# Patient Record
Sex: FEMALE | Race: WHITE | NOT HISPANIC OR LATINO | Employment: OTHER | URBAN - METROPOLITAN AREA
[De-identification: names, ages, dates, MRNs, and addresses within clinical notes are randomized per-mention and may not be internally consistent; named-entity substitution may affect disease eponyms.]

---

## 2017-04-14 ENCOUNTER — TRANSFERRED RECORDS (OUTPATIENT)
Dept: HEALTH INFORMATION MANAGEMENT | Facility: CLINIC | Age: 77
End: 2017-04-14

## 2017-04-27 ENCOUNTER — TRANSFERRED RECORDS (OUTPATIENT)
Dept: HEALTH INFORMATION MANAGEMENT | Facility: CLINIC | Age: 77
End: 2017-04-27

## 2017-05-05 ENCOUNTER — MYC MEDICAL ADVICE (OUTPATIENT)
Dept: FAMILY MEDICINE | Facility: CLINIC | Age: 77
End: 2017-05-05

## 2017-05-08 NOTE — TELEPHONE ENCOUNTER
Luciano Fonseca MD sent [employer] message, will close encounter for now  Katina Li RN, BSN  Message handled by Nurse Triage.

## 2017-05-10 ENCOUNTER — MYC MEDICAL ADVICE (OUTPATIENT)
Dept: FAMILY MEDICINE | Facility: CLINIC | Age: 77
End: 2017-05-10

## 2017-05-11 NOTE — TELEPHONE ENCOUNTER
Luciano Fonseca MD, see [employer] message, pt in [place], has neck issue-wonders if you want to refer elsewhere for issue or see you? Inform pt of plan via [facility]ive[facility] Li RN, BSN  Message handled by Nurse Triage.

## 2017-05-12 NOTE — TELEPHONE ENCOUNTER
i think we answered this in the last [employer] message. I'd like to see her and size it up to see what kind of specialty care might be the ticket.   BP Readings from Last 5 Encounters:   08/23/16 122/72   08/01/16 136/74   07/28/16 140/82   07/08/15 126/86   11/01/14 130/74   .

## 2017-05-22 ENCOUNTER — TRANSFERRED RECORDS (OUTPATIENT)
Dept: HEALTH INFORMATION MANAGEMENT | Facility: CLINIC | Age: 77
End: 2017-05-22

## 2017-05-23 ENCOUNTER — OFFICE VISIT (OUTPATIENT)
Dept: FAMILY MEDICINE | Facility: CLINIC | Age: 77
End: 2017-05-23
Payer: MEDICARE

## 2017-05-23 VITALS
HEART RATE: 90 BPM | BODY MASS INDEX: 32.5 KG/M2 | SYSTOLIC BLOOD PRESSURE: 150 MMHG | HEIGHT: 63 IN | OXYGEN SATURATION: 96 % | DIASTOLIC BLOOD PRESSURE: 80 MMHG | WEIGHT: 183.4 LBS | TEMPERATURE: 97.6 F

## 2017-05-23 DIAGNOSIS — F33.1 MAJOR DEPRESSIVE DISORDER, RECURRENT EPISODE, MODERATE (H): ICD-10-CM

## 2017-05-23 DIAGNOSIS — M50.30 DEGENERATION OF CERVICAL INTERVERTEBRAL DISC: Primary | ICD-10-CM

## 2017-05-23 PROCEDURE — 99214 OFFICE O/P EST MOD 30 MIN: CPT | Performed by: FAMILY MEDICINE

## 2017-05-23 NOTE — PROGRESS NOTES
SUBJECTIVE:                                                    Louann Crocker is a 76 year old female who presents to clinic today for the following health issues:  Past Medical History:   Diagnosis Date     Anxiety      DDD (degenerative disc disease), cervical      DDD (degenerative disc disease), lumbar      Fibromyalgia      GERD (gastroesophageal reflux disease)      IBS (irritable bowel syndrome)      Major depressive disorder, single episode, severe, without mention of psychotic behavior 2005    ECT x 16       Past Surgical History:   Procedure Laterality Date     ARTHROSCOPY KNEE RT/LT  2007     BACK SURGERY  5/2013    Sacroileac Fusion     BACK SURGERY  10/6/2014    Lumbar Fusion L3-L-4, L-4L-5     C FUSION MC-P JOINT,SINGLE  2010     C FUSION SHOULDER JOINT  1/01    lap bone sput     C ULLOA W/O FACETEC FORAMOT/DSKC 1/2 VRT SEG, LUMBAR  1993     C LAP,BILIARY TRACT,UNLISTED  1966     C LAPAROSCOPY, SURGICAL; W/ VAGINAL HYSTERECTOMY W/WO REMOVAL OVARY(S)/TUBES  1997    Laparoscopy, Vag Hysterectomy     C REMOVAL GALLBLADDER  1964    Cholecystectomy     CATARACT IOL, RT/LT  2008     COLONOSCOPY       ENDOSCOPIC RETROGRADE CHOLANGIOPANCREATOGRAPHY  2005     HC DILATION/CURETTAGE DIAG/THER NON OB  1961    D & C     HC ERCP W SPHINCTEROTOMY  12/09    CBD stones     HC REPAIR ROTATOR CUFF,ACUTE  3/90     SURGICAL HISTORY OF -   2005    cervical     SURGICAL HISTORY OF -   2005    lumbar      SURGICAL HISTORY OF -   2008    right knee replacement     VITRECTOMY PARSPLANA WITH 25 GAUGE SYSTEM  5/10/2012    Procedure:VITRECTOMY PARSPLANA WITH 25 GAUGE SYSTEM; LEFT EYE 25 GAUGE VITRECTOMY, MEMBRANE STRIPPING, AIR  FLUID EXCHANGE, GAS 15% C3F8; Surgeon:ANTONIETTA WALTERS; Location:[facility]       Family History   Problem Relation Age of Onset     Arthritis Mother      fibromyalgia     OSTEOPOROSIS Mother      CANCER Mother      colon     Hypertension Father      CEREBROVASCULAR DISEASE Father      Respiratory Brother       emphysema       Social History   Substance Use Topics     Smoking status: Former Smoker     Packs/day: 1.00     Years: 20.00     Types: Cigarettes     Quit date: 1/1/1977     Smokeless tobacco: Never Used     Alcohol use Yes      Comment: on occasion         Joint Pain     Onset: 2 months     Description:   Location: Neck, back left arm is getting weak   Character: Sharp,     Intensity: moderate    Progression of Symptoms: same    Accompanying Signs & Symptoms:  Other symptoms: radiation of pain to neck to back and down the arm    History:   Previous similar pain: yes       Precipitating factors:   Trauma or overuse: YES, overuse     Alleviating factors:  Improved by: nothing       Therapies Tried and outcome: none   Had MRI showing old fusion and ? New disk issues, She has the     Pain in both hands with known cervical spondylosis. Has had prior one level fusion now pain in right hand initially in the the thumb then as that improved pain in left forearm and dorsum of hand  Seen and MRI in [place]: patient has the study with her to go to Pain management to consider injection   She also had CST treatment in [place] for her depression and feels that she has been helped a lot by that.      REVIEW OF SYSTEMS    Generally has been otherwise  feeling well until this episode. No problems with vision, hearing, dental or neck pain.Has hph airborne or ingestion allergy  No chest pain, palpitations, dyspnea, change in bowel habits, blood  in stool or dyspepsia.  No rashes, changing moles, weakness, lassitude or low  back problems.  No chronic issues . No dysuria  Patient  Not  a smoker. No problems with significant headaches.  On exam the vital signs are stable  Weight is Body mass index is 32.49 kg/(m^2).   Eyes show mercy   No neck masses or thyromegaly.Ear nose and throat shows normal   No bruits, murmers, rubs or extrasounds. No cardiomegaly or chest wall tenderness. Lungs clear, no abdominal masses or organomegaly. No  CVA tenderness.  Skin eval no rash . No abnormal skin lesions. Normal genitalia. Good peripheral pulses. No adenopathy. and stance. Neck is supple.      (M50.30) Degeneration of cervical intervertebral disc  (primary encounter diagnosis)  Comment:   Plan: ORTHO  REFERRAL            (F33.1) Major depressive disorder, recurrent episode, moderate (H)  Comment: much better after her [place] stimulation treatment   Plan: phq9

## 2017-05-23 NOTE — MR AVS SNAPSHOT
After Visit Summary   5/23/2017    Louann Crocker    MRN: [id number]           Patient Information     Date Of Birth          1940        Visit Information        Provider Department      5/23/2017 10:30 AM Luciano Fonseca MD [facility]        Today's Diagnoses     Degeneration of cervical intervertebral disc    -  1       Follow-ups after your visit        Additional Services     ORTHO  REFERRAL       [facility] is referring you to the Orthopedic  Services at [facility].       The  Representative will assist you in the coordination of your Orthopedic and Musculoskeletal Care as prescribed by your physician.    The  Representative will call you within 1 business day to help schedule your appointment, or you may contact the  Representative at:    All areas ~ [phone]     Type of Referral : Spine: Cervical / Thoracic: Cervical / Thoracic Spine Surgeon        Timeframe requested: Within 2 weeks    Coverage of these services is subject to the terms and limitations of your health insurance plan.  Please call member services at your health plan with any benefit or coverage questions.      If X-rays, CT or MRI's have been performed, please contact the facility where they were done to arrange for , prior to your scheduled appointment.  Please bring this referral request to your appointment and present it to your specialist.                  Who to contact     If you have questions or need follow up information about today's clinic visit or your schedule please contact [facility] directly at [phone].  Normal or non-critical lab and imaging results will be communicated to you by MyChart, letter or phone within 4 business days after the clinic has received the results. If you do not hear from us within 7 days, please contact the clinic through [employer] or  "phone. If you have a critical or abnormal lab result, we will notify you by phone as soon as possible.  Submit refill requests through LocBox or call your pharmacy and they will forward the refill request to us. Please allow 3 business days for your refill to be completed.          Additional Information About Your Visit        Callisionhart Information     [employer]Box gives you secure access to your electronic health record. If you see a primary care provider, you can also send messages to your care team and make appointments. If you have questions, please call your primary care clinic.  If you do not have a primary care provider, please call [phone] and they will assist you.        Care EveryWhere ID     This is your Care EveryWhere ID. This could be used by other organizations to access your [facility] medical records  [id number]        Your Vitals Were     Pulse Temperature Height Pulse Oximetry BMI (Body Mass Index)       90 97.6  F (36.4  C) (Oral) 5' 3\" (1.6 m) 96% 32.49 kg/m2        Blood Pressure from Last 3 Encounters:   05/23/17 150/80   08/23/16 122/72   08/01/16 136/74    Weight from Last 3 Encounters:   05/23/17 183 lb 6.4 oz (83.2 kg)   08/23/16 179 lb (81.2 kg)   08/01/16 179 lb (81.2 kg)              We Performed the Following     DEPRESSION ACTION PLAN (DAP)     ORTHO [facility] REFERRAL        Primary Care Provider Office Phone # Fax #    Luciano Dylan Fonseca [phone]       [address] [address]        Thank you!     Thank you for choosing [facility]  for your care. Our goal is always to provide you with excellent care. Hearing back from our patients is one way we can continue to improve our services. Please take a few minutes to complete the written survey that you may receive in the mail after your visit with us. Thank you!             Your Updated Medication List - Protect others around you: Learn how to safely " use, store and throw away your medicines at www.disposemymeds.org.          This list is accurate as of: 5/23/17 11:01 AM.  Always use your most recent med list.                   Brand Name Dispense Instructions for use    calcium carbonate-vitamin D 600-400 MG-UNIT Chew      Take 1 chew tab by mouth       cholecalciferol 5000 UNITS Caps capsule    vitamin D3     Take 5,000 Units by mouth daily       clonazePAM 0.5 MG tablet    klonoPIN     Take 0.5 tablets (0.25 mg) by mouth 4 times daily       DULOXETINE HCL PO      Take 120 mg by mouth daily Pt taking 90mg daily at bedtime. Pt takes a 60mg tablet and a 30mg tablet to equal the 90mg dose.       estradiol 0.1 MG/GM cream    ESTRACE VAGINAL    42.5 g    Place 2 g vaginally twice a week       estradiol 1 MG tablet    ESTRACE    90 tablet    Take 1 tablet (1 mg) by mouth daily       flax seed oil 1000 MG capsule      Take 1 capsule by mouth daily       lamoTRIgine 100 MG tablet    LaMICtal         lidocaine 2 % topical gel    XYLOCAINE    30 mL    Apply topically as needed for moderate pain Apply to right 3rd toe up to 3 times a day as needed for pain.       Multi-vitamin Tabs tablet   Generic drug:  multivitamin, therapeutic with minerals     30    1 TABLET DAILY       NAPROXEN PO      Take 220-440 mg by mouth 2 times daily as needed for moderate pain       OMEPRAZOLE PO      Take 20 mg by mouth every morning       PAROXETINE HCL PO      Take 20 mg by mouth At Bedtime       tolnaftate 1 % cream    TINACTIN    30 g    Apply topically 2 times daily       VITAMIN B6 PO      Take 100 mg by mouth daily

## 2017-05-24 ASSESSMENT — PATIENT HEALTH QUESTIONNAIRE - PHQ9: SUM OF ALL RESPONSES TO PHQ QUESTIONS 1-9: 0

## 2017-05-31 ENCOUNTER — OFFICE VISIT (OUTPATIENT)
Dept: NEUROSURGERY | Facility: CLINIC | Age: 77
End: 2017-05-31
Attending: NURSE PRACTITIONER
Payer: MEDICARE

## 2017-05-31 ENCOUNTER — TELEPHONE (OUTPATIENT)
Dept: PALLIATIVE MEDICINE | Facility: CLINIC | Age: 77
End: 2017-05-31

## 2017-05-31 VITALS
HEART RATE: 81 BPM | SYSTOLIC BLOOD PRESSURE: 169 MMHG | WEIGHT: 183 LBS | BODY MASS INDEX: 32.43 KG/M2 | HEIGHT: 63 IN | OXYGEN SATURATION: 97 % | DIASTOLIC BLOOD PRESSURE: 82 MMHG | TEMPERATURE: 97.2 F

## 2017-05-31 DIAGNOSIS — M54.12 CERVICAL RADICULAR PAIN: Primary | ICD-10-CM

## 2017-05-31 PROCEDURE — 99211 OFF/OP EST MAY X REQ PHY/QHP: CPT | Performed by: NURSE PRACTITIONER

## 2017-05-31 PROCEDURE — 99203 OFFICE O/P NEW LOW 30 MIN: CPT | Performed by: NURSE PRACTITIONER

## 2017-05-31 ASSESSMENT — PAIN SCALES - GENERAL: PAINLEVEL: MILD PAIN (3)

## 2017-05-31 NOTE — PATIENT INSTRUCTIONS
1.  Please schedule your injection. Someone will contact you from the pain clinic within 24 hours to schedule.      2.  Please schedule your physical therapy.      3.  Please contact the clinic if pain persists at [phone].

## 2017-05-31 NOTE — MR AVS SNAPSHOT
After Visit Summary   5/31/2017    Louann Crocker    MRN: [id number]           Patient Information     Date Of Birth          1940        Visit Information        Provider Department      5/31/2017 10:00 AM Flor Ovalle APRN CNP [facility]        Today's Diagnoses     Cervical radicular pain    -  1      Care Instructions    1.  Please schedule your injection. Someone will contact you from the pain clinic within 24 hours to schedule.      2.  Please schedule your physical therapy.      3.  Please contact the clinic if pain persists at [phone].           Follow-ups after your visit        Additional Services     [facility], AND CHIROPRACT[facility] REFERRAL       **This order will print in the [facility] Scheduling Office**    Physical Therapy, Hand Therapy and Chiropractic Care are available through:    *[facility]  *[facility]  *[facility]    Call one number to schedule at any of the above locations: [phone].    Your provider has referred you to: Physical Therapy at [facility] or [facility]    Indication/Reason for Referral: neck and arm pain   Onset of Illness: chronic  Therapy Orders: Evaluate and Treat  Special Programs: None  Special Request: None    Shalini Loya      Additional Comments for the Therapist or Chiropractor:         Please be aware that coverage of these services is subject to the terms and limitations of your health insurance plan.  Call member services at your health plan with any benefit or coverage questions.      Please bring the following to your appointment:    *Your personal calendar for scheduling future appointments  *Comfortable clothing            PAIN MANAGEMENT CENTER ([place]) REFERRAL       Your provider has referred you to the [facility]. Dr. Baker    Reason for Referral: Procedure or injection only - patient will be contacted within 24 hrs to schedule: Epidural  Steroid (interlaminar approach): Cervical    Please complete the following questions:    What is your diagnosis for the patient's pain? Cervical radicular pain     Do you have any specific questions for the pain specialist? No    Are there any red flags that may impact the assessment or management of the patient? None    **ANY DIAGNOSTIC TESTS THAT ARE NOT IN EPIC SHOULD BE SENT TO THE PAIN CENTER**    Please note the Pre-Op Pain Consult must be scheduled 2-3 weeks prior to the patient's surgery.  Patient's trying to schedule within 2 weeks of surgery may not be accommodated.     Pre-Op Pain Consults are only good for 30 days.    REGARDING OPIOID MEDICATIONS:  We will always address appropriateness of opioid pain medications, but we generally will not automatically take on a prescribing role. When we do take on prescribing of opioids for chronic pain, it is in collaboration with the referring physician for an intermediate period of time (months), with an expectation that the primary physician or provider will assume the prescribing role if medications are effective at stable doses with demonstrated compliance.  Therefore, please do not assume that your prescribing responsibilities end on the day of pain clinic consultation.  Is this agreeable to you? YES    For any questions, contact the [facility] at [phone].    Please be aware that coverage of these services is subject to the terms and limitations of your health insurance plan.  Call member services at your health plan with any benefit or coverage questions.      Please bring the following with you to your appointment:    (1) Any X-Rays, CTs or MRIs which have been performed.  Contact the facility where they were done to arrange for  prior to your scheduled appointment.    (2) List of current medications   (3) This referral request   (4) Any documents/labs given to you for this referral                  Who to contact     If you  "have questions or need follow up information about today's clinic visit or your schedule please contact [facility] directly at [phone].  Normal or non-critical lab and imaging results will be communicated to you by MyChart, letter or phone within 4 business days after the clinic has received the results. If you do not hear from us within 7 days, please contact the clinic through MyChart or phone. If you have a critical or abnormal lab result, we will notify you by phone as soon as possible.  Submit refill requests through M[employer] ([email or web address]) or call your pharmacy and they will forward the refill request to us. Please allow 3 business days for your refill to be completed.          Additional Information About Your Visit        [employer] Information     [employer] ([email or web address]) gives you secure access to your electronic health record. If you see a primary care provider, you can also send messages to your care team and make appointments. If you have questions, please call your primary care clinic.  If you do not have a primary care provider, please call [phone] and they will assist you.        Care EveryWhere ID     This is your Care EveryWhere ID. This could be used by other organizations to access your [facility] medical records  [id number]        Your Vitals Were     Pulse Temperature Height Pulse Oximetry BMI (Body Mass Index)       81 97.2  F (36.2  C) 5' 3\" (1.6 m) 97% 32.42 kg/m2        Blood Pressure from Last 3 Encounters:   05/31/17 169/82   05/23/17 (P) 138/80   08/23/16 122/72    Weight from Last 3 Encounters:   05/31/17 183 lb (83 kg)   05/23/17 183 lb 6.4 oz (83.2 kg)   08/23/16 179 lb (81.2 kg)              We Performed the Following     [facility] REFERRAL     PAIN MANAGEMENT CENTER ([place]) REFERRAL        Primary Care Provider Office Phone # Fax #    Luciano Fonseca [phone]       [address] [address]      "   Thank you!     Thank you for choosing [facility]  for your care. Our goal is always to provide you with excellent care. Hearing back from our patients is one way we can continue to improve our services. Please take a few minutes to complete the written survey that you may receive in the mail after your visit with us. Thank you!             Your Updated Medication List - Protect others around you: Learn how to safely use, store and throw away your medicines at www.disposemymeds.org.          This list is accurate as of: 5/31/17 10:23 AM.  Always use your most recent med list.                   Brand Name Dispense Instructions for use    calcium carbonate-vitamin D 600-400 MG-UNIT Chew      Take 1 chew tab by mouth       cholecalciferol 5000 UNITS Caps capsule    vitamin D3     Take 5,000 Units by mouth daily       clonazePAM 0.5 MG tablet    klonoPIN     Take 0.5 tablets (0.25 mg) by mouth 4 times daily       DULOXETINE HCL PO      Take 120 mg by mouth daily Pt taking 90mg daily at bedtime. Pt takes a 60mg tablet and a 30mg tablet to equal the 90mg dose.       estradiol 0.1 MG/GM cream    ESTRACE VAGINAL    42.5 g    Place 2 g vaginally twice a week       estradiol 1 MG tablet    ESTRACE    90 tablet    Take 1 tablet (1 mg) by mouth daily       flax seed oil 1000 MG capsule      Take 1 capsule by mouth daily       lamoTRIgine 100 MG tablet    LaMICtal         lidocaine 2 % topical gel    XYLOCAINE    30 mL    Apply topically as needed for moderate pain Apply to right 3rd toe up to 3 times a day as needed for pain.       Multi-vitamin Tabs tablet   Generic drug:  multivitamin, therapeutic with minerals     30    1 TABLET DAILY       NAPROXEN PO      Take 220-440 mg by mouth 2 times daily as needed for moderate pain       OMEPRAZOLE PO      Take 20 mg by mouth every morning       PAROXETINE HCL PO      Take 20 mg by mouth At Bedtime       tolnaftate 1 % cream    TINACTIN    30 g    Apply  topically 2 times daily       VITAMIN B6 PO      Take 100 mg by mouth daily

## 2017-05-31 NOTE — NURSING NOTE
"Louann Crocker is a 76 year old female who presents for:  Chief Complaint   Patient presents with     Neurologic Problem     degeneration of cervical intervertebral disc referred by Dr. Luciano Fonseca        Initial Vitals:  /82 (BP Location: Right arm, Patient Position: Chair, Cuff Size: Adult Large)  Pulse 81  Temp 97.2  F (36.2  C)  Ht 5' 3\" (1.6 m)  Wt 183 lb (83 kg)  SpO2 97%  BMI 32.42 kg/m2 Estimated body mass index is 32.42 kg/(m^2) as calculated from the following:    Height as of this encounter: 5' 3\" (1.6 m).    Weight as of this encounter: 183 lb (83 kg).. Body surface area is 1.92 meters squared. BP completed using cuff size: large  Mild Pain (3)    Do you feel safe in your environment?  Yes  Do you need any refills today? No    Nursing Comments: degeneration of cervical intervertebral disc referred by Dr. Luciano Fonseca.  Patient rates neck today as 3.      5 min. nursing intake time  Marysol Chawla CMA      Discharge plan:     1.  Please schedule your injection. Someone will contact you from the pain clinic within 24 hours to schedule.      2.  Please schedule your physical therapy.      3.  Please contact the clinic if pain persists at [phone].     2 min. nursing discharge time  Marysol Chawla CMA    "

## 2017-05-31 NOTE — TELEPHONE ENCOUNTER
Pre-screening Questions for Radiology Injections:    Injection to be done at which interventional clinic site? [facility]    Procedure ordered by Flor Ovalle    Procedure ordered? Cervical Epidural Steroid Injection    What insurance would patient like us to bill for this procedure? Medicare and [employer]      Worker's comp-Any injection DO NOT SCHEDULE and route to Komal Bess.      [employer] insurance - For SI joint injections, DO NOT SCHEDULE and route Komal Bess.      [employer]- MBB's must be scheduled at LEAST two weeks apart      [employer] - Any injection besides hip/shoulder/knee joint DO NOT SCHEDULE and route to Komal Bess. She will obtain PA and call pt back to schedule procedure or notify pt of denial.     HP [employer]-PA REQUIRED FOR NON-ANTHONY OR Joint injections    Any chance of pregnancy? NO   If YES, do NOT schedule and route to RN pool    Is an  needed? No     Patient has a drive home? (mandatory) YES: INFORMED    Is patient taking any blood thinners (plavix, coumadin, jantoven, warfarin, heparin, pradaxa or dabigatran )? No   If hold needed, do NOT schedule, route to RN pool     Is patient taking any aspirin products? No     If more than 325mg/day do NOT schedule; route to RN pool     For CERVICAL procedures, hold all aspirin products for 6 days.      Does the patient have a bleeding or clotting disorder? No     If yes, okay to schedule AND route to RN nurse pool    **For any patients with platelet count <100, must be forwarded to provider**    Is patient diabetic?  No  If YES, have them bring their glucometer.    Does patient have an active infection or treated for one within the past week? No     Is patient currently taking any antibiotics?  Yes - Amoxicillin - preventative - done on 6/1     For patients on chronic, preventative, or prophylactic antibiotics, procedures may be scheduled.     For patients on antibiotics for active or recent infection:    Bambi Mckeon  Eleno Esparza [place], [place]-antibiotic course must have been completed for 4 days    Bambi Dye-antibiotic course must have been completed for 7 days    Is patient currently taking any steroid medications? (i.e. Prednisone, Medrol)  No     For patients on steroid medications:    Eleno Lozoya [place], [place]-steroid course must have been completed for 4 days    Bambi Dye-steroid course must have been completed for 7 days    Reviewed with patient:  If you are started on any steroids or antibiotics between now and your appointment, you must contact us because it may affect our ability to perform your procedure.  Yes    Is patient actively being treated for cancer or immunocompromised, including the spleen having been removed? No    If YES, do NOT schedule and route to RN pool     **For Dr. Mccollum patients without spleens should have the chart sent to her**    Are you able to get on and off an exam table with minimal or no assistance? Yes  If NO, do NOT schedule and route to RN pool    Are you able to roll over and lay on your stomach with minimal or no assistance? Yes  If NO, do NOT schedule and route to RN pool     Any allergies to contrast dye, iodine, shellfish, or numbing and steroid medications? No  If YES, route to RN pool AND add allergy information to appointment notes    Allergies: Adhesive tape; Flagyl [metronidazole hcl]; and Nickel        Has the patient had a flu shot or any other vaccinations within 7 days before or after the procedure.  No       Does patient have an MRI/CT?  YES: MRI 4/2017  (SI joint, hip injections, lumbar sympathetic blocks, and stellate ganglion blocks do not require an MRI)    Was the MRI done w/in the last 3 years?  Yes    Was MRI done at [place]? No      If not, where was it done? [place] - will bring disc with        If MRI was not done at [place], [facility] or [facility] do NOT schedule and route to nursing.  If pt has an imaging disc, the  injection may be scheduled but pt has to bring disc to appt. If they show up w/out disc the injection cannot be done    Reminders (please tell patient if applicable):       Instructed pt to arrive 30 minutes early for IV start if this is for a cervical procedure, ALL sympathetic (stellate ganglion, hypogastric, or lumbar sympathetic block) and all sedation procedures (RFA, spinal cord stimulation trials).  Not Applicable - [place] - NO IV    -IVs are not routinely placed for Johansen and Egyhazi cervical case       If NPO for sedation, informed patient that it is okay to take medications with sips of water (except if they are to hold blood thinners).  Not Applicable   *DO take blood pressure medication if it is prescribed*      If this is for a cervical ANTHONY, informed patient that aspirin needs to be held for 6 days.   YES: REVIEWED      For all patients not having spinal cord stimulator (SCS) trials or radiofrequency ablations (RFAs), informed patient:  IV sedation is not provided for this procedure.  If you feel that an oral anti-anxiety medication is needed, you can discuss this further with your referring provider or primary care provider.  The Pain Clinic provider will discuss specifics of what the procedure includes at your appointment.  Most procedures last 10-20 minutes.  We use numbing medications to help with any discomfort during the procedure.  Not Applicable      Do not schedule procedures requiring IV placement in the first appointment of the day or first appointment after lunch. REVIEWED      For patients 85 or older we recommend having an adult stay w/ them for the remainder of the day.   REVIEWED    Does the patient have any questions?  NO  Louisa Chadwick  [facility]

## 2017-05-31 NOTE — PROGRESS NOTES
Dr. Oneil Serrano  [facility]  Neurosurgery Clinic Visit          CC: neck pain    Primary care Provider: Luciano Fonseca      Reason For Visit:   I was asked by Dr. Fonseca to consult on the patient for cervical radicular pain.      HPI: Louann Crocker is a 76 year old female with cervical radicular pain.  She reports that she has had pain for many years.  She has had a previous cervical fusion in [place]. She notes left arm numbness and weakness.  She reports that the surgery was in 2005 but can not say for certain if she felt better or not after.  Per her MRI she had a previous C5-6 ACDF.  She reports that her pain is constant but variable. She has not had any PT or injections for her pain.      Pain at its worst 10  Pain right now:  3    Past Medical History:   Diagnosis Date     Anxiety      DDD (degenerative disc disease), cervical      DDD (degenerative disc disease), lumbar      Fibromyalgia      GERD (gastroesophageal reflux disease)      IBS (irritable bowel syndrome)      Major depressive disorder, single episode, severe, without mention of psychotic behavior 2005    ECT x 16       Past Medical History reviewed with patient during visit.    Past Surgical History:   Procedure Laterality Date     ARTHROSCOPY KNEE RT/LT  2007     BACK SURGERY  5/2013    Sacroileac Fusion     BACK SURGERY  10/6/2014    Lumbar Fusion L3-L-4, L-4L-5     C FUSION MC-P JOINT,SINGLE  2010     C FUSION SHOULDER JOINT  1/01    lap bone sput     C ULLOA W/O FACETEC FORAMOT/DSKC 1/2 VRT SEG, LUMBAR  1993     C LAP,BILIARY TRACT,UNLISTED  1966     C LAPAROSCOPY, SURGICAL; W/ VAGINAL HYSTERECTOMY W/WO REMOVAL OVARY(S)/TUBES  1997    Laparoscopy, Vag Hysterectomy     C REMOVAL GALLBLADDER  1964    Cholecystectomy     CATARACT IOL, RT/LT  2008     COLONOSCOPY       ENDOSCOPIC RETROGRADE CHOLANGIOPANCREATOGRAPHY  2005     HC DILATION/CURETTAGE DIAG/THER NON OB  1961    D & C     HC ERCP W SPHINCTEROTOMY   12/09    CBD stones     HC REPAIR ROTATOR CUFF,ACUTE  3/90     SURGICAL HISTORY OF -   2005    cervical     SURGICAL HISTORY OF -   2005    lumbar      SURGICAL HISTORY OF -   2008    right knee replacement     VITRECTOMY PARSPLANA WITH 25 GAUGE SYSTEM  5/10/2012    Procedure:VITRECTOMY PARSPLANA WITH 25 GAUGE SYSTEM; LEFT EYE 25 GAUGE VITRECTOMY, MEMBRANE STRIPPING, AIR  FLUID EXCHANGE, GAS 15% C3F8; Surgeon:ANTONIETTA WALTERS; Location:[facility]     Past Surgical History reviewed with patient during visit.    Current Outpatient Prescriptions   Medication     estradiol (ESTRACE) 1 MG tablet     tolnaftate (TINACTIN) 1 % cream     lidocaine (XYLOCAINE) 2 % jelly     calcium carbonate-vitamin D 600-400 MG-UNIT CHEW     Pyridoxine HCl (VITAMIN B6 PO)     estradiol (ESTRACE VAGINAL) 0.1 MG/GM vaginal cream     lamoTRIgine (LAMICTAL) 100 MG tablet     cholecalciferol (VITAMIN D3) 5000 UNITS CAPS capsule     Flaxseed, Linseed, (FLAX SEED OIL) 1000 MG capsule     DULOXETINE HCL PO     NAPROXEN PO     PAROXETINE HCL PO     OMEPRAZOLE PO     clonazePAM (KLONOPIN) 0.5 MG tablet     MULTI-VITAMIN OR TABS     No current facility-administered medications for this visit.        Allergies   Allergen Reactions     Adhesive Tape      Flagyl [Metronidazole Hcl]      Imidazole antifungals, HIVES & RASH     Nickel      rash       Social History     Social History     Marital status:      Spouse name: Amador     Number of children: 4     Years of education: N/A     Occupational History      Retired     self employed     Social History Main Topics     Smoking status: Former Smoker     Packs/day: 1.00     Years: 20.00     Types: Cigarettes     Quit date: 1/1/1977     Smokeless tobacco: Never Used     Alcohol use Yes      Comment: on occasion     Drug use: No     Sexual activity: Yes     Partners: Male     Birth control/ protection: Surgical     Other Topics Concern     Not on file     Social History Narrative       Family History  "  Problem Relation Age of Onset     Arthritis Mother      fibromyalgia     OSTEOPOROSIS Mother      CANCER Mother      colon     Hypertension Father      CEREBROVASCULAR DISEASE Father      Respiratory Brother      emphysema         Review Of Systems  Skin: negative  Eyes: negative  Ears/Nose/Throat: hearing loss  Respiratory: No shortness of breath, dyspnea on exertion, cough, or hemoptysis  Cardiovascular: HLD, HTN  Gastrointestinal: heartburn and gallbladder trouble, IBS  Genitourinary: negative  Musculoskeletal: neck pain  Neurologic: numbness or tingling of hands  Psychiatric: negative  Hematologic/Lymphatic/Immunologic: negative  Endocrine: diabetes     ROS: 10 point ROS neg other than the symptoms noted above in the HPI.    Vital Signs: /82 (BP Location: Right arm, Patient Position: Chair, Cuff Size: Adult Large)  Pulse 81  Temp 97.2  F (36.2  C)  Ht 5' 3\" (1.6 m)  Wt 183 lb (83 kg)  SpO2 97%  BMI 32.42 kg/m2    Examination:  Constitutional:  Alert, well nourished, NAD.  HEENT: Normocephalic, atraumatic.   Pulm:  Without shortness of breath   CV:  No pitting edema of BLE.    Neurological:  Awake  Alert  Oriented x 3  Speech clear  Cranial nerves II - XII intact  PERRL  EOMI  Face symmetric  Tongue midline  Motor exam   Shoulder Abduction:  Right:  5/5   Left:  5/5  Biceps:                      Right:  5/5   Left:  5/5  Triceps:                     Right:  5/5   Left:  5/5  Wrist Extensors:       Right:  5/5   Left:  5/5  Wrist Flexors:           Right:  5/5   Left:  5/5  Intrinsics:                   Right:  5/5   Left:  5/5   Hip Flexor:                Right: 5/5  Left:  5/5  Hip Adductor:             Right:  5/5  Left:  5/5  Hip Abductor:             Right:  5/5  Left:  5/5  Gastroc Soleus:        Right:  5/5  Left:  5/5  Tib/Ant:                      Right:  5/5  Left:  5/5  EHL:                          Right:  5/5  Left:  5/5   Sensation normal to bilateral upper and lower " extremities  Negative clonus or hyperreflexia.   Gait: Able to stand from a seated position. Normal non-antalgic, non-myelopathic gait.    Cervical examination reveals painful extension.  Tenderness to palpation of the cervical spine  paraspinous muscles bilaterally.      [facility]     [facility]    1.  C5-6 ACDF with bridging bone.  Adjacent segment degenerative changes on both sides.  Mild to moderate canal stenosis at C4-5. Mild to moderate canal stenosis at C6-7.  2.  C3-4 disc bulge and central protrusion with mild to moderate canal stenosis.        Assessment/Plan:    Louann Crocker is a 76 year old female with cervical radicular pain.  She reports that she has had pain for many years.  She has had a previous cervical fusion in [place]. She notes left arm numbness and weakness.  She reports that the surgery was in 2005 but can not say for certain if she felt better or not after.  Per her MRI she had a previous C5-6 ACDF.  She reports that her pain is constant but variable. She has not had any PT or injections for her pain.  The pts cervical MRI was reviewed in detail. The pt does not have any significant stenosis or impingement. It is recommended that she try PT and injections.  She is not interested in surgery and would like to pursue conservative care.      Patient Instructions   1.  Please schedule your injection. Someone will contact you from the pain clinic within 24 hours to schedule.      2.  Please schedule your physical therapy.      3.  Please contact the clinic if pain persists at [phone].      Flor Ovalle [facility]  [facility]  [address] [address]    Tel [phone]  Pager [phone]

## 2017-06-02 ENCOUNTER — THERAPY VISIT (OUTPATIENT)
Dept: PHYSICAL THERAPY | Facility: CLINIC | Age: 77
End: 2017-06-02
Payer: MEDICARE

## 2017-06-02 DIAGNOSIS — M54.2 NECK PAIN: Primary | ICD-10-CM

## 2017-06-02 PROCEDURE — 97110 THERAPEUTIC EXERCISES: CPT | Mod: GP | Performed by: PHYSICAL THERAPIST

## 2017-06-02 PROCEDURE — G8981 BODY POS CURRENT STATUS: HCPCS | Mod: GP | Performed by: PHYSICAL THERAPIST

## 2017-06-02 PROCEDURE — G8982 BODY POS GOAL STATUS: HCPCS | Mod: GP | Performed by: PHYSICAL THERAPIST

## 2017-06-02 PROCEDURE — 97140 MANUAL THERAPY 1/> REGIONS: CPT | Mod: GP | Performed by: PHYSICAL THERAPIST

## 2017-06-02 PROCEDURE — 97161 PT EVAL LOW COMPLEX 20 MIN: CPT | Mod: GP | Performed by: PHYSICAL THERAPIST

## 2017-06-02 NOTE — LETTER
DEPARTMENT OF HEALTH AND HUMAN SERVICES  CENTERS FOR MEDICARE & MEDICAID SERVICES    PLAN/UPDATED PLAN OF PROGRESS FOR OUTPATIENT REHABILITATION    PATIENTS NAME:  Louann Crocker   : 1940  PROVIDER NUMBER:    [id number]  [facility]  [facility] PHYSICAL THERAPY  MEDICAL RECORD NUMBER: [id number]   START OF CARE DATE:  SOC Date: 17   TYPE:  PT  PRIMARY/TREATMENT DIAGNOSIS: (Pertinent Medical Diagnosis)  Neck pain  VISITS FROM START OF CARE:  Rxs Used: 1     Subjective:  Patient is a 76 year old female presenting with rehab cervical spine hpi. The history is provided by the patient. No  was used.   Louann Crocker is a 76 year old female with a cervical spine condition.  Condition occurred with:  Degenerative joint disease and insidious onset.  Condition occurred: for unknown reasons.  This is a chronic condition  Pt reports having bilateral neck pain with left UE pain into forearm that has been present for greater than 3 years.  History of C5-C6 fusion greater than 5 years ago. Recent MRI showed degeneration and herniations above and below fused level.  MD appt on 17.   Patient reports pain:  Cervical left side, cervical right side and central cervical spine.  Radiates to:  Head.  Pain is described as aching and is intermittent and reported as 5/10.  Associated symptoms:  Loss of motion/stiffness. Pain is worse during the day.  Symptoms are exacerbated by rotating head, looking up or down and change of position and relieved by rest.  Since onset symptoms are unchanged.  Special tests:  MRI.  Previous treatment includes surgery.  There was mild improvement following previous treatment.  General health as reported by patient is fair.  Pertinent medical history includes:  Overweight, depression and fibromyalgia.  Medical allergies: yes (afhesives).  Other surgeries include:  Orthopedic surgery (neck and right  knee).  Current medications:  Anti-depressants and pain medication.  Current occupation is Retired.  Barriers include:  None as reported by the patient.  Red flags:  None as reported by the patient.    Objective:  Standing Alignment:    Cervical/Thoracic:  Forward head  Shoulder/UE:  Rounded shoulders  Flexibility/Screens:   Positive screens:  Cervical  Spine:  Decreased left spine flexibility:  Upper Trap and Levator  Decreased right spine flexibility:  Upper Trap and Levator                  Louann Crocker         Cervical/Thoracic Evaluation  AROM Cervical:  Flexion:            75% with ERP  Extension:       25% with ERP  Rotation:         Left: 50% with ERP     Right: 50% with ERP  Side Bend:      Left: 50% with ERP     Right:  50% with ERP  Headaches: cervical  Cervical Myotomes:  normal  Cervical Dermatomes:  normal  Cervical Palpation:    Tenderness present at Left:    Upper Trap; Levator; Erector Spinae and Suboccipitals  Tenderness present at Right:    Upper Trap; Levator; Erector Spinae and Suboccipitals         Assessment/Plan:    Patient is a 76 year old female with cervical complaints.    Patient has the following significant findings with corresponding treatment plan.                Diagnosis 1:  Neck pain  Pain -  self management, education, directional preference exercise and home program  Decreased ROM/flexibility - manual therapy and therapeutic exercise  Decreased joint mobility - manual therapy and therapeutic exercise  Decreased strength - therapeutic exercise and therapeutic activities  Impaired muscle performance - neuro re-education  Decreased function - therapeutic activities    Therapy Evaluation Codes:   1) History comprised of:   Personal factors that impact the plan of care:      None.    Comorbidity factors that impact the plan of care are:      Depression, Fibromyalgia, Mental illness and Overweight.     Medications impacting care: Anti-depressant and Pain.  2) Examination of Body  "Systems comprised of:   Body structures and functions that impact the plan of care:      Cervical spine.   Activity limitations that impact the plan of care are:      Lifting and Sleeping.  3) Clinical presentation characteristics are:   Stable/Uncomplicated.  4) Decision-Making    Low complexity using standardized patient assessment instrument and/or measureable assessment of functional outcome.  Cumulative Therapy Evaluation is: Low complexity.          Louann Crocker         Previous and current functional limitations:  (See Goal Flow Sheet for this information)    Short term and Long term goals: (See Goal Flow Sheet for this information)   Communication ability:  Patient appears to be able to clearly communicate and understand verbal and written communication and follow directions correctly.  Treatment Explanation - The following has been discussed with the patient:   RX ordered/plan of care  Anticipated outcomes  Possible risks and side effects  This patient would benefit from PT intervention to resume normal activities.   Rehab potential is fair.  Frequency:  1 X week, once daily  Duration:  for 6 weeks  Discharge Plan:  Achieve all LTG.  Independent in home treatment program.  Reach maximal therapeutic benefit.    Please refer to the daily flowsheet for treatment today, total treatment time and time spent performing 1:1 timed codes.      Caregiver Signature/Credentials _____________________________ Date ________       Treating Provider: Anthony Coreas PT   I have reviewed and certified the need for these services and plan of treatment while under my care.      PHYSICIAN'S SIGNATURE:   _________________________________________  Date___________   Flor Ovalle    Certification period:  Beginning of Cert date period: 06/02/17 to  End of Cert period date: 08/30/17     Functional Level Progress Report: Please see attached \"Goal Flow sheet for Functional level.\"    ____X____ Continue Services or       " ________ DC Services                Service dates: From  SOC Date: 06/02/17 date to present

## 2017-06-02 NOTE — PROGRESS NOTES
Subjective:    Patient is a 76 year old female presenting with rehab cervical spine hpi. The history is provided by the patient. No  was used.   Louann Crocker is a 76 year old female with a cervical spine condition.  Condition occurred with:  Degenerative joint disease and insidious onset.  Condition occurred: for unknown reasons.  This is a chronic condition  Pt reports having bilateral neck pain with left UE pain into forearm that has been present for greater than 3 years.  History of C5-C6 fusion greater than 5 years ago. Recent MRI showed degeneration and herniations above and below fused level.  MD appt on 5/31/17. .    Patient reports pain:  Cervical left side, cervical right side and central cervical spine.  Radiates to:  Head.  Pain is described as aching and is intermittent and reported as 5/10.  Associated symptoms:  Loss of motion/stiffness. Pain is worse during the day.  Symptoms are exacerbated by rotating head, looking up or down and change of position and relieved by rest.  Since onset symptoms are unchanged.  Special tests:  MRI.  Previous treatment includes surgery.  There was mild improvement following previous treatment.  General health as reported by patient is fair.  Pertinent medical history includes:  Overweight, depression and fibromyalgia.  Medical allergies: yes (afhesives).  Other surgeries include:  Orthopedic surgery (neck and right knee).  Current medications:  Anti-depressants and pain medication.  Current occupation is Retired  .        Barriers include:  None as reported by the patient.    Red flags:  None as reported by the patient.                        Objective:    Standing Alignment:    Cervical/Thoracic:  Forward head  Shoulder/UE:  Rounded shoulders                  Flexibility/Screens:   Positive screens:  Cervical      Spine:  Decreased left spine flexibility:  Upper Trap and Levator    Decreased right spine flexibility:  Upper Trap and  Levator                  Cervical/Thoracic Evaluation    AROM:  AROM Cervical:    Flexion:            75% with ERP  Extension:       25% with ERP  Rotation:         Left: 50% with ERP     Right: 50% with ERP  Side Bend:      Left: 50% with ERP     Right:  50% with ERP      Headaches: cervical  Cervical Myotomes:  normal                      Cervical Dermatomes:  normal                    Cervical Palpation:    Tenderness present at Left:    Upper Trap; Levator; Erector Spinae and Suboccipitals  Tenderness present at Right:    Upper Trap; Levator; Erector Spinae and Suboccipitals                                                  General     ROS    Assessment/Plan:      Patient is a 76 year old female with cervical complaints.    Patient has the following significant findings with corresponding treatment plan.                Diagnosis 1:  Neck pain  Pain -  self management, education, directional preference exercise and home program  Decreased ROM/flexibility - manual therapy and therapeutic exercise  Decreased joint mobility - manual therapy and therapeutic exercise  Decreased strength - therapeutic exercise and therapeutic activities  Impaired muscle performance - neuro re-education  Decreased function - therapeutic activities    Therapy Evaluation Codes:   1) History comprised of:   Personal factors that impact the plan of care:      None.    Comorbidity factors that impact the plan of care are:      Depression, Fibromyalgia, Mental illness and Overweight.     Medications impacting care: Anti-depressant and Pain.  2) Examination of Body Systems comprised of:   Body structures and functions that impact the plan of care:      Cervical spine.   Activity limitations that impact the plan of care are:      Lifting and Sleeping.  3) Clinical presentation characteristics are:   Stable/Uncomplicated.  4) Decision-Making    Low complexity using standardized patient assessment instrument and/or measureable assessment of  functional outcome.  Cumulative Therapy Evaluation is: Low complexity.    Previous and current functional limitations:  (See Goal Flow Sheet for this information)    Short term and Long term goals: (See Goal Flow Sheet for this information)     Communication ability:  Patient appears to be able to clearly communicate and understand verbal and written communication and follow directions correctly.  Treatment Explanation - The following has been discussed with the patient:   RX ordered/plan of care  Anticipated outcomes  Possible risks and side effects  This patient would benefit from PT intervention to resume normal activities.   Rehab potential is fair.    Frequency:  1 X week, once daily  Duration:  for 6 weeks  Discharge Plan:  Achieve all LTG.  Independent in home treatment program.  Reach maximal therapeutic benefit.    Please refer to the daily flowsheet for treatment today, total treatment time and time spent performing 1:1 timed codes.

## 2017-06-02 NOTE — PROGRESS NOTES
Subjective:    Patient is a 76 year old female presenting with rehab left ankle/foot hpi.                                      Pertinent medical history includes:  Overweight, mental illness, depression, implanted device, fibromyalgia and other.  Medical allergies: yes.  Other surgeries include:  Orthopedic surgery.  Current medications:  Hormone replacement therapy, pain medication and anti-depressants.  Current occupation is retired.                                    Objective:    System    Physical Exam    General     ROS    Assessment/Plan:

## 2017-06-02 NOTE — MR AVS SNAPSHOT
After Visit Summary   6/2/2017    Louann Crocker    MRN: [id number]           Patient Information     Date Of Birth          1940        Visit Information        Provider Department      6/2/2017 9:30 AM Luciano Coreas, PT [facility]        Today's Diagnoses     Neck pain    -  1       Follow-ups after your visit        Your next 10 appointments already scheduled     Jun 07, 2017  2:15 PM CDT   Radiology Injections with Cassia Baker MD   [facility] ([facility])    [address]  [address]  [place] [address]   [phone]            Jun 09, 2017  9:30 AM CDT   ANNABELLA Spine with Luciano Coreas PT   [facility] ([facility]    [address]  [place] [address] [phone]            Jun 16, 2017  9:30 AM CDT   ANNABELLA Spine with Abi Owens PT   [facility] ([facility])    [address]  [place] [address] [phone]              Who to contact     If you have questions or need follow up information about today's clinic visit or your schedule please contact [facility] directly at [phone].  Normal or non-critical lab and imaging results will be communicated to you by MyChart, letter or phone within 4 business days after the clinic has received the results. If you do not hear from us within 7 days, please contact the clinic through MyChart or phone. If you have a critical or abnormal lab result, we will notify you by phone as soon as possible.  Submit refill requests through [employer] or call your pharmacy and they will forward the refill request to us. Please allow 3 business days for your refill to be completed.          Additional Information About Your Visit        [employer]  Information     [facility] gives you secure access to your electronic health record. If you see a primary care provider, you can also send messages to your care team and make appointments. If you have questions, please call your primary care clinic.  If you do not have a primary care provider, please call [phone] and they will assist you.        Care EveryWhere ID     This is your Care EveryWhere ID. This could be used by other organizations to access your [place] medical records  [id number]         Blood Pressure from Last 3 Encounters:   05/31/17 169/82   05/23/17 (P) 138/80   08/23/16 122/72    Weight from Last 3 Encounters:   05/31/17 83 kg (183 lb)   05/23/17 83.2 kg (183 lb 6.4 oz)   08/23/16 81.2 kg (179 lb)              We Performed the Following     HC PT EVAL, LOW COMPLEXITY     ANNABELLA CERT REPORT     ANNABELLA INITIAL EVAL REPORT     MANUAL THER TECH,1+REGIONS,EA 15 MIN     THERAPEUTIC EXERCISES        Primary Care Provider Office Phone # Fax #    Luciano Dylan Fonseca [phone]       [address] [address]        Thank you!     Thank you for choosing [facility]  for your care. Our goal is always to provide you with excellent care. Hearing back from our patients is one way we can continue to improve our services. Please take a few minutes to complete the written survey that you may receive in the mail after your visit with us. Thank you!             Your Updated Medication List - Protect others around you: Learn how to safely use, store and throw away your medicines at www.disposemymeds.org.          This list is accurate as of: 6/2/17  1:45 PM.  Always use your most recent med list.                   Brand Name Dispense Instructions for use    calcium carbonate-vitamin D 600-400 MG-UNIT Chew      Take 1 chew tab by mouth       cholecalciferol 5000 UNITS Caps capsule    vitamin D3     Take  5,000 Units by mouth daily       clonazePAM 0.5 MG tablet    klonoPIN     Take 0.5 tablets (0.25 mg) by mouth 4 times daily       DULOXETINE HCL PO      Take 120 mg by mouth daily Pt taking 90mg daily at bedtime. Pt takes a 60mg tablet and a 30mg tablet to equal the 90mg dose.       estradiol 0.1 MG/GM cream    ESTRACE VAGINAL    42.5 g    Place 2 g vaginally twice a week       estradiol 1 MG tablet    ESTRACE    90 tablet    Take 1 tablet (1 mg) by mouth daily       flax seed oil 1000 MG capsule      Take 1 capsule by mouth daily       lamoTRIgine 100 MG tablet    LaMICtal         lidocaine 2 % topical gel    XYLOCAINE    30 mL    Apply topically as needed for moderate pain Apply to right 3rd toe up to 3 times a day as needed for pain.       Multi-vitamin Tabs tablet   Generic drug:  multivitamin, therapeutic with minerals     30    1 TABLET DAILY       NAPROXEN PO      Take 220-440 mg by mouth 2 times daily as needed for moderate pain       OMEPRAZOLE PO      Take 20 mg by mouth every morning       PAROXETINE HCL PO      Take 20 mg by mouth At Bedtime       tolnaftate 1 % cream    TINACTIN    30 g    Apply topically 2 times daily       VITAMIN B6 PO      Take 100 mg by mouth daily

## 2017-06-06 NOTE — PROGRESS NOTES
[facility] - Procedure Note    Date of Visit: 6/07/2017    Procedure performed: C7-T1 interlaminar epidural steroid injection with fluoroscopic guidance  Diagnosis: Cervical spondylosis; Cervical radiculitis/radiculopathy  : Cassia Baker MD & Cosmo Doe MD (pain fellow)  Anesthesia: none    Indications: Louann Crocker is a 76 year old female who is seen at the request of Flor Ovalle CNP for cervical epidural steroid injection. The patient describes neck pain and left arm numbness and weakness.  She is s/p C5-6 ACDF in 2005. The patient has been exhibiting symptoms consistent with cervical intraspinal inflammation and radiculopathy. Symptoms have been persistent, disabling, and intermittently severe. The patient reports minimal improvement with conservative treatment, including PT and medications.    Cervical CT scan was done on 10/22/2014 which showed:  FINDINGS: There are 7  cervical type vertebrae used for the purpose of  this dictation. The alignment of the cervical spine is normal.   The  patient is status post anterior fusion at C5-6. The fusion appears  solid.     C1-C2:  There is a small amount of calcified pannus posterior to the  odontoid.      C2-C3:  The disc is normal. Degenerative change in the right facet  joint.      C3-C4:  Mild annular disc bulge. Central canal normal.      C4-C5:  Mild annular disc bulge. Central canal normal.       C5-C6:  Solid anterior fusion.      C6-C7:  Mild annular disc bulge.     C7-T1:  Normal disc, facet joints, spinal canal and neural foramina.     IMPRESSION:   1. No fractures are identified.  2. Degenerative change.  3. Solid anterior fusion at C5-6.    Allergies:      Allergies   Allergen Reactions     Adhesive Tape      Flagyl [Metronidazole Hcl]      Imidazole antifungals, HIVES & RASH     Nickel      rash        Vitals:  /81  Pulse 79  SpO2 98%    Review of Systems: The patient denies recent fever, chills,  illness, use of antibiotics or anticoagulants. All other 10-point review of systems negative.     Procedure: The procedure and risks were explained, and informed written consent was obtained from the patient. Risks include but are not limited to: infection, bleeding, increased pain, and damage to soft tissue, nerve, muscle, and vasculature structures. After getting informed consent, patient was brought into the procedure suite and was placed in a prone position on the procedure table. A Pause for the Cause was performed. Patient was prepped and draped in sterile fashion.     The C7-T1 interspace was identified with use of fluoroscopy in AP view. A 25-gauge, 1.5 inch needle was used to anesthetize the skin and subcutaneous tissue entry site with a total of 2 ml of 1% lidocaine. Under fluoroscopic visualization, a 22-gauge, 3.5 inch Tuohy epidural needle was slowly advanced towards the epidural space a few millimeters midline. The latter part of the needle advancement was guided with fluoroscopy in the lateral view. The epidural space was identified using loss of resistance technique. After negative aspiration for heme and cerebrospinal fluid, a total of 1 mL of non-ionic contrast was injected to confirm needle placement. 9 mL of contrast was wasted. Epidurogram confirmed spread within the posterior epidural space. 2 ml of 10mg/ml of dexamethasone and 1 ml of preservative free 1% lidocaine was injected. The needle was removed.  Images were saved to PACS.    The patient tolerated the procedure well, and there was no evidence of procedural complications. No new sensory or motor deficits were noted following the procedure. The patient was stable and able to ambulate on discharge home. Post-procedure instructions were provided.     Pre-procedure pain score: 4/10 in the neck, 4/10 in the arm  Post-procedure pain score: 1-2/10 in the neck, 1-2/10 in the arm    Assessment/Plan: Louann Crocker is a 76 year old female s/p  cervical interlaminar epidural steroid injection today for cervical spondylosis and radiculitis/radiculopathy.     1. Following today's procedure, the patient was advised to contact the [facility] for any of the following:   Fever, chills, or night sweats   New onset of pain, numbness, or weakness   Any questions/concerns regarding the procedure  If unable to contact the Pain Center, the patient was instructed to go to a local Emergency Room for any complications.   2. The patient will receive a follow-up call in 1 week.   3. Follow-up with Flor Ovalle CNP in 2 weeks for post-procedure evaluation.    [facility]

## 2017-06-07 ENCOUNTER — RADIANT APPOINTMENT (OUTPATIENT)
Dept: GENERAL RADIOLOGY | Facility: CLINIC | Age: 77
End: 2017-06-07
Attending: ANESTHESIOLOGY

## 2017-06-07 ENCOUNTER — RADIOLOGY INJECTION OFFICE VISIT (OUTPATIENT)
Dept: PALLIATIVE MEDICINE | Facility: CLINIC | Age: 77
End: 2017-06-07
Payer: MEDICARE

## 2017-06-07 ENCOUNTER — MYC MEDICAL ADVICE (OUTPATIENT)
Dept: FAMILY MEDICINE | Facility: CLINIC | Age: 77
End: 2017-06-07

## 2017-06-07 VITALS — HEART RATE: 93 BPM | OXYGEN SATURATION: 97 % | DIASTOLIC BLOOD PRESSURE: 84 MMHG | SYSTOLIC BLOOD PRESSURE: 174 MMHG

## 2017-06-07 DIAGNOSIS — M54.12 CERVICAL RADICULOPATHY: Primary | ICD-10-CM

## 2017-06-07 DIAGNOSIS — M54.12 CERVICAL RADICULAR PAIN: ICD-10-CM

## 2017-06-07 PROCEDURE — 62321 NJX INTERLAMINAR CRV/THRC: CPT | Performed by: ANESTHESIOLOGY

## 2017-06-07 ASSESSMENT — PAIN SCALES - GENERAL
PAINLEVEL: MODERATE PAIN (4)
PAINLEVEL: MILD PAIN (2)

## 2017-06-07 NOTE — PATIENT INSTRUCTIONS
[facility] Procedure Discharge Instructions    Today you saw:   Dr. Cassia Baker    Your procedure:  Epidural steroid injection       Medications used:  Lidocaine (anesthetic)  Dexamethasone (steroid) Omnipaque (contrast)                Be cautious when walking as numbness and/or weakness in the legs or arms may occur up to 6-8 hours after the procedure due to effect of the local anesthetic    Do not drive for 6 hours. The effect of the local anesthetic could slow your reflexes.     Avoid strenuous activity for the first 24 hours. You may resume your regular activities after that.     You may shower, however avoid swimming, tub baths or hot tubs for 24 hours following your procedure    If you were fasting, you can resume your regular diet and medications    You may have a mild to moderate increase in pain for several days following the injection.      You may use ice packs for 10-15 minutes, 3 to 4 times a day at the injection site for comfort    Do not use heat to painful areas for 6 to 8 hours. This will give the local anesthetic time to wear off and prevent you from accidentally burning your skin.    You may use anti-inflammatory medications (such as Ibuprofen/Advil or Aleve) or Tylenol for pain control if necessary    It may take up to 14 days for the steroid medication to start working although you may feel the effect as early as a few days after the procedure.       If you experience any of the following, call the pain center nursing line during work hours at [phone] or on-call physician after hours at [phone]:  -Fever over 100 degree F  -Swelling, bleeding, redness, drainage, warmth at the injection site  -Progressive weakness or numbness in your legs or arms  -Loss of bowel or bladder function  -Unusual headache that is not relieved by Tylenol  -Unusual new onset of pain that is not improving    Phone #s:  Appointment scheduling line: [phone]

## 2017-06-07 NOTE — NURSING NOTE
Discharge Information    IV Discontiued Time:  NA    Amount of Fluid Infused:  NA    Discharge Criteria = When patient returns to baseline or as per MD order    Consciousness:  Pt is fully awake    Circulation:  BP +/- 20% of pre-procedure level    Respiration:  Patient is able to breathe deeply    O2 Sat:  Patient is able to maintain O2 Sat >92% on room air    Activity:  Moves 4 extremities on command    Ambulation:  Patient is able to stand and walk or stand and pivot into wheelchair    Dressing:  Clean/dry or No Dressing    Notes:   Discharge instructions and AVS given to patient    Patient meets criteria for discharge?  YES    Admitted to PCU?  No    Responsible adult present to accompany patient home?  Yes    Signature/Title:    Sia Emmanuel RN Care Coordinator  [facility] [place]

## 2017-06-07 NOTE — MR AVS SNAPSHOT
After Visit Summary   6/7/2017    Louann Crocker    MRN: [id number]           Patient Information     Date Of Birth          1940        Visit Information        Provider Department      6/7/2017 2:15 PM Cassia Baker MD [facility]        Care Instructions    [facility] Procedure Discharge Instructions    Today you saw:   Dr. Cassia Baker    Your procedure:  Epidural steroid injection       Medications used:  Lidocaine (anesthetic)  Dexamethasone (steroid) Omnipaque (contrast)                Be cautious when walking as numbness and/or weakness in the legs or arms may occur up to 6-8 hours after the procedure due to effect of the local anesthetic    Do not drive for 6 hours. The effect of the local anesthetic could slow your reflexes.     Avoid strenuous activity for the first 24 hours. You may resume your regular activities after that.     You may shower, however avoid swimming, tub baths or hot tubs for 24 hours following your procedure    If you were fasting, you can resume your regular diet and medications    You may have a mild to moderate increase in pain for several days following the injection.      You may use ice packs for 10-15 minutes, 3 to 4 times a day at the injection site for comfort    Do not use heat to painful areas for 6 to 8 hours. This will give the local anesthetic time to wear off and prevent you from accidentally burning your skin.    You may use anti-inflammatory medications (such as Ibuprofen/Advil or Aleve) or Tylenol for pain control if necessary    It may take up to 14 days for the steroid medication to start working although you may feel the effect as early as a few days after the procedure.       If you experience any of the following, call the pain center nursing line during work hours at [phone] or on-call physician after hours at [phone]:  -Fever over 100 degree F  -Swelling, bleeding, redness, drainage, warmth at the  injection site  -Progressive weakness or numbness in your legs or arms  -Loss of bowel or bladder function  -Unusual headache that is not relieved by Tylenol  -Unusual new onset of pain that is not improving    Phone #s:  Appointment scheduling line: [phone]          Follow-ups after your visit        Your next 10 appointments already scheduled     Jun 09, 2017  9:30 AM CDT   ANNABELLA Spine with Luciano Coreas, PT   [facility] ([facility])    [address] [facility] [address]  [facility] [address] [phone]            Jun 16, 2017  9:30 AM CDT   ANNABELLA Spine with Abi Owens, PT   [facility] ([facility])    [address] [facility] [address]  [facility] [address] [phone]              Who to contact     If you have questions or need follow up information about today's clinic visit or your schedule please contact [facility] directly at [phone].  Normal or non-critical lab and imaging results will be communicated to you by Searchdaimonhart, letter or phone within 4 business days after the clinic has received the results. If you do not hear from us within 7 days, please contact the clinic through Wheelyt or phone. If you have a critical or abnormal lab result, we will notify you by phone as soon as possible.  Submit refill requests through OncoEthix or call your pharmacy and they will forward the refill request to us. Please allow 3 business days for your refill to be completed.          Additional Information About Your Visit        OncoEthix Information     On[employer]ix gives you secure access to your electronic health record. If you see a primary care provider, you can also send messages to your care team and make appointments. If you have questions, please call your primary care clinic.  If you do not have a primary care provider, please call [phone] and they will assist you.        Care  EveryWhere ID     This is your Care EveryWhere ID. This could be used by other organizations to access your [facility] medical records  [id number]        Your Vitals Were     Pulse Pulse Oximetry                79 98%           Blood Pressure from Last 3 Encounters:   06/07/17 158/81   05/31/17 169/82   05/23/17 (P) 138/80    Weight from Last 3 Encounters:   05/31/17 83 kg (183 lb)   05/23/17 83.2 kg (183 lb 6.4 oz)   08/23/16 81.2 kg (179 lb)              Today, you had the following     No orders found for display       Primary Care Provider Office Phone # Fax #    Luciano Fonseca [phone]       [address] [address]        Thank you!     Thank you for choosing [facility]  for your care. Our goal is always to provide you with excellent care. Hearing back from our patients is one way we can continue to improve our services. Please take a few minutes to complete the written survey that you may receive in the mail after your visit with us. Thank you!             Your Updated Medication List - Protect others around you: Learn how to safely use, store and throw away your medicines at www.disposemymeds.org.          This list is accurate as of: 6/7/17  2:24 PM.  Always use your most recent med list.                   Brand Name Dispense Instructions for use    calcium carbonate-vitamin D 600-400 MG-UNIT Chew      Take 1 chew tab by mouth       cholecalciferol 5000 UNITS Caps capsule    vitamin D3     Take 5,000 Units by mouth daily       clonazePAM 0.5 MG tablet    klonoPIN     Take 0.5 tablets (0.25 mg) by mouth 4 times daily       DULOXETINE HCL PO      Take 120 mg by mouth daily Pt taking 90mg daily at bedtime. Pt takes a 60mg tablet and a 30mg tablet to equal the 90mg dose.       estradiol 0.1 MG/GM cream    ESTRACE VAGINAL    42.5 g    Place 2 g vaginally twice a week       estradiol 1 MG tablet    ESTRACE    90 tablet    Take  1 tablet (1 mg) by mouth daily       flax seed oil 1000 MG capsule      Take 1 capsule by mouth daily       lamoTRIgine 100 MG tablet    LaMICtal         lidocaine 2 % topical gel    XYLOCAINE    30 mL    Apply topically as needed for moderate pain Apply to right 3rd toe up to 3 times a day as needed for pain.       Multi-vitamin Tabs tablet   Generic drug:  multivitamin, therapeutic with minerals     30    1 TABLET DAILY       NAPROXEN PO      Take 220-440 mg by mouth 2 times daily as needed for moderate pain       OMEPRAZOLE PO      Take 20 mg by mouth every morning       PAROXETINE HCL PO      Take 20 mg by mouth At Bedtime       tolnaftate 1 % cream    TINACTIN    30 g    Apply topically 2 times daily       VITAMIN B6 PO      Take 100 mg by mouth daily

## 2017-06-08 RX ORDER — LAMOTRIGINE 100 MG/1
100 TABLET ORAL DAILY
Qty: 60 TABLET | Refills: 1 | COMMUNITY
Start: 2017-06-08 | End: 2018-05-26

## 2017-06-08 RX ORDER — DULOXETIN HYDROCHLORIDE 60 MG/1
60 CAPSULE, DELAYED RELEASE ORAL DAILY
Qty: 30 CAPSULE | Refills: 1 | COMMUNITY
Start: 2017-06-08 | End: 2018-05-26

## 2017-06-08 RX ORDER — CLONAZEPAM 0.5 MG/1
0.25 TABLET ORAL DAILY PRN
Qty: 180 TABLET | COMMUNITY
Start: 2017-06-08 | End: 2018-05-26

## 2017-06-08 RX ORDER — LAMOTRIGINE 25 MG/1
25 TABLET ORAL DAILY
Qty: 30 TABLET | Refills: 0 | COMMUNITY
Start: 2017-06-08 | End: 2018-05-26

## 2017-06-09 ENCOUNTER — THERAPY VISIT (OUTPATIENT)
Dept: PHYSICAL THERAPY | Facility: CLINIC | Age: 77
End: 2017-06-09
Payer: MEDICARE

## 2017-06-09 DIAGNOSIS — M54.2 NECK PAIN: ICD-10-CM

## 2017-06-09 PROCEDURE — 97110 THERAPEUTIC EXERCISES: CPT | Mod: GP | Performed by: PHYSICAL THERAPIST

## 2017-06-09 PROCEDURE — 97140 MANUAL THERAPY 1/> REGIONS: CPT | Mod: GP | Performed by: PHYSICAL THERAPIST

## 2017-06-12 ENCOUNTER — TELEPHONE (OUTPATIENT)
Dept: PALLIATIVE MEDICINE | Facility: CLINIC | Age: 77
End: 2017-06-12

## 2017-06-12 NOTE — TELEPHONE ENCOUNTER
Pt's  LM at 0824 on 6/12 stating that the patient is has been having headaches, upset stomach, diarrhea, and lots of pain since Cervical ANTHONY last week. Please call. Phone #[phone] or [phone]     Louisa Chadwick    [facility]

## 2017-06-13 NOTE — TELEPHONE ENCOUNTER
Called patient. LM on both numbers listed to call triage with time to reach her at    Missy Valencia  BSN-RN Care Coordinator  [facility]

## 2017-06-14 ENCOUNTER — TELEPHONE (OUTPATIENT)
Dept: FAMILY MEDICINE | Facility: CLINIC | Age: 77
End: 2017-06-14

## 2017-06-14 DIAGNOSIS — R30.0 DYSURIA: Primary | ICD-10-CM

## 2017-06-14 NOTE — TELEPHONE ENCOUNTER
" calls, wants UA checked, pt seems more confused, had recent epidural, urinary sxs on and off past week, some dysuria, urinary frequency, nausea yesterday, temp 98.6 yesterday, feels elevated as normal temp 97.6, wants SKYE MONTELONGO MD to order UA w/o visit,  a \"pharmacist\" and knows more than normal person, routed to SKYE MONTELONGO MD, please advise if ok, route to inform Amador Li RN, BSN  Message handled by Nurse Triage.      "

## 2017-06-14 NOTE — TELEPHONE ENCOUNTER
Pt informed, needs to make lab appointment to return sample  Katina Li RN, BSN  Message handled by Nurse Triage.

## 2017-06-15 ENCOUNTER — TELEPHONE (OUTPATIENT)
Dept: FAMILY MEDICINE | Facility: CLINIC | Age: 77
End: 2017-06-15

## 2017-06-15 ENCOUNTER — THERAPY VISIT (OUTPATIENT)
Dept: PHYSICAL THERAPY | Facility: CLINIC | Age: 77
End: 2017-06-15
Payer: MEDICARE

## 2017-06-15 DIAGNOSIS — R30.0 DYSURIA: ICD-10-CM

## 2017-06-15 DIAGNOSIS — M54.2 NECK PAIN: ICD-10-CM

## 2017-06-15 LAB
ALBUMIN UR-MCNC: ABNORMAL MG/DL
APPEARANCE UR: CLEAR
BACTERIA #/AREA URNS HPF: ABNORMAL /HPF
BILIRUB UR QL STRIP: ABNORMAL
COLOR UR AUTO: YELLOW
GLUCOSE UR STRIP-MCNC: NEGATIVE MG/DL
HGB UR QL STRIP: NEGATIVE
KETONES UR STRIP-MCNC: ABNORMAL MG/DL
LEUKOCYTE ESTERASE UR QL STRIP: NEGATIVE
MUCOUS THREADS #/AREA URNS LPF: PRESENT /LPF
NITRATE UR QL: NEGATIVE
NON-SQ EPI CELLS #/AREA URNS LPF: ABNORMAL /LPF
PH UR STRIP: 5.5 PH (ref 5–7)
RBC #/AREA URNS AUTO: ABNORMAL /HPF (ref 0–2)
SP GR UR STRIP: 1.02 (ref 1–1.03)
URN SPEC COLLECT METH UR: ABNORMAL
UROBILINOGEN UR STRIP-ACNC: 1 EU/DL (ref 0.2–1)
WBC #/AREA URNS AUTO: ABNORMAL /HPF (ref 0–2)

## 2017-06-15 PROCEDURE — 97140 MANUAL THERAPY 1/> REGIONS: CPT | Mod: GP | Performed by: PHYSICAL THERAPIST

## 2017-06-15 PROCEDURE — 81001 URINALYSIS AUTO W/SCOPE: CPT | Performed by: FAMILY MEDICINE

## 2017-06-15 PROCEDURE — 97110 THERAPEUTIC EXERCISES: CPT | Mod: GP | Performed by: PHYSICAL THERAPIST

## 2017-06-15 NOTE — TELEPHONE ENCOUNTER
Called pt. States that she is doing better. Upset stomach and diarrhea have dissipated. Having fewer headaches since injection. Still having pain in her neck and under her shoulder blade and left arm is still weak. These are all the same types of pain that she had been experiencing before the injection. Let her know that she may receive more pain relief from the injection since it may take up to 14 days for the steroid to start working. Pt stated understanding and will call with any other questions or concerns.     CHASE Jones, RN-BC  Patient Care Supervisor/Care Coordinator  [facility] [place]

## 2017-06-15 NOTE — PROGRESS NOTES
Discharge Note    Progress reporting period is from initial eval to Aamir 15, 2017.     Louann failed to return for next follow up visit and current status is unknown.  Please see information below for last relevant information on current status.  Patient seen for Rxs Used: 3 visits.  SUBJECTIVE  Subjective changes noted by patient:  Subjective: Better.  Less pain with HA's   .  Current pain level is  .     Previous pain level was  Initial Pain level: 5/10.   Changes in function:  Yes (See Goal flowsheet attached for changes in current functional level)  Adverse reaction to treatment or activity: None    OBJECTIVE  Changes noted in objective findings: Objective: L and R rotation 50% with ERP      ASSESSMENT/PLAN  Diagnosis: neck pain with L UE pain   DIAGP:  The encounter diagnosis was Neck pain.  Updated problem list and treatment plan:   Pain - HEP  Decreased ROM/flexibility - HEP  STG/LTGs have been met or progress has been made towards goals:  Yes, please see goal flowsheet for most current information  Assessment of Progress: current status is unknown.  Last current status:     Self Management Plans:  HEP  I have re-evaluated this patient and find that the nature, scope, duration and intensity of the therapy is appropriate for the medical condition of the patient.  Louann continues to require the following intervention to meet STG and LTG's:  HEP.    Recommendations:  Discharge with current home program.  Patient to follow up with MD as needed.    Please refer to the daily flowsheet for treatment today, total treatment time and time spent performing 1:1 timed codes.

## 2017-06-15 NOTE — TELEPHONE ENCOUNTER
Pt stopped for UA today due to dysuria  See results  Route to provider to review and advise    Helio RN Nurse Triage

## 2017-06-15 NOTE — MR AVS SNAPSHOT
After Visit Summary   6/15/2017    Louann Crocker    MRN: [id number]           Patient Information     Date Of Birth          1940        Visit Information        Provider Department      6/15/2017 11:10 AM Luciano Coreas PT [facility] Physical Therapy        Today's Diagnoses     Neck pain           Follow-ups after your visit        Who to contact     If you have questions or need follow up information about today's clinic visit or your schedule please contact [facility] directly at [phone].  Normal or non-critical lab and imaging results will be communicated to you by FeedMagnethart, letter or phone within 4 business days after the clinic has received the results. If you do not hear from us within 7 days, please contact the clinic through Entrispheret or phone. If you have a critical or abnormal lab result, we will notify you by phone as soon as possible.  Submit refill requests through Soshowise or call your pharmacy and they will forward the refill request to us. Please allow 3 business days for your refill to be completed.          Additional Information About Your Visit        MyChart Information     Soshowise gives you secure access to your electronic health record. If you see a primary care provider, you can also send messages to your care team and make appointments. If you have questions, please call your primary care clinic.  If you do not have a primary care provider, please call [phone] and they will assist you.        Care EveryWhere ID     This is your Care EveryWhere ID. This could be used by other organizations to access your [facility] medical records  [id number]         Blood Pressure from Last 3 Encounters:   06/07/17 174/84   05/31/17 169/82   05/23/17 (P) 138/80    Weight from Last 3 Encounters:   05/31/17 83 kg (183 lb)   05/23/17 83.2 kg (183 lb 6.4 oz)   08/23/16 81.2 kg (179 lb)               We Performed the Following     MANUAL THER TECH,1+REGIONS,EA 15 MIN     THERAPEUTIC EXERCISES        Primary Care Provider Office Phone # Fax #    Luciano Fonseca [phone]       [address] [address]        Thank you!     Thank you for choosing [facility]  for your care. Our goal is always to provide you with excellent care. Hearing back from our patients is one way we can continue to improve our services. Please take a few minutes to complete the written survey that you may receive in the mail after your visit with us. Thank you!             Your Updated Medication List - Protect others around you: Learn how to safely use, store and throw away your medicines at www.disposemymeds.org.          This list is accurate as of: 6/15/17  1:22 PM.  Always use your most recent med list.                   Brand Name Dispense Instructions for use    calcium carbonate-vitamin D 600-400 MG-UNIT Chew      Take 1 chew tab by mouth       cholecalciferol 5000 UNITS Caps capsule    vitamin D3     Take 5,000 Units by mouth daily       clonazePAM 0.5 MG tablet    klonoPIN    180 tablet    Take 0.5 tablets (0.25 mg) by mouth daily as needed for anxiety       DULoxetine 60 MG EC capsule    CYMBALTA    30 capsule    Take 1 capsule (60 mg) by mouth daily       estradiol 0.1 MG/GM cream    ESTRACE VAGINAL    42.5 g    Place 2 g vaginally twice a week       estradiol 1 MG tablet    ESTRACE    90 tablet    Take 1 tablet (1 mg) by mouth daily       flax seed oil 1000 MG capsule      Take 1 capsule by mouth daily       * lamoTRIgine 25 MG tablet    LaMICtal    30 tablet    Take 1 tablet (25 mg) by mouth daily Take with 100 mg tablet to =125 mg total       * lamoTRIgine 100 MG tablet    LaMICtal    60 tablet    Take 1 tablet (100 mg) by mouth daily Take with 25 mg tablet to =125 mg total       Multi-vitamin  Tabs tablet   Generic drug:  multivitamin, therapeutic with minerals     30    1 TABLET DAILY       NAPROXEN PO      Take 220-440 mg by mouth 2 times daily as needed for moderate pain       OMEPRAZOLE PO      Take 20 mg by mouth every morning       VITAMIN B6 PO      Take 100 mg by mouth daily       * Notice:  This list has 2 medication(s) that are the same as other medications prescribed for you. Read the directions carefully, and ask your doctor or other care provider to review them with you.

## 2017-07-07 ENCOUNTER — TELEPHONE (OUTPATIENT)
Dept: OBGYN | Facility: CLINIC | Age: 77
End: 2017-07-07

## 2017-07-07 NOTE — TELEPHONE ENCOUNTER
Fax received from [employer] stating that Estradiol requires a PA. Call to pharmacy. States pt paid cash for this med. Per chart PA was done previously but  17. Call to pt. Will pursue PA. Attempted to file PA on Covermymeds but questions need to be answered by MD. Form printed and given to Jolene as she is working with Dr. Mccarthy on Monday in [place]. Key code [id number]    Last PA done 16 by Dr. Mcgrath's office    Luciano Fonseca MD        1:03 PM   Note      Med is for menopause, stable on med for over five years.request pa              Dr. Mccarthy-please answer questions 1-5 and return to triage nurse for completion of PA.

## 2017-07-13 NOTE — TELEPHONE ENCOUNTER
Received fax from [employer] stating that Estradiol tablet coverage has been approved from 4/13/17 through 7/12/2018. Approval letter mailed to pt. Fax sent to scan into pt chart.  Sonia Hancock CMA

## 2017-08-18 ENCOUNTER — TRANSFERRED RECORDS (OUTPATIENT)
Dept: HEALTH INFORMATION MANAGEMENT | Facility: CLINIC | Age: 77
End: 2017-08-18

## 2017-09-11 ENCOUNTER — TRANSFERRED RECORDS (OUTPATIENT)
Dept: HEALTH INFORMATION MANAGEMENT | Facility: CLINIC | Age: 77
End: 2017-09-11

## 2017-09-12 ENCOUNTER — OFFICE VISIT (OUTPATIENT)
Dept: OBGYN | Facility: CLINIC | Age: 77
End: 2017-09-12
Payer: MEDICARE

## 2017-09-12 VITALS
SYSTOLIC BLOOD PRESSURE: 148 MMHG | HEIGHT: 63 IN | DIASTOLIC BLOOD PRESSURE: 80 MMHG | BODY MASS INDEX: 32.48 KG/M2 | WEIGHT: 183.3 LBS | TEMPERATURE: 98.3 F

## 2017-09-12 DIAGNOSIS — N89.8 VAGINAL ITCHING: ICD-10-CM

## 2017-09-12 DIAGNOSIS — R21 RASH: Primary | ICD-10-CM

## 2017-09-12 DIAGNOSIS — N95.1 MENOPAUSAL SYNDROME (HOT FLASHES): ICD-10-CM

## 2017-09-12 DIAGNOSIS — N95.1 SYMPTOMATIC MENOPAUSAL OR FEMALE CLIMACTERIC STATES: ICD-10-CM

## 2017-09-12 DIAGNOSIS — N95.2 VAGINAL ATROPHY: ICD-10-CM

## 2017-09-12 LAB
SPECIMEN SOURCE: NORMAL
WET PREP SPEC: NORMAL

## 2017-09-12 PROCEDURE — 87210 SMEAR WET MOUNT SALINE/INK: CPT | Performed by: FAMILY MEDICINE

## 2017-09-12 PROCEDURE — 99213 OFFICE O/P EST LOW 20 MIN: CPT | Performed by: FAMILY MEDICINE

## 2017-09-12 RX ORDER — NYSTATIN 100000 [USP'U]/G
POWDER TOPICAL 3 TIMES DAILY PRN
Qty: 30 G | Refills: 1 | Status: SHIPPED | OUTPATIENT
Start: 2017-09-12 | End: 2018-07-02

## 2017-09-12 RX ORDER — ESTRADIOL 0.1 MG/G
2 CREAM VAGINAL
Qty: 42.5 G | Refills: 11 | Status: SHIPPED | OUTPATIENT
Start: 2017-09-14 | End: 2018-07-02

## 2017-09-12 RX ORDER — ESTRADIOL 1 MG/1
1 TABLET ORAL DAILY
Qty: 90 TABLET | Refills: 3 | Status: SHIPPED | OUTPATIENT
Start: 2017-09-12 | End: 2018-05-26

## 2017-09-12 RX ORDER — NYSTATIN 100000 U/G
CREAM TOPICAL 3 TIMES DAILY
Qty: 30 G | Refills: 1 | Status: SHIPPED | OUTPATIENT
Start: 2017-09-12 | End: 2017-09-26

## 2017-09-12 RX ORDER — FLUTICASONE PROPIONATE 0.05 MG/G
1 OINTMENT TOPICAL 2 TIMES DAILY
Qty: 1 TUBE | Refills: 11 | Status: SHIPPED | OUTPATIENT
Start: 2017-09-12 | End: 2021-09-22

## 2017-09-12 NOTE — PROGRESS NOTES
SUBJECTIVE:  Louann Crocker is an 77 year old  woman who presents for yearly estradiol refill, breast rash, and vaginal itching.  She has vaginal itching every 1 or 2 weeks and puts hydrocortisone cream on it and the itching resolves.    Louann requests a one year refill of estradiol. She complains of intermittent vaginal itching that does not itch or hurt, which she reports it is not present today. The patient states she has experienced this many times before, usually every week or two, but this time the hydrocortisone cream that usually relives it has not been effective. Louann also complains of a rash beneath both breasts. The patient notes she sweats often when nervous or slightly warm. Denies itching of the breast.     SOC: She is leaving for [place] on 2017.     Past Medical History:   Diagnosis Date     Anxiety      DDD (degenerative disc disease), cervical      DDD (degenerative disc disease), lumbar      Fibromyalgia      GERD (gastroesophageal reflux disease)      IBS (irritable bowel syndrome)      Major depressive disorder, single episode, severe, without mention of psychotic behavior     ECT x 16          Family History   Problem Relation Age of Onset     Arthritis Mother      fibromyalgia     OSTEOPOROSIS Mother      CANCER Mother      colon     Hypertension Father      CEREBROVASCULAR DISEASE Father      Respiratory Brother      emphysema       Past Surgical History:   Procedure Laterality Date     ARTHROSCOPY KNEE RT/LT       BACK SURGERY  2013    Sacroileac Fusion     BACK SURGERY  10/6/2014    Lumbar Fusion L3-L-4, L-4L-5     C FUSION MC-P JOINT,SINGLE       C FUSION SHOULDER JOINT      lap bone sput     C ULLOA W/O FACETEC FORAMOT/DSKC  VRT SEG, LUMBAR       C LAP,BILIARY TRACT,UNLISTED       C LAPAROSCOPY, SURGICAL; W/ VAGINAL HYSTERECTOMY W/WO REMOVAL OVARY(S)/TUBES      Laparoscopy, Vag Hysterectomy     CATARACT IOL, RT/LT        COLONOSCOPY       ENDOSCOPIC RETROGRADE CHOLANGIOPANCREATOGRAPHY  2005     HC DILATION/CURETTAGE DIAG/THER NON OB  1961    D & C     HC ERCP W SPHINCTEROTOMY  12/09    CBD stones     HC REMOVAL GALLBLADDER  1964    Cholecystectomy     HC REPAIR ROTATOR CUFF,ACUTE  3/90     SURGICAL HISTORY OF -   2005    cervical     SURGICAL HISTORY OF -   2005    lumbar      SURGICAL HISTORY OF -   2008    right knee replacement     VITRECTOMY PARSPLANA WITH 25 GAUGE SYSTEM  5/10/2012    Procedure:VITRECTOMY PARSPLANA WITH 25 GAUGE SYSTEM; LEFT EYE 25 GAUGE VITRECTOMY, MEMBRANE STRIPPING, AIR  FLUID EXCHANGE, GAS 15% C3F8; Surgeon:ANTONIETTA WALTERS; [facility]       Current Outpatient Prescriptions   Medication     lamoTRIgine (LAMICTAL) 25 MG tablet     lamoTRIgine (LAMICTAL) 100 MG tablet     DULoxetine (CYMBALTA) 60 MG EC capsule     estradiol (ESTRACE) 1 MG tablet     calcium carbonate-vitamin D 600-400 MG-UNIT CHEW     Pyridoxine HCl (VITAMIN B6 PO)     estradiol (ESTRACE VAGINAL) 0.1 MG/GM vaginal cream     cholecalciferol (VITAMIN D3) 5000 UNITS CAPS capsule     NAPROXEN PO     OMEPRAZOLE PO     MULTI-VITAMIN OR TABS     clonazePAM (KLONOPIN) 0.5 MG tablet     Flaxseed, Linseed, (FLAX SEED OIL) 1000 MG capsule     No current facility-administered medications for this visit.      Allergies   Allergen Reactions     Adhesive Tape      Flagyl [Metronidazole Hcl]      Imidazole antifungals, HIVES & RASH     Nickel      rash       Social History   Substance Use Topics     Smoking status: Former Smoker     Packs/day: 1.00     Years: 20.00     Types: Cigarettes     Quit date: 1/1/1977     Smokeless tobacco: Never Used     Alcohol use Yes      Comment: on occasion       Review Of Systems  Ears/Nose/Throat: negative  Respiratory: No shortness of breath, dyspnea on exertion, cough, or hemoptysis  Cardiovascular: negative  Gastrointestinal: negative  Genitourinary: see HPI  Breast: see HPI    This document serves as a record of the  "services and decisions personally performed and made by Ramya Mccarthy DO. It was created on his/her behalf by Amy Lipscomb, a trained medical scribe. The creation of this document is based the provider's statements to the medical scribe.  Chastity Lipscomb 8:26 AM, 2017    OBJECTIVE:  /80  Temp 98.3  F (36.8  C) (Oral)  Ht 1.6 m (5' 3\")  Wt 83.1 kg (183 lb 4.8 oz)  BMI 32.47 kg/m2  General appearance: healthy, alert and no distress  Breasts: positive findings: rash under the breast bilaterally  Pelvic: positive findings:  Erythema  and vaginal discharge - scant and moderate    ASSESSMENT:  Louann Crocker is an 77 year old  woman who presents for yearly estradiol refill. Today the following concerns were addressed:    PLAN:  Dx:   1)  Rash beneath bilateral breast: heat rash, Rx nystatin cream, nystatin powder, fluticasone propionate ointment  2)  Vaginal itching: erythema with associated discharge, Rx  Estradiol vaginal cream  3) Prescription refill: Rx estradiol po  4) Return to clinic yearly or prn    Rx:  Estradiol vaginal 0.1 mg/gm cream 2g 2x weekly   Estradiol 1 mg tab po daily  Fluticasone propionate 0.005% ointment 1 g topical BID  Nystatin 952240 unit/gm powder 3x daily prn  Nystatin cream 3x daily for 14 days      The information in this document, created by the medical scribe for me, accurately reflects the services I personally performed and the decisions made by me. I have reviewed and approved this document for accuracy prior to leaving the patient care area.  Ramya Mccarthy DO  8:26 AM, 17    Dr. Ramya Mccarthy DO    OB/GYN   [facility]    "

## 2017-09-12 NOTE — NURSING NOTE
"Chief Complaint   Patient presents with     Gyn Exam     vaginal itching--has rash under breast--it comes and goes--not painful or itchy--needs refill on estradiol cream and pills       Initial /80  Temp 98.3  F (36.8  C) (Oral)  Ht 5' 3\" (1.6 m)  Wt 183 lb 4.8 oz (83.1 kg)  BMI 32.47 kg/m2 Estimated body mass index is 32.47 kg/(m^2) as calculated from the following:    Height as of this encounter: 5' 3\" (1.6 m).    Weight as of this encounter: 183 lb 4.8 oz (83.1 kg).  Medication Reconciliation: complete   Sonia Hancock CMA      "

## 2017-09-12 NOTE — PATIENT INSTRUCTIONS
Return yearly   Please come back!     Dr. Ramya Mccarthy, DO    Obstetrics and Gynecology  [facility]

## 2017-09-12 NOTE — MR AVS SNAPSHOT
After Visit Summary   9/12/2017    Louann Crocker    MRN: [id number]           Patient Information     Date Of Birth          1940        Visit Information        Provider Department      9/12/2017 8:00 AM Ramya Mccarthy, DO [facility]        Today's Diagnoses     Rash    -  1    Vaginal itching        Vaginal atrophy        FEMALE CLIMACTERIC STATE        Menopausal syndrome (hot flashes)          Care Instructions    Return yearly   Please come back!     Dr. Ramya Mccarthy, DO    Obstetrics and Gynecology  [facility] [place]                 Follow-ups after your visit        Who to contact     If you have questions or need follow up information about today's clinic visit or your schedule please contact [facility] directly at [phone].  Normal or non-critical lab and imaging results will be communicated to you by Synacorhart, letter or phone within 4 business days after the clinic has received the results. If you do not hear from us within 7 days, please contact the clinic through Synacorhart or phone. If you have a critical or abnormal lab result, we will notify you by phone as soon as possible.  Submit refill requests through Sapheon or call your pharmacy and they will forward the refill request to us. Please allow 3 business days for your refill to be completed.          Additional Information About Your Visit        MyChart Information     Sapheon gives you secure access to your electronic health record. If you see a primary care provider, you can also send messages to your care team and make appointments. If you have questions, please call your primary care clinic.  If you do not have a primary care provider, please call [phone] and they will assist you.        Care EveryWhere ID     This is your Care EveryWhere ID. This could be used by other organizations to access your [facility] medical records  [id number]        Your  "Vitals Were     Temperature Height BMI (Body Mass Index)             98.3  F (36.8  C) (Oral) 5' 3\" (1.6 m) 32.47 kg/m2          Blood Pressure from Last 3 Encounters:   09/12/17 148/80   06/07/17 174/84   05/31/17 169/82    Weight from Last 3 Encounters:   09/12/17 183 lb 4.8 oz (83.1 kg)   05/31/17 183 lb (83 kg)   05/23/17 183 lb 6.4 oz (83.2 kg)              We Performed the Following     Wet prep          Today's Medication Changes          These changes are accurate as of: 9/12/17  8:32 AM.  If you have any questions, ask your nurse or doctor.               Start taking these medicines.        Dose/Directions    fluticasone propionate 0.005 % ointment   Commonly known as:  CUTIVATE   Used for:  Rash   Started by:  Ramya Mccarthy DO        Dose:  1 g   Apply 1 g topically 2 times daily   Quantity:  1 Tube   Refills:  11       * nystatin 951732 UNIT/GM Powd   Commonly known as:  MYCOSTATIN   Used for:  Rash   Started by:  Ramya Mccarthy DO        Apply topically 3 times daily as needed   Quantity:  30 g   Refills:  1       * nystatin cream   Commonly known as:  MYCOSTATIN   Used for:  Rash   Started by:  Ramya Mccarthy DO        Apply topically 3 times daily for 14 days   Quantity:  30 g   Refills:  1       * Notice:  This list has 2 medication(s) that are the same as other medications prescribed for you. Read the directions carefully, and ask your doctor or other care provider to review them with you.         Where to get your medicines      These medications were sent to [id number] - [place], [place] - [address]  [address], [facility] [address]     Phone:  [phone]     estradiol 0.1 MG/GM cream    estradiol 1 MG tablet    fluticasone propionate 0.005 % ointment    nystatin 912049 UNIT/GM Powd    nystatin cream                Primary Care Provider Office Phone # Fax #    Luciano Fonseca [phone] " [phone]       [address] [facility] [address]        Equal Access to Services     DANI CHAKA : Hadii aad ku hadbingtemi Snehalali, wachazda komalgerardoha, asmita kagumaro belenmelanie, philipp hayes augustoswetha glovergina murray juan dotson. So [facility] [phone].    ATENCIÓN: Si habla español, tiene a adam disposición servicios gratuitos de asistencia lingüística. Llame al [phone].    We comply with applicable federal civil rights laws and [place] laws. We do not discriminate on the basis of race, color, national origin, age, disability sex, sexual orientation or gender identity.            Thank you!     Thank you for choosing [facility]  for your care. Our goal is always to provide you with excellent care. Hearing back from our patients is one way we can continue to improve our services. Please take a few minutes to complete the written survey that you may receive in the mail after your visit with us. Thank you!             Your Updated Medication List - Protect others around you: Learn how to safely use, store and throw away your medicines at www.disposemymeds.org.          This list is accurate as of: 9/12/17  8:32 AM.  Always use your most recent med list.                   Brand Name Dispense Instructions for use Diagnosis    calcium carbonate-vitamin D 600-400 MG-UNIT Chew      Take 1 chew tab by mouth        cholecalciferol 5000 UNITS Caps capsule    vitamin D3     Take 5,000 Units by mouth daily        clonazePAM 0.5 MG tablet    klonoPIN    180 tablet    Take 0.5 tablets (0.25 mg) by mouth daily as needed for anxiety        DULoxetine 60 MG EC capsule    CYMBALTA    30 capsule    Take 1 capsule (60 mg) by mouth daily        estradiol 0.1 MG/GM cream   Start taking on:  9/14/2017    ESTRACE VAGINAL    42.5 g    Place 2 g vaginally twice a week    Vaginal atrophy       estradiol 1 MG tablet    ESTRACE    90 tablet    Take 1 tablet (1 mg) by mouth daily    Symptomatic menopausal or female climacteric  states, Menopausal syndrome (hot flashes)       flax seed oil 1000 MG capsule      Take 1 capsule by mouth daily        fluticasone propionate 0.005 % ointment    CUTIVATE    1 Tube    Apply 1 g topically 2 times daily    Rash       * lamoTRIgine 25 MG tablet    LaMICtal    30 tablet    Take 1 tablet (25 mg) by mouth daily Take with 100 mg tablet to =125 mg total        * lamoTRIgine 100 MG tablet    LaMICtal    60 tablet    Take 1 tablet (100 mg) by mouth daily Take with 25 mg tablet to =125 mg total        Multi-vitamin Tabs tablet   Generic drug:  multivitamin, therapeutic with minerals     30    1 TABLET DAILY        NAPROXEN PO      Take 220-440 mg by mouth 2 times daily as needed for moderate pain        * nystatin 061349 UNIT/GM Powd    MYCOSTATIN    30 g    Apply topically 3 times daily as needed    Rash       * nystatin cream    MYCOSTATIN    30 g    Apply topically 3 times daily for 14 days    Rash       OMEPRAZOLE PO      Take 20 mg by mouth every morning        VITAMIN B6 PO      Take 100 mg by mouth daily        * Notice:  This list has 4 medication(s) that are the same as other medications prescribed for you. Read the directions carefully, and ask your doctor or other care provider to review them with you.

## 2018-04-05 ENCOUNTER — TRANSFERRED RECORDS (OUTPATIENT)
Dept: HEALTH INFORMATION MANAGEMENT | Facility: CLINIC | Age: 78
End: 2018-04-05

## 2018-04-17 ENCOUNTER — TRANSFERRED RECORDS (OUTPATIENT)
Dept: HEALTH INFORMATION MANAGEMENT | Facility: CLINIC | Age: 78
End: 2018-04-17

## 2018-04-21 ENCOUNTER — MYC MEDICAL ADVICE (OUTPATIENT)
Dept: FAMILY MEDICINE | Facility: CLINIC | Age: 78
End: 2018-04-21

## 2018-05-22 ASSESSMENT — ACTIVITIES OF DAILY LIVING (ADL)
CURRENT_FUNCTION: HOUSEWORK REQUIRES ASSISTANCE
I_NEED_ASSISTANCE_FOR_THE_FOLLOWING_DAILY_ACTIVITIES:: TRANSPORTATION
CURRENT_FUNCTION: TRANSPORTATION REQUIRES ASSISTANCE
CURRENT_FUNCTION: MEDICATION ADMINISTRATION REQUIRES ASSISTANCE
I_NEED_ASSISTANCE_FOR_THE_FOLLOWING_DAILY_ACTIVITIES:: HOUSEWORK
I_NEED_ASSISTANCE_FOR_THE_FOLLOWING_DAILY_ACTIVITIES:: MEDICATION ADMINISTRATION

## 2018-05-25 ENCOUNTER — OFFICE VISIT (OUTPATIENT)
Dept: FAMILY MEDICINE | Facility: CLINIC | Age: 78
End: 2018-05-25
Payer: MEDICARE

## 2018-05-25 VITALS
WEIGHT: 172.1 LBS | HEIGHT: 63 IN | RESPIRATION RATE: 14 BRPM | HEART RATE: 77 BPM | BODY MASS INDEX: 30.49 KG/M2 | OXYGEN SATURATION: 98 % | TEMPERATURE: 97.7 F | DIASTOLIC BLOOD PRESSURE: 78 MMHG | SYSTOLIC BLOOD PRESSURE: 138 MMHG

## 2018-05-25 DIAGNOSIS — M54.42 CHRONIC BILATERAL LOW BACK PAIN WITH LEFT-SIDED SCIATICA: ICD-10-CM

## 2018-05-25 DIAGNOSIS — N39.41 URGENCY INCONTINENCE: ICD-10-CM

## 2018-05-25 DIAGNOSIS — G89.29 CHRONIC BILATERAL LOW BACK PAIN WITH LEFT-SIDED SCIATICA: ICD-10-CM

## 2018-05-25 DIAGNOSIS — M47.812 CERVICAL SPONDYLOSIS WITHOUT MYELOPATHY: ICD-10-CM

## 2018-05-25 DIAGNOSIS — Z00.00 MEDICARE ANNUAL WELLNESS VISIT, SUBSEQUENT: ICD-10-CM

## 2018-05-25 DIAGNOSIS — Z11.59 ENCOUNTER FOR HCV SCREENING TEST FOR LOW RISK PATIENT: Primary | ICD-10-CM

## 2018-05-25 PROCEDURE — G0439 PPPS, SUBSEQ VISIT: HCPCS | Performed by: FAMILY MEDICINE

## 2018-05-25 PROCEDURE — 36415 COLL VENOUS BLD VENIPUNCTURE: CPT | Performed by: FAMILY MEDICINE

## 2018-05-25 PROCEDURE — 86803 HEPATITIS C AB TEST: CPT | Performed by: FAMILY MEDICINE

## 2018-05-25 ASSESSMENT — ACTIVITIES OF DAILY LIVING (ADL)
CURRENT_FUNCTION: MEDICATION ADMINISTRATION REQUIRES ASSISTANCE
CURRENT_FUNCTION: HOUSEWORK REQUIRES ASSISTANCE
CURRENT_FUNCTION: TRANSPORTATION REQUIRES ASSISTANCE

## 2018-05-25 NOTE — MR AVS SNAPSHOT
After Visit Summary   5/25/2018    Louann Crocker    MRN: [id number]           Patient Information     Date Of Birth          1940        Visit Information        Provider Department      5/25/2018 8:45 AM Luciano Fonseca MD [facility]        Today's Diagnoses     Encounter for HCV screening test for low risk patient    -  1    Medicare annual wellness visit, subsequent        Urgency incontinence        Chronic bilateral low back pain with left-sided sciatica        Cervical spondylosis without myelopathy          Care Instructions      Preventive Health Recommendations  Female Ages 65 +    Yearly exam:     See your health care provider every year in order to  o Review health changes.   o Discuss preventive care.    o Review your medicines if your doctor has prescribed any.      You no longer need a yearly Pap test unless you've had an abnormal Pap test in the past 10 years. If you have vaginal symptoms, such as bleeding or discharge, be sure to talk with your provider about a Pap test.      Every 1 to 2 years, have a mammogram.  If you are over 69, talk with your health care provider about whether or not you want to continue having screening mammograms.      Every 10 years, have a colonoscopy. Or, have a yearly FIT test (stool test). These exams will check for colon cancer.       Have a cholesterol test every 5 years, or more often if your doctor advises it.       Have a diabetes test (fasting glucose) every three years. If you are at risk for diabetes, you should have this test more often.       At age 65, have a bone density scan (DEXA) to check for osteoporosis (brittle bone disease).    Shots:    Get a flu shot each year.    Get a tetanus shot every 10 years.    Talk to your doctor about your pneumonia vaccines. There are now two you should receive - Pneumovax (PPSV 23) and Prevnar (PCV 13).    Talk to your doctor about the shingles vaccine.    Talk to your  doctor about the hepatitis B vaccine.    Nutrition:     Eat at least 5 servings of fruits and vegetables each day.      Eat whole-grain bread, whole-wheat pasta and brown rice instead of white grains and rice.      Talk to your provider about Calcium and Vitamin D.     Lifestyle    Exercise at least 150 minutes a week (30 minutes a day, 5 days a week). This will help you control your weight and prevent disease.      Limit alcohol to one drink per day.      No smoking.       Wear sunscreen to prevent skin cancer.       See your dentist twice a year for an exam and cleaning.      See your eye doctor every 1 to 2 years to screen for conditions such as glaucoma, macular degeneration, cataracts, etc           Follow-ups after your visit        Additional Services     OB/GYN REFERRAL       Your provider has referred you to:  [facility]: [facility] [phone]   [email or web address]      Dr. Chapa re: Bladder Control Issues     Please be aware that coverage of these services is subject to the terms and limitations of your health insurance plan.  Call member services at your health plan with any benefit or coverage questions.      Please bring the following with you to your appointment:    (1) Any X-Rays, CTs or MRIs which have been performed.  Contact the facility where they were done to arrange for  prior to your scheduled appointment.   (2) List of current medications   (3) This referral request   (4) Any documents/labs given to you for this referral            ORTHO  REFERRAL       [facility] is referring you to the Orthopedic  Services at [facility].     Medical Spine S  The  Representative will assist you in the coordination of your Orthopedic and Musculoskeletal Care as prescribed by your physician.    The  Representative will call you within 1 business day to help schedule your  appointment, or you may contact the [facility] Representative at:    All areas ~ [phone]     Type of Referral :        Timeframe requested: Routine    Coverage of these services is subject to the terms and limitations of your health insurance plan.  Please call member services at your health plan with any benefit or coverage questions.      If X-rays, CT or MRI's have been performed, please contact the facility where they were done to arrange for , prior to your scheduled appointment.  Please bring this referral request to your appointment and present it to your specialist.                  Who to contact     If you have questions or need follow up information about today's clinic visit or your schedule please contact [facility] directly at [phone].  Normal or non-critical lab and imaging results will be communicated to you by GeniusMatcherhart, letter or phone within 4 business days after the clinic has received the results. If you do not hear from us within 7 days, please contact the clinic through GeniusMatcherhart or phone. If you have a critical or abnormal lab result, we will notify you by phone as soon as possible.  Submit refill requests through Peter Blueberry or call your pharmacy and they will forward the refill request to us. Please allow 3 business days for your refill to be completed.          Additional Information About Your Visit        Peter Blueberry Information     Peter Blueberry gives you secure access to your electronic health record. If you see a primary care provider, you can also send messages to your care team and make appointments. If you have questions, please call your primary care clinic.  If you do not have a primary care provider, please call [phone] and they will assist you.        Care EveryWhere ID     This is your Care EveryWhere ID. This could be used by other organizations to access your [facility] medical records  [id number]        Your Vitals Were     Pulse Temperature  "Respirations Height Pulse Oximetry BMI (Body Mass Index)    77 97.7  F (36.5  C) (Oral) 14 5' 3\" (1.6 m) 98% 30.49 kg/m2       Blood Pressure from Last 3 Encounters:   05/25/18 174/82   09/12/17 148/80   06/07/17 174/84    Weight from Last 3 Encounters:   05/25/18 172 lb 1.6 oz (78.1 kg)   09/12/17 183 lb 4.8 oz (83.1 kg)   05/31/17 183 lb (83 kg)              We Performed the Following     **Hepatitis C Screen Reflex to RNA FUTURE anytime     DEPRESSION ACTION PLAN (DAP)     OB/GYN REFERRAL     ORTHO  REFERRAL        Primary Care Provider Office Phone # Fax #    Luciano Fonseca [phone] [address] [facility] [address]        Equal Access to Services     [facility] : Hadii nicole alva hadasho Soomaali, waaxda luqadaha, qaybta kaalmada adeegyada, philipp hayes haypavel martinez . So [facility] [phone].    ATENCIÓN: Si habla español, tiene a adam disposición servicios gratuitos de asistencia lingüística. Lorenareanna al [phone].    We comply with applicable federal civil rights laws and [place] laws. We do not discriminate on the basis of race, color, national origin, age, disability, sex, sexual orientation, or gender identity.            Thank you!     Thank you for choosing [facility]  for your care. Our goal is always to provide you with excellent care. Hearing back from our patients is one way we can continue to improve our services. Please take a few minutes to complete the written survey that you may receive in the mail after your visit with us. Thank you!             Your Updated Medication List - Protect others around you: Learn how to safely use, store and throw away your medicines at www.disposemymeds.org.          This list is accurate as of 5/25/18  9:23 AM.  Always use your most recent med list.                   Brand Name Dispense Instructions for use Diagnosis    calcium carbonate-vitamin D 600-400 MG-UNIT Chew      Take 1 chew tab " by mouth        cholecalciferol 5000 units Caps capsule    vitamin D3     Take 5,000 Units by mouth daily        clonazePAM 0.5 MG tablet    klonoPIN    180 tablet    Take 0.5 tablets (0.25 mg) by mouth daily as needed for anxiety        DULoxetine 60 MG EC capsule    CYMBALTA    30 capsule    Take 1 capsule (60 mg) by mouth daily        estradiol 0.1 MG/GM cream    ESTRACE VAGINAL    42.5 g    Place 2 g vaginally twice a week    Vaginal atrophy       estradiol 1 MG tablet    ESTRACE    90 tablet    Take 1 tablet (1 mg) by mouth daily    Symptomatic menopausal or female climacteric states, Menopausal syndrome (hot flashes)       flax seed oil 1000 MG capsule      Take 1 capsule by mouth daily        fluticasone propionate 0.005 % ointment    CUTIVATE    1 Tube    Apply 1 g topically 2 times daily    Rash       * lamoTRIgine 25 MG tablet    LaMICtal    30 tablet    Take 1 tablet (25 mg) by mouth daily Take with 100 mg tablet to =125 mg total        * lamoTRIgine 100 MG tablet    LaMICtal    60 tablet    Take 1 tablet (100 mg) by mouth daily Take with 25 mg tablet to =125 mg total        Multi-vitamin Tabs tablet   Generic drug:  multivitamin, therapeutic with minerals     30    1 TABLET DAILY        NAPROXEN PO      Take 220-440 mg by mouth 2 times daily as needed for moderate pain        nystatin 291391 UNIT/GM Powd    MYCOSTATIN    30 g    Apply topically 3 times daily as needed    Rash       OMEPRAZOLE PO      Take 20 mg by mouth every morning        VITAMIN B6 PO      Take 100 mg by mouth daily        * Notice:  This list has 2 medication(s) that are the same as other medications prescribed for you. Read the directions carefully, and ask your doctor or other care provider to review them with you.

## 2018-05-25 NOTE — PROGRESS NOTES
SUBJECTIVE:   Louann Crocker is a 77 year old female who presents for Preventive Visit.    Issues with sore neck and spine care   Are you in the first 12 months of your Medicare coverage?  No    Physical   Annual:     Getting at least 3 servings of Calcium per day::  Yes    Bi-annual eye exam::  Yes    Dental care twice a year::  NO    Sleep apnea or symptoms of sleep apnea::  Daytime drowsiness    Diet::  Other    Frequency of exercise::  2-3 days/week    Duration of exercise::  15-30 minutes    Taking medications regularly::  Yes    Medication side effects::  Other    Additional concerns today::  YES    Ability to successfully perform activities of daily living: transportation requires assistance, housework requires assistance and medication administration requires assistance  Home Safety:  No safety concerns identified  Hearing Impairment: difficulty understanding soft or whispered speech        Fall risk:     click delete button to remove this line now  COGNITIVE SCREEN  1) Repeat 3 items (Banana, Sunrise, Chair)    2) Clock draw: ABNORMAL arrows wrong  3) 3 item recall: Recalls 3 objects  Results: 3 items recalled: COGNITIVE IMPAIRMENT LESS LIKELY    Mini-CogTM Copyright GUILLERMO Tejada. Licensed by the author for use in [facility]; reprinted with permission (randy@[email or web address]). All rights reserved.        Reviewed and updated as needed this visit by clinical staff         Reviewed and updated as needed this visit by Provider        Social History   Substance Use Topics     Smoking status: Former Smoker     Packs/day: 1.00     Years: 20.00     Types: Cigarettes     Quit date: 1/1/1977     Smokeless tobacco: Never Used     Alcohol use Yes      Comment: on occasion       Alcohol Use 5/22/2018   If you drink alcohol do you typically have greater than 3 drinks per day OR greater than 7 drinks per week? No               Today's PHQ-2 Score:   PHQ-2 ( 1999 [employer]) 5/22/2018   Q1: Little interest or pleasure in  doing things 0   Q2: Feeling down, depressed or hopeless 0   PHQ-2 Score 0   Q1: Little interest or pleasure in doing things Not at all   Q2: Feeling down, depressed or hopeless Not at all   PHQ-2 Score 0       Do you feel safe in your environment - Yes    Do you have a Health Care Directive?: No: Advance care planning was reviewed with patient; patient declined at this time.    Current providers sharing in care for this patient include:   Patient Care Team:  Luciano Fonseca MD as PCP - General (Family Practice)    The following health maintenance items are reviewed in Epic and correct as of today:  Health Maintenance   Topic Date Due     ADVANCE DIRECTIVE PLANNING Q5 YRS  09/11/1995     DEXA SCAN SCREENING (SYSTEM ASSIGNED)  09/11/2005     PNEUMOCOCCAL (2 of 2 - PCV13) 10/25/2007     MEDICARE ANNUAL WELLNESS VISIT  08/01/2017     PHQ-9 Q6 MONTHS  11/23/2017     FALL RISK ASSESSMENT  05/23/2018     DEPRESSION ACTION PLAN Q1 YR  05/23/2018     INFLUENZA VACCINE (Season Ended) 09/01/2018     TETANUS IMMUNIZATION (SYSTEM ASSIGNED)  10/13/2019     LIPID SCREEN Q5 YR FEMALE (SYSTEM ASSIGNED)  08/01/2021       BP Readings from Last 3 Encounters:   05/25/18 174/82   09/12/17 148/80   06/07/17 174/84    Wt Readings from Last 3 Encounters:   05/25/18 172 lb 1.6 oz (78.1 kg)   09/12/17 183 lb 4.8 oz (83.1 kg)   05/31/17 183 lb (83 kg)               Current Outpatient Prescriptions   Medication     calcium carbonate-vitamin D 600-400 MG-UNIT CHEW     cholecalciferol (VITAMIN D3) 5000 UNITS CAPS capsule     clonazePAM (KLONOPIN) 0.5 MG tablet     DULoxetine (CYMBALTA) 60 MG EC capsule     estradiol (ESTRACE VAGINAL) 0.1 MG/GM cream     estradiol (ESTRACE) 1 MG tablet     Flaxseed, Linseed, (FLAX SEED OIL) 1000 MG capsule     fluticasone propionate (CUTIVATE) 0.005 % ointment     lamoTRIgine (LAMICTAL) 100 MG tablet     lamoTRIgine (LAMICTAL) 25 MG tablet     MULTI-VITAMIN OR TABS     NAPROXEN PO     nystatin  "(MYCOSTATIN) 552977 UNIT/GM POWD     OMEPRAZOLE PO     Pyridoxine HCl (VITAMIN B6 PO)     No current facility-administered medications for this visit.             Pneumonia Vaccine:Adults age 65+ who received Pneumovax (PPSV23) at 65 years or older: Should be given PCV13 > 1 year after their most recent PPSV23    Review of Systems  CONSTITUTIONAL: NEGATIVE for fever, chills, change in weight  INTEGUMENTARY/SKIN: NEGATIVE for worrisome rashes, moles or lesions  EYES: NEGATIVE for vision changes or irritation  ENT/MOUTH: NEGATIVE for ear, mouth and throat problems  RESP: NEGATIVE for significant cough or SOB  BREAST: NEGATIVE for masses, tenderness or discharge  CV: NEGATIVE for chest pain, palpitations or peripheral edema  GI: NEGATIVE for nausea, abdominal pain, heartburn, or change in bowel habits  : NEGATIVE for frequency, dysuria, or hematuria  MUSCULOSKELETAL: NEGATIVE for significant arthralgias or myalgia  NEURO: NEGATIVE for weakness, dizziness or paresthesias  ENDOCRINE: NEGATIVE for temperature intolerance, skin/hair changes  HEME: NEGATIVE for bleeding problems  PSYCHIATRIC: NEGATIVE for changes in mood or affect    OBJECTIVE:   /78 (BP Location: Left arm, Patient Position: Chair, Cuff Size: Adult Regular)  Pulse 77  Temp 97.7  F (36.5  C) (Oral)  Resp 14  Ht 5' 3\" (1.6 m)  Wt 172 lb 1.6 oz (78.1 kg)  SpO2 98%  BMI 30.49 kg/m2    Physical Exam  GENERAL: healthy, alert and no distress  EYES: Eyes grossly normal to inspection, PERRL and conjunctivae and sclerae normal  HENT: ear canals and TM's normal, nose and mouth without ulcers or lesions  NECK: no adenopathy, no asymmetry, masses, or scars and thyroid normal to palpation  RESP: lungs clear to auscultation - no rales, rhonchi or wheezes  CV: regular rate and rhythm, normal S1 S2, no S3 or S4, no murmur, click or rub, no peripheral edema and peripheral pulses strong  ABDOMEN: soft, nontender, no hepatosplenomegaly, no masses and bowel " "sounds normal  MS: no gross musculoskeletal defects noted, no edema  SKIN: no suspicious lesions or rashes  NEURO: Normal strength and tone, mentation intact and speech normal  PSYCH: mentation appears normal, affect normal/bright    ASSESSMENT / PLAN:   1. Encounter for HCV screening test for low risk patient      2. Medicare annual wellness visit, subsequent  Back and neck issues bother her a lot  Has spinal follow up     - DEPRESSION ACTION PLAN (DAP)  - **Hepatitis C Screen Reflex to RNA FUTURE anytime    3. Urgency incontinence    - OB/GYN REFERRAL    End of Life Planning:  Patient currently has an advanced directive: Yes.  Practitioner is supportive of decision.    COUNSELING:  Reviewed preventive health counseling, as reflected in patient instructions       Regular exercise       Healthy diet/nutrition       Vision screening        Estimated body mass index is 32.47 kg/(m^2) as calculated from the following:    Height as of 9/12/17: 5' 3\" (1.6 m).    Weight as of 9/12/17: 183 lb 4.8 oz (83.1 kg).     reports that she quit smoking about 41 years ago. Her smoking use included Cigarettes. She has a 20.00 pack-year smoking history. She has never used smokeless tobacco.      Appropriate preventive services were discussed with this patient, including applicable screening as appropriate for cardiovascular disease, diabetes, osteopenia/osteoporosis, and glaucoma.  As appropriate for age/gender, discussed screening for colorectal cancer, prostate cancer, breast cancer, and cervical cancer. Checklist reviewing preventive services available has been given to the patient.    Reviewed patients plan of care and provided an AVS. The Basic Care Plan (routine screening as documented in Health Maintenance) for Louann meets the Care Plan requirement. This Care Plan has been established and reviewed with the Patient and spouse.    Counseling Resources:  ATP IV Guidelines  Pooled Cohorts Equation Calculator  Breast Cancer Risk " Calculator  FRAX Risk Assessment  ICSI Preventive Guidelines  Dietary Guidelines for Americans, 2010  [employer]'s MyPlate  ASA Prophylaxis  Lung CA Screening    Luciano Fonseca MD  [facility]  Answers for HPI/ROS submitted by the patient on 5/22/2018   PHQ-2 Score: 0

## 2018-05-26 ENCOUNTER — MYC MEDICAL ADVICE (OUTPATIENT)
Dept: FAMILY MEDICINE | Facility: CLINIC | Age: 78
End: 2018-05-26

## 2018-05-26 LAB — HCV AB SERPL QL IA: NONREACTIVE

## 2018-05-26 RX ORDER — DULOXETINE 40 MG/1
20 CAPSULE, DELAYED RELEASE ORAL 3 TIMES DAILY
COMMUNITY
Start: 2018-05-26 | End: 2021-09-22

## 2018-06-04 ENCOUNTER — OFFICE VISIT (OUTPATIENT)
Dept: NEUROSURGERY | Facility: CLINIC | Age: 78
End: 2018-06-04
Attending: NURSE PRACTITIONER
Payer: MEDICARE

## 2018-06-04 VITALS — BODY MASS INDEX: 30.48 KG/M2 | HEIGHT: 63 IN | WEIGHT: 172 LBS

## 2018-06-04 DIAGNOSIS — M54.16 LUMBAR RADICULAR PAIN: ICD-10-CM

## 2018-06-04 DIAGNOSIS — M54.12 CERVICAL RADICULAR PAIN: Primary | ICD-10-CM

## 2018-06-04 PROCEDURE — 99214 OFFICE O/P EST MOD 30 MIN: CPT | Performed by: NURSE PRACTITIONER

## 2018-06-04 PROCEDURE — G0463 HOSPITAL OUTPT CLINIC VISIT: HCPCS | Performed by: NURSE PRACTITIONER

## 2018-06-04 ASSESSMENT — PAIN SCALES - GENERAL: PAINLEVEL: SEVERE PAIN (7)

## 2018-06-04 NOTE — NURSING NOTE
"Louann Crocker is a 77 year old female who presents for:  Chief Complaint   Patient presents with     Neurologic Problem     Chronic bilateral low back pain with left-sided sciatica, neck pain         Vitals:    Vitals:    06/04/18 1410   Weight: 172 lb (78 kg)   Height: 5' 3\" (1.6 m)       BMI:  Estimated body mass index is 30.47 kg/(m^2) as calculated from the following:    Height as of this encounter: 5' 3\" (1.6 m).    Weight as of this encounter: 172 lb (78 kg).    Pain Score:  Severe Pain (7)      Do you feel safe in your environment?  Yes      Sruthi Vasquez          "

## 2018-06-04 NOTE — PROGRESS NOTES
"Dr. Oneil Serrano  [facility]  Neurosurgery Clinic Visit      CC: neck and low back pain    Primary care Provider: Luciano Fonseca      Reason For Visit:   I was asked by Dr. Fonseca to consult on the patient for cervical and lumbar radicular pain.      HPI: Louann Crocker is a 77 year old female with cervical and radicular pain.  She has been seen for her cervical radicular pain in the past but has been referred back for new lumbar radicular pain. She states that she still has cervical radicular pain. However, she notes that her low back and leg pain has been getting worse. She states that any walking she notes severe numbness and pain to her legs.  She has not had PT or injection therapy recently for her neck or back. She continues to have cervical radicular pain as well.  She states that she can lift but she will \"pay for it later\".  She states that sleeping and laying down makes her pain better.      Pain at its worst 10  Pain right now:  7    Past Medical History:   Diagnosis Date     Anxiety      DDD (degenerative disc disease), cervical      DDD (degenerative disc disease), lumbar      Fibromyalgia      GERD (gastroesophageal reflux disease)      IBS (irritable bowel syndrome)      Major depressive disorder, single episode, severe, without mention of psychotic behavior 2005    ECT x 16       Past Medical History reviewed with patient during visit.    Past Surgical History:   Procedure Laterality Date     ARTHROSCOPY KNEE RT/LT  2007     BACK SURGERY  5/2013    Sacroileac Fusion     BACK SURGERY  10/6/2014    Lumbar Fusion L3-L-4, L-4L-5     C FUSION MC-P JOINT,SINGLE  2010     C FUSION SHOULDER JOINT  1/01    lap bone sput     C ULLOA W/O FACETEC FORAMOT/DSKC 1/2 VRT SEG, LUMBAR  1993     C LAP,BILIARY TRACT,UNLISTED  1966     C LAPAROSCOPY, SURGICAL; W/ VAGINAL HYSTERECTOMY W/WO REMOVAL OVARY(S)/TUBES  1997    Laparoscopy, Vag Hysterectomy     CATARACT IOL, RT/LT  2008     " COLONOSCOPY       ENDOSCOPIC RETROGRADE CHOLANGIOPANCREATOGRAPHY  2005     HC DILATION/CURETTAGE DIAG/THER NON OB  1961    D & C     HC ERCP W SPHINCTEROTOMY  12/09    CBD stones     HC REMOVAL GALLBLADDER  1964    Cholecystectomy     HC REPAIR ROTATOR CUFF,ACUTE  3/90     SURGICAL HISTORY OF -   2005    cervical     SURGICAL HISTORY OF -   2005    lumbar      SURGICAL HISTORY OF -   2008    right knee replacement     VITRECTOMY PARSPLANA WITH 25 GAUGE SYSTEM  5/10/2012    Procedure:VITRECTOMY PARSPLANA WITH 25 GAUGE SYSTEM; LEFT EYE 25 GAUGE VITRECTOMY, MEMBRANE STRIPPING, AIR  FLUID EXCHANGE, GAS 15% C3F8; Surgeon:ANTONIETTA WALTERS; Location:[facility]     Past Surgical History reviewed with patient during visit.    Current Outpatient Prescriptions   Medication     calcium carbonate-vitamin D 600-400 MG-UNIT CHEW     cholecalciferol (VITAMIN D3) 5000 UNITS CAPS capsule     Cyanocobalamin (B-12) 1000 MCG TBCR     DULoxetine HCl 40 MG CPEP     estradiol (ESTRACE VAGINAL) 0.1 MG/GM cream     Flaxseed, Linseed, (FLAX SEED OIL) 1000 MG capsule     fluticasone propionate (CUTIVATE) 0.005 % ointment     MULTI-VITAMIN OR TABS     NAPROXEN PO     nystatin (MYCOSTATIN) 870991 UNIT/GM POWD     Omeprazole (PRILOSEC PO)     OMEPRAZOLE PO     Pregabalin (LYRICA PO)     Pyridoxine HCl (VITAMIN B6 PO)     No current facility-administered medications for this visit.        Allergies   Allergen Reactions     Adhesive Tape      Flagyl [Metronidazole Hcl]      Imidazole antifungals, HIVES & RASH     Nickel      rash       Social History     Social History     Marital status:      Spouse name: Amador     Number of children: 4     Years of education: N/A     Occupational History      Retired     self employed     Social History Main Topics     Smoking status: Former Smoker     Packs/day: 1.00     Years: 20.00     Types: Cigarettes     Quit date: 1/1/1977     Smokeless tobacco: Never Used     Alcohol use Yes      Comment: on occasion  "    Drug use: No     Sexual activity: Yes     Partners: Male     Birth control/ protection: Surgical     Other Topics Concern     Not on file     Social History Narrative       Family History   Problem Relation Age of Onset     Arthritis Mother      fibromyalgia     OSTEOPOROSIS Mother      CANCER Mother      colon     Hypertension Father      CEREBROVASCULAR DISEASE Father      Respiratory Brother      emphysema         Review Of Systems    Review Of Systems  Skin: negative  Eyes: negative  Ears/Nose/Throat: hearing loss  Respiratory: No shortness of breath, dyspnea on exertion, cough, or hemoptysis  Cardiovascular: HLD, HTN  Gastrointestinal: heartburn and gallbladder trouble, IBS  Genitourinary: negative  Musculoskeletal: neck pain, Back pain   Neurologic: numbness or tingling of hands, leg pain   Psychiatric: negative  Hematologic/Lymphatic/Immunologic: negative  Endocrine: diabetes     ROS: 10 point ROS neg other than the symptoms noted above in the HPI.    Vital Signs: Ht 5' 3\" (1.6 m)  Wt 172 lb (78 kg)  BMI 30.47 kg/m2    Examination:  Constitutional:  Alert, well nourished, NAD.  Memory: recent and remote memory intact   HEENT: Normocephalic, atraumatic.   Pulm:  Without shortness of breath   CV:  No pitting edema of BLE.    Neurological:  Awake  Alert  Oriented x 3  Speech clear  Cranial nerves II - XII intact  PERRL  EOMI  Face symmetric  Tongue midline  Motor exam   Shoulder Abduction:  Right:  5/5   Left:  5/5  Biceps:                      Right:  5/5   Left:  5/5  Triceps:                     Right:  5/5   Left:  5/5  Wrist Extensors:       Right:  5/5   Left:  5/5  Wrist Flexors:           Right:  5/5   Left:  5/5  Intrinsics:                   Right:  5/5   Left:  5/5   Hip Flexor:                Right: 5/5  Left:  5/5  Hip Adductor:             Right:  5/5  Left:  5/5  Hip Abductor:             Right:  5/5  Left:  5/5  Gastroc Soleus:        Right:  5/5  Left:  5/5  Tib/Ant:                      " "Right:  5/5  Left:  5/5  EHL:                          Right:  5/5  Left:  5/5   Sensation normal to bilateral upper and lower extremities  Muscle tone to bilateral upper and lower extremities normal   Gait: Able to stand from a seated position. Normal non-antalgic, non-myelopathic gait.  Able to heel/toe walk without loss of balance  Cervical examination reveals restricted and painful range of motion.  Tenderness to palpation of the cervical spine and paraspinous muscles bilaterally.    Lumbar examination reveals tenderness of the spine and paraspinous muscles.  Restricted and painful ROM in all planes. Hip height is symmetrical. Negative SI joint, sciatic notch or greater trochanteric tenderness to palpation bilaterally.  Straight leg raise is negative bilaterally.      Imaging:     Assessment/Plan:    Louann Crocker is a 77 year old female with cervical and radicular pain.  She has been seen for her cervical radicular pain in the past but has been referred back for new lumbar radicular pain. She states that she still has cervical radicular pain. However, she notes that her low back and leg pain has been getting worse. She states that any walking she notes severe numbness and pain to her legs.  She has not had PT or injection therapy recently for her neck or back. She continues to have cervical radicular pain as well.  She states that she can lift but she will \"pay for it later\".  She states that sleeping and laying down makes her pain better.  The pt has restricted and painful ROM to cervical and lumbar spine.  At this time we will obtain updated MRI's. They are back for the summer from [place], [place].     Patient Instructions   1.  Please call [facility] to have cervical and lumbar MRI completed. Call [phone] to schedule your scan.       2.  Please contact the clinic at [phone] to see Flor Ovalle CNP to review results.                Flor Ovalle CNP  [facility] " [address]  [address]  [place] [address]    Tel [phone]  Pager [phone]

## 2018-06-04 NOTE — LETTER
"    6/4/2018         RE: Louann Crocker  [address]  [facility] [address]        Dear Colleague,    Thank you for referring your patient, Louann Crocker, to the [facility]. Please see a copy of my visit note below.    Dr. Oneil Serrano  [facility]  Neurosurgery Clinic Visit      CC: neck and low back pain    Primary care Provider: Luciano Fonseca      Reason For Visit:   I was asked by Dr. Fonseca to consult on the patient for cervical and lumbar radicular pain.      HPI: Louann Crocker is a 77 year old female with cervical and radicular pain.  She has been seen for her cervical radicular pain in the past but has been referred back for new lumbar radicular pain. She states that she still has cervical radicular pain. However, she notes that her low back and leg pain has been getting worse. She states that any walking she notes severe numbness and pain to her legs.  She has not had PT or injection therapy recently for her neck or back. She continues to have cervical radicular pain as well.  She states that she can lift but she will \"pay for it later\".  She states that sleeping and laying down makes her pain better.      Pain at its worst 10  Pain right now:  7    Past Medical History:   Diagnosis Date     Anxiety      DDD (degenerative disc disease), cervical      DDD (degenerative disc disease), lumbar      Fibromyalgia      GERD (gastroesophageal reflux disease)      IBS (irritable bowel syndrome)      Major depressive disorder, single episode, severe, without mention of psychotic behavior 2005    ECT x 16       Past Medical History reviewed with patient during visit.    Past Surgical History:   Procedure Laterality Date     ARTHROSCOPY KNEE RT/LT  2007     BACK SURGERY  5/2013    Sacroileac Fusion     BACK SURGERY  10/6/2014    Lumbar Fusion L3-L-4, L-4L-5     C FUSION MC-P JOINT,SINGLE  2010     C FUSION SHOULDER JOINT  1/01    lap bone " sput     C ULLOA W/O FACETEC FORAMOT/DSKC 1/2 VRT SEG, LUMBAR  1993     C LAP,BILIARY TRACT,UNLISTED  1966     C LAPAROSCOPY, SURGICAL; W/ VAGINAL HYSTERECTOMY W/WO REMOVAL OVARY(S)/TUBES  1997    Laparoscopy, Vag Hysterectomy     CATARACT IOL, RT/LT  2008     COLONOSCOPY       ENDOSCOPIC RETROGRADE CHOLANGIOPANCREATOGRAPHY  2005     HC DILATION/CURETTAGE DIAG/THER NON OB  1961    D & C     HC ERCP W SPHINCTEROTOMY  12/09    CBD stones     HC REMOVAL GALLBLADDER  1964    Cholecystectomy     HC REPAIR ROTATOR CUFF,ACUTE  3/90     SURGICAL HISTORY OF -   2005    cervical     SURGICAL HISTORY OF -   2005    lumbar      SURGICAL HISTORY OF -   2008    right knee replacement     VITRECTOMY PARSPLANA WITH 25 GAUGE SYSTEM  5/10/2012    Procedure:VITRECTOMY PARSPLANA WITH 25 GAUGE SYSTEM; LEFT EYE 25 GAUGE VITRECTOMY, MEMBRANE STRIPPING, AIR  FLUID EXCHANGE, GAS 15% C3F8; Surgeon:ANTONIETTA WALTERS; Location:[facility]     Past Surgical History reviewed with patient during visit.    Current Outpatient Prescriptions   Medication     calcium carbonate-vitamin D 600-400 MG-UNIT CHEW     cholecalciferol (VITAMIN D3) 5000 UNITS CAPS capsule     Cyanocobalamin (B-12) 1000 MCG TBCR     DULoxetine HCl 40 MG CPEP     estradiol (ESTRACE VAGINAL) 0.1 MG/GM cream     Flaxseed, Linseed, (FLAX SEED OIL) 1000 MG capsule     fluticasone propionate (CUTIVATE) 0.005 % ointment     MULTI-VITAMIN OR TABS     NAPROXEN PO     nystatin (MYCOSTATIN) 255379 UNIT/GM POWD     Omeprazole (PRILOSEC PO)     OMEPRAZOLE PO     Pregabalin (LYRICA PO)     Pyridoxine HCl (VITAMIN B6 PO)     No current facility-administered medications for this visit.        Allergies   Allergen Reactions     Adhesive Tape      Flagyl [Metronidazole Hcl]      Imidazole antifungals, HIVES & RASH     Nickel      rash       Social History     Social History     Marital status:      Spouse name: Amador     Number of children: 4     Years of education: N/A     Occupational History  "     Retired     self employed     Social History Main Topics     Smoking status: Former Smoker     Packs/day: 1.00     Years: 20.00     Types: Cigarettes     Quit date: 1/1/1977     Smokeless tobacco: Never Used     Alcohol use Yes      Comment: on occasion     Drug use: No     Sexual activity: Yes     Partners: Male     Birth control/ protection: Surgical     Other Topics Concern     Not on file     Social History Narrative       Family History   Problem Relation Age of Onset     Arthritis Mother      fibromyalgia     OSTEOPOROSIS Mother      CANCER Mother      colon     Hypertension Father      CEREBROVASCULAR DISEASE Father      Respiratory Brother      emphysema         Review Of Systems    Review Of Systems  Skin: negative  Eyes: negative  Ears/Nose/Throat: hearing loss  Respiratory: No shortness of breath, dyspnea on exertion, cough, or hemoptysis  Cardiovascular: HLD, HTN  Gastrointestinal: heartburn and gallbladder trouble, IBS  Genitourinary: negative  Musculoskeletal: neck pain, Back pain   Neurologic: numbness or tingling of hands, leg pain   Psychiatric: negative  Hematologic/Lymphatic/Immunologic: negative  Endocrine: diabetes     ROS: 10 point ROS neg other than the symptoms noted above in the HPI.    Vital Signs: Ht 5' 3\" (1.6 m)  Wt 172 lb (78 kg)  BMI 30.47 kg/m2    Examination:  Constitutional:  Alert, well nourished, NAD.  Memory: recent and remote memory intact   HEENT: Normocephalic, atraumatic.   Pulm:  Without shortness of breath   CV:  No pitting edema of BLE.    Neurological:  Awake  Alert  Oriented x 3  Speech clear  Cranial nerves II - XII intact  PERRL  EOMI  Face symmetric  Tongue midline  Motor exam   Shoulder Abduction:  Right:  5/5   Left:  5/5  Biceps:                      Right:  5/5   Left:  5/5  Triceps:                     Right:  5/5   Left:  5/5  Wrist Extensors:       Right:  5/5   Left:  5/5  Wrist Flexors:           Right:  5/5   Left:  5/5  Intrinsics:                   " "Right:  5/5   Left:  5/5   Hip Flexor:                Right: 5/5  Left:  5/5  Hip Adductor:             Right:  5/5  Left:  5/5  Hip Abductor:             Right:  5/5  Left:  5/5  Gastroc Soleus:        Right:  5/5  Left:  5/5  Tib/Ant:                      Right:  5/5  Left:  5/5  EHL:                          Right:  5/5  Left:  5/5   Sensation normal to bilateral upper and lower extremities  Muscle tone to bilateral upper and lower extremities normal   Gait: Able to stand from a seated position. Normal non-antalgic, non-myelopathic gait.  Able to heel/toe walk without loss of balance  Cervical examination reveals restricted and painful range of motion.  Tenderness to palpation of the cervical spine and paraspinous muscles bilaterally.    Lumbar examination reveals tenderness of the spine and paraspinous muscles.  Restricted and painful ROM in all planes. Hip height is symmetrical. Negative SI joint, sciatic notch or greater trochanteric tenderness to palpation bilaterally.  Straight leg raise is negative bilaterally.      Imaging:     Assessment/Plan:    Louann Crocker is a 77 year old female with cervical and radicular pain.  She has been seen for her cervical radicular pain in the past but has been referred back for new lumbar radicular pain. She states that she still has cervical radicular pain. However, she notes that her low back and leg pain has been getting worse. She states that any walking she notes severe numbness and pain to her legs.  She has not had PT or injection therapy recently for her neck or back. She continues to have cervical radicular pain as well.  She states that she can lift but she will \"pay for it later\".  She states that sleeping and laying down makes her pain better.  The pt has restricted and painful ROM to cervical and lumbar spine.  At this time we will obtain updated MRI's. They are back for the summer from [place], [place].     Patient Instructions   1.  Please call [facility] to have cervical and lumbar MRI completed. Call [phone] to schedule your scan.       2.  Please contact the clinic at [phone] to see Flor Ovalle CNP to review results.                Flor Ovalle CNP  [facility]  [address] [address]    Tel [phone]  Pager [phone]      Again, thank you for allowing me to participate in the care of your patient.        Sincerely,        SANTOS Hernández CNP

## 2018-06-04 NOTE — PATIENT INSTRUCTIONS
1.  Please call [facility] to have cervical and lumbar MRI completed. Call [phone] to schedule your scan.       2.  Please contact the clinic at [phone] to see Flor Ovalle CNP to review results.

## 2018-06-08 ENCOUNTER — HOSPITAL ENCOUNTER (OUTPATIENT)
Dept: MRI IMAGING | Facility: CLINIC | Age: 78
End: 2018-06-08
Attending: NURSE PRACTITIONER
Payer: MEDICARE

## 2018-06-08 ENCOUNTER — HOSPITAL ENCOUNTER (OUTPATIENT)
Dept: MRI IMAGING | Facility: CLINIC | Age: 78
Discharge: HOME | End: 2018-06-08
Attending: NURSE PRACTITIONER | Admitting: NURSE PRACTITIONER
Payer: MEDICARE

## 2018-06-08 ENCOUNTER — TELEPHONE (OUTPATIENT)
Dept: NEUROSURGERY | Facility: CLINIC | Age: 78
End: 2018-06-08

## 2018-06-08 DIAGNOSIS — M54.16 LUMBAR RADICULAR PAIN: ICD-10-CM

## 2018-06-08 DIAGNOSIS — M54.12 CERVICAL RADICULAR PAIN: ICD-10-CM

## 2018-06-08 PROCEDURE — 72158 MRI LUMBAR SPINE W/O & W/DYE: CPT

## 2018-06-08 PROCEDURE — A9585 GADOBUTROL INJECTION: HCPCS | Performed by: NURSE PRACTITIONER

## 2018-06-08 PROCEDURE — 25000128 H RX IP 250 OP 636: Performed by: NURSE PRACTITIONER

## 2018-06-08 PROCEDURE — 72141 MRI NECK SPINE W/O DYE: CPT

## 2018-06-08 RX ORDER — GADOBUTROL 604.72 MG/ML
7.5 INJECTION INTRAVENOUS ONCE
Status: DISCONTINUED | OUTPATIENT
Start: 2018-06-08 | End: 2018-06-08

## 2018-06-08 RX ORDER — GADOBUTROL 604.72 MG/ML
7.5 INJECTION INTRAVENOUS ONCE
Status: COMPLETED | OUTPATIENT
Start: 2018-06-08 | End: 2018-06-08

## 2018-06-08 RX ADMIN — GADOBUTROL 7.5 ML: 604.72 INJECTION INTRAVENOUS at 10:56

## 2018-06-08 NOTE — TELEPHONE ENCOUNTER
Cervical MRI shows fusion intact with very small disc protrusion at C3-4    Lumbar MRI shows fusion intact with degeneration.       Can try injections for both areas and PT.  LM For call back on both numbers.

## 2018-06-12 ENCOUNTER — TRANSFERRED RECORDS (OUTPATIENT)
Dept: HEALTH INFORMATION MANAGEMENT | Facility: CLINIC | Age: 78
End: 2018-06-12

## 2018-06-15 ENCOUNTER — TELEPHONE (OUTPATIENT)
Dept: PALLIATIVE MEDICINE | Facility: CLINIC | Age: 78
End: 2018-06-15

## 2018-06-15 ENCOUNTER — MYC MEDICAL ADVICE (OUTPATIENT)
Dept: FAMILY MEDICINE | Facility: CLINIC | Age: 78
End: 2018-06-15

## 2018-06-15 DIAGNOSIS — Z80.0 FAMILY HISTORY OF COLON CANCER: Primary | ICD-10-CM

## 2018-06-15 NOTE — TELEPHONE ENCOUNTER
Pre-screening Questions for Radiology Injections:    Injection to be done at which interventional clinic site? [facility], instruct patient to arrive 30 minutes before the scheduled appointment time.     Procedure ordered by Flor Ovalle    Procedure ordered? Cervical and Lumbar Epidural Steroid Injection    What insurance would patient like us to bill for this procedure? MEDICARE      Worker's comp or MVA (motor vehicle accident) -Any injection DO NOT SCHEDULE and route to Josselyn Cunha.      [employer] - For SI joint injections, DO NOT SCHEDULE and route Komal Bess. [employer] NO PA REQUIRED EFFECTIVE 11/1/2017      [employer]- MBB's must be scheduled at LEAST two weeks apart      [employer] - Any injection besides hip/shoulder/knee joint DO NOT SCHEDULE and route to Komal Bess. She will obtain PA and call pt back to schedule procedure or notify pt of denial.        [employer]-Route to Komal for review    Any chance of pregnancy? Not Applicable   If YES, do NOT schedule and route to RN pool    Is an  needed? No     Patient has a drive home? (mandatory) YES:     Is patient taking any blood thinners (plavix, coumadin, jantoven, warfarin, heparin, pradaxa or dabigatran )? No   If hold needed, do NOT schedule, route to RN pool     Is patient taking any aspirin products? No     If more than 325mg/day do NOT schedule; route to RN pool     For CERVICAL procedures, hold all aspirin products for 6 days.      Does the patient have a bleeding or clotting disorder? No     If YES, okay to schedule AND route to RN nurse pool    **For any patients with platelet count <100, must be forwarded to provider**    Is patient diabetic?  No  If YES, have them bring their glucometer.    Does patient have an active infection or treated for one within the past week? No     Is patient currently taking any antibiotics?  No     For patients on chronic, preventative, or  prophylactic antibiotics, procedures may be scheduled.     For patients on antibiotics for active or recent infection:    Crista Lozoya Burton, Snitzer-antibiotic course must have been completed for 4 days    Is patient currently taking any steroid medications? (i.e. Prednisone, Medrol)  No     For patients on steroid medications:    Crista Lozoya Burton, Snitzer-steroid course must have been completed for 4 days    Reviewed with patient:  If you are started on any steroids or antibiotics between now and your appointment, you must contact us because it may affect our ability to perform your procedure.  Yes    Is patient actively being treated for cancer or immunocompromised? No  If YES, do NOT schedule and route to RN pool     Are you able to get on and off an exam table with minimal or no assistance? Yes  If NO, do NOT schedule and route to RN pool    Are you able to roll over and lay on your stomach with minimal or no assistance? Yes  If NO, do NOT schedule and route to RN pool     Any allergies to contrast dye, iodine, shellfish, or numbing and steroid medications? No  If YES, route to RN pool AND add allergy information to appointment notes    Allergies: Adhesive tape; Flagyl [metronidazole hcl]; and Nickel      Has the patient had a flu shot or any other vaccinations within 7 days before or after the procedure.  No     Does patient have an MRI/CT?  YES: MRI  (SI joint, hip injections, lumbar sympathetic blocks, and stellate ganglion blocks do not require an MRI)    Was the MRI done w/in the last 3 years?  Yes    Was MRI done at [place]? Yes      If not, where was it done? N/A       If MRI was not done at [place], [facility] do NOT schedule and route to nursing.  If pt has an imaging disc, the injection may be scheduled but pt has to bring disc to appt. If they show up w/out disc the injection cannot be done    Reminders (please tell patient if applicable):       Instructed  pt to arrive 30 minutes early for IV start if this is for a cervical procedure, ALL sympathetic (stellate ganglion, hypogastric, or lumbar sympathetic block) and all sedation procedures (RFA, spinal cord stimulation trials).  Not Applicable   -IVs are not routinely placed for Dr. Baker cervical cases   -Dr. Aquino: IVs for cervical ESIs and cervical TBDs (not CMBBs/facet inj)      If NPO for sedation, informed patient that it is okay to take medications with sips of water (except if they are to hold blood thinners).  Not Applicable   *DO take blood pressure medication if it is prescribed*      If this is for a cervical ANTHONY, informed patient that aspirin needs to be held for 6 days.   Not Applicable      For all patients not having spinal cord stimulator (SCS) trials or radiofrequency ablations (RFAs), informed patient:    IV sedation is not provided for this procedure.  If you feel that an oral anti-anxiety medication is needed, you can discuss this further with your referring provider or primary care provider.  The Pain Clinic provider will discuss specifics of what the procedure includes at your appointment.  Most procedures last 10-20 minutes.  We use numbing medications to help with any discomfort during the procedure.  Not Applicable      Do not schedule procedures requiring IV placement in the first appointment of the day or first appointment after lunch. Do NOT schedule at 0745, 0815 or 1245.       For patients 85 or older we recommend having an adult stay w/ them for the remainder of the day.       Does the patient have any questions?  NO  Josselyn Cunha  [facility]

## 2018-06-20 NOTE — PROGRESS NOTES
[facility] - Procedure Note    Date of Service: 6/21/2018  Procedure performed: Right sacroiliac joint injection  Diagnosis: Sacroiliac joint pain  : Cassia Baker MD & Reva Alejandre MD (pain fellow)   Anesthesia: none    Indications: Louann Crocker is a 77 year old female who is seen at the request of Flor Ovalle CNP for a sacroiliac joint injection. The patient describes pain in the right low back/buttock with radiation into the posterior thigh proximal to the knee and into the right groin. Exam shows tenderness with compression of the right SI joint. The patient reports minimal improvement with conservative treatment, including medications. She has a hx of prior L3-S1 fusion.    Lumbar MRI was done on 6/08/2018 which showed   FINDINGS: The report is dictated assuming five lumbar-type vertebral  bodies, and radiographic correlation may be necessary.  The distal  spinal cord and cauda equina appear normal.  Plate and pedicle screw  posterior fusion from L3 through S1. L5-S1 fusion was present on the  prior study. L3-4 and L4-5 fusions are new since the previous study.     T12-L1:   Normal disc, facet joints, spinal canal and neural foramina.       L1-L2:   Normal disc, facet joints, spinal canal and neural foramina.       L2-L3:  Moderate narrowing of the interspace. Prominent annular bulge.  Moderate bilateral facet joint disease. Moderate central spinal  stenosis. No significant foraminal stenosis. The degenerative change  has progressed in the interval.     L3-L4:  Advanced narrowing of the interspace. No central or lateral  stenosis. Degenerative change has progressed in the interval and the  fusion is new posteriorly.     L4-L5:  Interval fusion as noted above. No central or lateral  stenosis.     L5-S1:  Prior fusion again identified. Artificial disc at the  lumbosacral level. No central or lateral stenosis.     No pathologic enhancement with gadolinium.   Paraspinous soft  tissues:   Normal.     Bone marrow:  No malignant or destructive changes.       IMPRESSION:    1. Posterior fusion as described L3-S1 and artificial disc at the  lumbosacral level. The fusions at L3-4 and L4-5 are new since prior  study.  2. Multilevel degenerative change as described with some progression  in the interval, especially at L2-3.    Allergies:      Allergies   Allergen Reactions     Adhesive Tape      Flagyl [Metronidazole Hcl]      Imidazole antifungals, HIVES & RASH     Nickel      rash        Vitals:  /84  Pulse 77  SpO2 95%    Options/alternatives, benefits and risks were discussed with the patient including bleeding, infection, tissue trauma, exposure to radiation, reaction to medications including seizure, nerve injury, weakness, and numbness.  Questions were answered to her satisfaction and she agrees to proceed. Voluntary informed consent was obtained and signed.     Procedure:  After getting informed consent, patient was brought into the procedure suite and was placed in a prone position on the procedure table.   A Pause for the Cause was performed.  Patient was prepped and draped in sterile fashion.     After identifying the right SI joint, the C-arm was rotated to a obliquely to obtain the best view of the inferior angle of the joint.  A total of 2 ml of Lidocaine 1%  was used to anesthetize the skin at a skin entry site coaxial with the fluoroscopy beam at this location.  A 22 gauge 5 inch needle was advanced under intermittent fluoroscopy until it was felt to enter the SI joint.    A total of 3.5 ml of Omnipaque-300 was injected, confirming appropriate position, with spread into the intraarticular space, with no intravascular uptake noted.  6.5 ml of Omnipaque-300 was wasted. Location was verified in lateral view.    1 ml of 1% lidocaine, 1 ml of 0.5% bupivacaine with 40 mg of kenalog was injected.  The needle was removed. Hemostasis was achieved, the area was cleaned, and bandaids  were placed when appropriate.  The patient tolerated the procedure well, and was taken to the recovery room.    Images were saved to PACS.    Pre-procedure pain score: 7/10  Post-procedure pain score: 0/10  Following today's procedure, the patient was advised to contact the [facility] for any of the following:   Fever, chills, or night sweats   New onset of pain, numbness, or weakness   Any questions/concerns regarding the procedure    -f/u with the referring provider      Cassia Baker MD   [facility] [place] [facility]

## 2018-06-21 ENCOUNTER — RADIOLOGY INJECTION OFFICE VISIT (OUTPATIENT)
Dept: PALLIATIVE MEDICINE | Facility: CLINIC | Age: 78
End: 2018-06-21
Attending: NURSE PRACTITIONER
Payer: MEDICARE

## 2018-06-21 ENCOUNTER — RADIANT APPOINTMENT (OUTPATIENT)
Dept: GENERAL RADIOLOGY | Facility: CLINIC | Age: 78
End: 2018-06-21
Attending: ANESTHESIOLOGY
Payer: MEDICARE

## 2018-06-21 VITALS — SYSTOLIC BLOOD PRESSURE: 179 MMHG | OXYGEN SATURATION: 95 % | HEART RATE: 79 BPM | DIASTOLIC BLOOD PRESSURE: 79 MMHG

## 2018-06-21 DIAGNOSIS — M53.3 SI (SACROILIAC) JOINT DYSFUNCTION: ICD-10-CM

## 2018-06-21 DIAGNOSIS — M46.1 SACROILIITIS (H): Primary | ICD-10-CM

## 2018-06-21 DIAGNOSIS — M54.16 LUMBAR RADICULOPATHY: ICD-10-CM

## 2018-06-21 PROCEDURE — 27096 INJECT SACROILIAC JOINT: CPT | Mod: RT | Performed by: ANESTHESIOLOGY

## 2018-06-21 NOTE — PATIENT INSTRUCTIONS
[facility] Procedure Discharge Instructions    Today you saw:   Dr. Cassia Baker         Your procedure:   Right sacroiliac joint injection      Medications used:  Lidocaine (anesthetic) Bupivacaine (anesthetic) Kenalog (steroid)  Omnipaque (contrast)                Be cautious when walking as numbness and/or weakness in the legs may occur up to 6-8 hours after the procedure due to effect of the local anesthetic    Do not drive for 6 hours. The effect of the local anesthetic could slow your reflexes.     Avoid strenuous activity for the first 24 hours. You may resume your regular activities after that.     You may shower, however avoid swimming, tub baths or hot tubs for 24 hours following your procedure    You may have a mild to moderate increase in pain for several days following the injection.      You may use ice packs for 10-15 minutes, 3 to 4 times a day at the injection site for comfort    Do not use heat to painful areas for 6 to 8 hours. This will give the local anesthetic time to wear off and prevent you from accidentally burning your skin.    You may use anti-inflammatory medications (such as Ibuprofen/Advil or Aleve) or Tylenol for pain control if necessary    With diabetes, check your blood sugar more frequently than usual as your blood sugar may be higher than normal for 10-14 days following a steroid injection. Contact your doctor who manages your diabetes if your blood sugar is higher than usual    It may take up to 14 days for the steroid medication to start working although you may feel the effect as early as a few days after the procedure.     Follow up with your referring provider in 2-3 weeks      If you experience any of the following, call the pain center line during work hours at [phone] or on-call physician after hours at [phone]:  -Fever over 100 degree F  -Swelling, bleeding, redness, drainage, warmth at the injection site  -Progressive weakness or numbness in your legs  or arms  -Loss of bowel or bladder function  -Unusual headache that is not relieved by Tylenol or your regular headache medication  -Unusual new onset of pain that is not improving    Phone #s:  Nurse triage line for general questions: [phone]

## 2018-06-21 NOTE — NURSING NOTE
Discharge Information    IV Discontiued Time:  NA    Amount of Fluid Infused:  NA    Discharge Criteria = When patient returns to baseline or as per MD order    Consciousness:  Pt is fully awake    Circulation:  BP +/- 20% of pre-procedure level    Respiration:  Patient is able to breathe deeply    O2 Sat:  Patient is able to maintain O2 Sat >92% on room air    Activity:  Moves 4 extremities on command    Ambulation:  Patient is able to stand and walk or stand and pivot into wheelchair    Dressing:  Clean/dry or No Dressing    Notes:   Discharge instructions and AVS given to patient    Patient meets criteria for discharge?  YES    Admitted to PCU?  No    Responsible adult present to accompany patient home?  Yes    Signature/Title:    Annmarie Huertas RN Care Coordinator  [facility]

## 2018-06-21 NOTE — MR AVS SNAPSHOT
After Visit Summary   6/21/2018    Louann Crocker    MRN: [id number]           Patient Information     Date Of Birth          1940        Visit Information        Provider Department      6/21/2018 9:15 AM Cassia Baker MD [facility]        Care Instructions    [facility] Procedure Discharge Instructions    Today you saw:   Dr. Cassia Baker         Your procedure:   Right sacroiliac joint injection      Medications used:  Lidocaine (anesthetic) Bupivacaine (anesthetic) Kenalog (steroid)  Omnipaque (contrast)                Be cautious when walking as numbness and/or weakness in the legs may occur up to 6-8 hours after the procedure due to effect of the local anesthetic    Do not drive for 6 hours. The effect of the local anesthetic could slow your reflexes.     Avoid strenuous activity for the first 24 hours. You may resume your regular activities after that.     You may shower, however avoid swimming, tub baths or hot tubs for 24 hours following your procedure    You may have a mild to moderate increase in pain for several days following the injection.      You may use ice packs for 10-15 minutes, 3 to 4 times a day at the injection site for comfort    Do not use heat to painful areas for 6 to 8 hours. This will give the local anesthetic time to wear off and prevent you from accidentally burning your skin.    You may use anti-inflammatory medications (such as Ibuprofen/Advil or Aleve) or Tylenol for pain control if necessary    With diabetes, check your blood sugar more frequently than usual as your blood sugar may be higher than normal for 10-14 days following a steroid injection. Contact your doctor who manages your diabetes if your blood sugar is higher than usual    It may take up to 14 days for the steroid medication to start working although you may feel the effect as early as a few days after the procedure.     Follow up with your referring provider in 2-3  weeks      If you experience any of the following, call the pain center line during work hours at [phone] or on-call physician after hours at [phone]:  -Fever over 100 degree F  -Swelling, bleeding, redness, drainage, warmth at the injection site  -Progressive weakness or numbness in your legs or arms  -Loss of bowel or bladder function  -Unusual headache that is not relieved by Tylenol or your regular headache medication  -Unusual new onset of pain that is not improving    Phone #s:  Nurse triage line for general questions: [phone]          Follow-ups after your visit        Your next 10 appointments already scheduled     Jul 02, 2018  2:45 PM CDT   Office Visit with Raudel Kan MD   [facility]    [address] [place] [address] [phone]           Bring a current list of meds and any records pertaining to this visit. For Physicals, please bring immunization records and any forms needing to be filled out. Please arrive 10 minutes early to complete paperwork.            Jul 05, 2018  9:15 AM CDT   Radiology Injections with Cassia Baker MD   [facility] ([facility])    [address]  [address]  [facility] [address] [phone]              Who to contact     If you have questions or need follow up information about today's clinic visit or your schedule please contact [facility] directly at [phone].  Normal or non-critical lab and imaging results will be communicated to you by MyChart, letter or phone within 4 business days after the clinic has received the results. If you do not hear from us within 7 days, please contact the clinic through MyChart or phone. If you have a critical or abnormal lab result, we will notify you by phone as soon as possible.  Submit refill requests through [employer] or call your pharmacy and they will forward the refill request to us.  Please allow 3 business days for your refill to be completed.          Additional Information About Your Visit        MyChart Information     [employer] gives you secure access to your electronic health record. If you see a primary care provider, you can also send messages to your care team and make appointments. If you have questions, please call your primary care clinic.  If you do not have a primary care provider, please call [phone] and they will assist you.        Care EveryWhere ID     This is your Care EveryWhere ID. This could be used by other organizations to access your [facility] medical records  [id number]        Your Vitals Were     Pulse Pulse Oximetry                77 95%           Blood Pressure from Last 3 Encounters:   06/21/18 154/84   05/25/18 138/78   09/12/17 148/80    Weight from Last 3 Encounters:   06/04/18 78 kg (172 lb)   05/25/18 78.1 kg (172 lb 1.6 oz)   09/12/17 83.1 kg (183 lb 4.8 oz)              Today, you had the following     No orders found for display       Primary Care Provider Office Phone # Fax #    Luciano Dylan Fonseca [phone] [address] [facility] [address]        Equal Access to Services     DANI NULL AH: Hadii nicole condeo Sovivianaali, waaxda luqadaha, qaybta kaalmada adeegyada, philipp dotson. So [facility] [phone].    ATENCIÓN: Si habla español, tiene a adam disposición servicios gratuitos de asistencia lingüística. Lorenaame al [phone].    We comply with applicable federal civil rights laws and [place] laws. We do not discriminate on the basis of race, color, national origin, age, disability, sex, sexual orientation, or gender identity.            Thank you!     Thank you for choosing [facility]  for your care. Our goal is always to provide you with excellent care. Hearing back from our patients is one way we can continue to improve our services. Please take a few minutes to complete the  written survey that you may receive in the mail after your visit with us. Thank you!             Your Updated Medication List - Protect others around you: Learn how to safely use, store and throw away your medicines at www.disposemymeds.org.          This list is accurate as of 6/21/18  9:19 AM.  Always use your most recent med list.                   Brand Name Dispense Instructions for use Diagnosis    B-12 1000 MCG Tbcr     100 tablet    Take 1,000 mcg by mouth daily        calcium carbonate-vitamin D 600-400 MG-UNIT Chew      Take 1 chew tab by mouth        cholecalciferol 5000 units Caps capsule    vitamin D3     Take 5,000 Units by mouth daily        DULoxetine HCl 40 MG Cpep      Take 40 mg by mouth daily        estradiol 0.1 MG/GM cream    ESTRACE VAGINAL    42.5 g    Place 2 g vaginally twice a week    Vaginal atrophy       flax seed oil 1000 MG capsule      Take 1 capsule by mouth daily        fluticasone propionate 0.005 % ointment    CUTIVATE    1 Tube    Apply 1 g topically 2 times daily    Rash       LYRICA PO      Take 25 mg by mouth daily        Multi-vitamin Tabs tablet   Generic drug:  multivitamin, therapeutic with minerals     30    1 TABLET DAILY        NAPROXEN PO      Take 220-440 mg by mouth 2 times daily as needed for moderate pain        nystatin 810615 UNIT/GM Powd    MYCOSTATIN    30 g    Apply topically 3 times daily as needed    Rash       OMEPRAZOLE PO      Take 20 mg by mouth every morning        PRILOSEC PO      Take 20 mg by mouth every morning        VITAMIN B6 PO      Take 100 mg by mouth daily

## 2018-07-02 ENCOUNTER — OFFICE VISIT (OUTPATIENT)
Dept: OBGYN | Facility: CLINIC | Age: 78
End: 2018-07-02
Payer: MEDICARE

## 2018-07-02 VITALS — DIASTOLIC BLOOD PRESSURE: 78 MMHG | SYSTOLIC BLOOD PRESSURE: 136 MMHG | WEIGHT: 178 LBS | BODY MASS INDEX: 31.53 KG/M2

## 2018-07-02 DIAGNOSIS — N39.41 URGE INCONTINENCE OF URINE: ICD-10-CM

## 2018-07-02 DIAGNOSIS — N32.81 URGENCY-FREQUENCY SYNDROME: Primary | ICD-10-CM

## 2018-07-02 LAB
ALBUMIN UR-MCNC: NEGATIVE MG/DL
APPEARANCE UR: CLEAR
BILIRUB UR QL STRIP: NEGATIVE
COLOR UR AUTO: YELLOW
GLUCOSE UR STRIP-MCNC: NEGATIVE MG/DL
HGB UR QL STRIP: NEGATIVE
KETONES UR STRIP-MCNC: NEGATIVE MG/DL
LEUKOCYTE ESTERASE UR QL STRIP: NEGATIVE
NITRATE UR QL: NEGATIVE
PH UR STRIP: 5 PH (ref 5–7)
SOURCE: NORMAL
SP GR UR STRIP: 1.02 (ref 1–1.03)
UROBILINOGEN UR STRIP-ACNC: 0.2 EU/DL (ref 0.2–1)

## 2018-07-02 PROCEDURE — 99203 OFFICE O/P NEW LOW 30 MIN: CPT | Performed by: OBSTETRICS & GYNECOLOGY

## 2018-07-02 PROCEDURE — 81003 URINALYSIS AUTO W/O SCOPE: CPT | Performed by: OBSTETRICS & GYNECOLOGY

## 2018-07-02 RX ORDER — TOLTERODINE 4 MG/1
4 CAPSULE, EXTENDED RELEASE ORAL DAILY
Qty: 30 CAPSULE | Refills: 1 | Status: SHIPPED | OUTPATIENT
Start: 2018-07-02 | End: 2018-08-02

## 2018-07-02 NOTE — NURSING NOTE
"Chief Complaint   Patient presents with     Urinary Problem       Initial /78  Wt 178 lb (80.7 kg)  BMI 31.53 kg/m2 Estimated body mass index is 31.53 kg/(m^2) as calculated from the following:    Height as of 18: 5' 3\" (1.6 m).    Weight as of this encounter: 178 lb (80.7 kg).  BP completed using cuff size: regular        The following HM Due: NONE      The following patient reported/Care Every where data was sent to:  P ABSTRACT QUALITY INITIATIVES [[id number]]        Mirian Malloy, [facility]                 "

## 2018-07-02 NOTE — MR AVS SNAPSHOT
After Visit Summary   7/2/2018    Louann Crocker    MRN: [id number]           Patient Information     Date Of Birth          1940        Visit Information        Provider Department      7/2/2018 2:45 PM Raudel Kan MD [facility]        Today's Diagnoses     Urgency-frequency syndrome    -  1    Urge incontinence of urine          Care Instructions    You can reach your [facility] Care Team any time of the day by calling [phone]. This number will put you in touch with the 24 hour nurse line if the clinic is closed.    To contact your OB/GYN Station Coordinator/Surgery Scheduler please call [phone]. This is a direct number for your care team between 8 a.m. and 4 p.m. Monday through Friday.    [facility] is open for your convenience:  Monday through Friday 8 a.m. to 6 p.m.  Closed weekends and all major holidays.            Follow-ups after your visit        Follow-up notes from your care team     Return in about 1 month (around 8/2/2018).      Your next 10 appointments already scheduled     Jul 09, 2018 12:10 PM CDT   (Arrive by 11:55 AM)   ANNABELLA Spine with Amador Hodges, PT   [facility] ([facility])    [address]  [facility] [address] [phone]            Jul 16, 2018  9:30 AM CDT   ANNABELLA Spine with Abi Owens, PT   [facility] ([facility])    [address]  [facility] [address] [phone]            Jul 23, 2018  9:30 AM CDT   ANNABELLA Spine with Abi Owens, PT   [facility] ([facility])    [address]  [facility] [id number] [phone]            Aug 13, 2018   Procedure with Deng Monzon MD   [facility] ([facility])    [address] [facility] [address] [phone]            [facility] is located at [address] [place]              Who to contact     If you have questions or need follow up information about today's clinic visit or your schedule please contact [facility] directly at [phone].  Normal or non-critical lab and imaging results will be communicated to you by MyChart, letter or phone within 4 business days after the clinic has received the results. If you do not hear from us within 7 days, please contact the clinic through [email or web address] or phone. If you have a critical or abnormal lab result, we will notify you by phone as soon as possible.  Submit refill requests through [employer] or call your pharmacy and they will forward the refill request to us. Please allow 3 business days for your refill to be completed.          Additional Information About Your Visit        [email or web address] Information     [employer] gives you secure access to your electronic health record. If you see a primary care provider, you can also send messages to your care team and make appointments. If you have questions, please call your primary care clinic.  If you do not have a primary care provider, please call [phone] and they will assist you.        Care EveryWhere ID     This is your Care EveryWhere ID. This could be used by other organizations to access your [facility] medical records  [id number]        Your Vitals Were     BMI (Body Mass Index)                   31.53 kg/m2            Blood Pressure from Last 3 Encounters:   07/05/18 174/87   07/02/18 136/78   06/21/18 179/79    Weight from Last 3 Encounters:   07/02/18 178 lb (80.7 kg)   06/04/18 172 lb (78 kg)   05/25/18 172 lb 1.6 oz (78.1 kg)              We Performed the Following     UA reflex to Microscopic and Culture          Today's Medication Changes          These changes are accurate as of 7/2/18 11:59 PM.  If you have any questions, ask your nurse or doctor.               Start taking these medicines.         Dose/Directions    tolterodine 4 MG 24 hr capsule   Commonly known as:  DETROL LA   Used for:  Urgency-frequency syndrome, Urge incontinence of urine   Started by:  Raudel Kan MD        Dose:  4 mg   Take 1 capsule (4 mg) by mouth daily   Quantity:  30 capsule   Refills:  1            Where to get your medicines      These medications were sent to [facility] - [place], [place] - [address]  [address], [facility] [address]     Phone:  [phone]     tolterodine 4 MG 24 hr capsule                Primary Care Provider Office Phone # Fax #    Luciano Dylan Fonseca [phone] [address]  [facility] [address]        Equal Access to Services     DANI NULL : Hadii nicole alva hadasho Soomaali, waaxda luqadaha, qaybta kaalmada adeegyada, philipp hayes haypavel martinez . So [facility] [phone].    ATENCIÓN: Si habla español, tiene a adam disposición servicios gratuitos de asistencia lingüística. [facility] [phone].    We comply with applicable federal civil rights laws and [place] laws. We do not discriminate on the basis of race, color, national origin, age, disability, sex, sexual orientation, or gender identity.            Thank you!     Thank you for choosing [facility]  for your care. Our goal is always to provide you with excellent care. Hearing back from our patients is one way we can continue to improve our services. Please take a few minutes to complete the written survey that you may receive in the mail after your visit with us. Thank you!             Your Updated Medication List - Protect others around you: Learn how to safely use, store and throw away your medicines at www.disposemymeds.org.          This list is accurate as of 7/2/18 11:59 PM.  Always use your most recent med list.                   Brand Name Dispense Instructions for use Diagnosis    B-12 1000 MCG Tbcr      100 tablet    Take 1,000 mcg by mouth daily        calcium carbonate-vitamin D 600-400 MG-UNIT Chew      Take 1 chew tab by mouth        cholecalciferol 5000 units Caps capsule    vitamin D3     Take 5,000 Units by mouth daily        DULoxetine HCl 40 MG Cpep      Take 40 mg by mouth daily        flax seed oil 1000 MG capsule      Take 1 capsule by mouth daily        fluticasone propionate 0.005 % ointment    CUTIVATE    1 Tube    Apply 1 g topically 2 times daily    Rash       LYRICA PO      Take 25 mg by mouth daily        Multi-vitamin Tabs tablet   Generic drug:  multivitamin, therapeutic with minerals     30    1 TABLET DAILY        NAPROXEN PO      Take 220-440 mg by mouth 2 times daily as needed for moderate pain        PRILOSEC PO      Take 20 mg by mouth every morning        tolterodine 4 MG 24 hr capsule    DETROL LA    30 capsule    Take 1 capsule (4 mg) by mouth daily    Urgency-frequency syndrome, Urge incontinence of urine       VITAMIN B6 PO      Take 100 mg by mouth daily

## 2018-07-03 NOTE — PROGRESS NOTES
[facility] - Procedure Note    Date of Visit: 7/05/2018    Procedure performed: C7-T1 interlaminar epidural steroid injection with fluoroscopic guidance  Diagnosis: Cervical spondylosis; Cervical radiculitis/radiculopathy  : Cassia Baker MD & Cheryl Alonso MD (pain fellow)   Anesthesia: none    Indications: Louann Crocker is a 77 year old female who is seen at the request of Flor Ovalle CNP for cervical epidural steroid injection. The patient describes bilateral neck pain radiating into the arms. The patient has been exhibiting symptoms consistent with cervical intraspinal inflammation and radiculopathy. Symptoms have been persistent, disabling, and intermittently severe. The patient reports minimal improvement with conservative treatment, including PT and medications.    This is a repeat injection.  Previous YEYO done by myself on 6/07/2017 provided good pain relief for a period of 6 months.     Cervical MRI was done on 6/08/2018 which showed   FINDINGS: The cervical spinal cord and visualized portions of the  thoracic spinal cord appear normal.  The visualized portions of the  brainstem and cerebellum appear normal.     Alignment: Normal.  Anterior fusion C5-C6, unchanged in the interval.     Craniocervical Junction and C1-C2:  Normal.     C2-C3:  No disc protrusion. No central or lateral stenosis. Facet  joint disease on the right.     C3-C4:  Tiny central disc protrusion. Minimal effacement thecal sac.  No lateral stenosis.     C4-C5:  Moderate disc space narrowing. Mild disc osteophyte complex,  broad-based with minimal central stenosis. Mild foraminal stenosis on  the right.     C5-C6:  Solid anterior fusion with hardware in place. No central or  lateral stenosis.     C6-C7:  Annular bulge. No central or lateral stenosis.     C7-T1: Normal disc, facet joints, spinal canal and neural foramina.     T1-T2:  Normal disc, facet joints, spinal canal and neural foramina.       T2-T3:  Normal disc, facet joints, spinal canal and neural foramina.     Paraspinous Soft Tissues:  Normal as visualized.     Bone Marrow:  Normal signal intensity      IMPRESSION:    1. Prior anterior fusion C5-C6 which is intact.  2. Degenerative changes as described with mild central stenosis at  C4-5 and tiny central disc protrusion at C3-4.  3. No discernible change since prior CT scan 10/22/2014.      Allergies:      Allergies   Allergen Reactions     Adhesive Tape      Flagyl [Metronidazole Hcl]      Imidazole antifungals, HIVES & RASH     Nickel      rash        Vitals:  /89  Pulse 87  SpO2 95%    Review of Systems: The patient denies recent fever, chills, illness, use of antibiotics or anticoagulants. All other 10-point review of systems negative.     Procedure: The procedure and risks were explained, and informed written consent was obtained from the patient. Risks include but are not limited to: infection, bleeding, increased pain, and damage to soft tissue, nerve, muscle, and vasculature structures. After getting informed consent, patient was brought into the procedure suite and was placed in a prone position on the procedure table. A Pause for the Cause was performed. Patient was prepped and draped in sterile fashion.     The C7-T1 interspace was identified with use of fluoroscopy in AP view. A 25-gauge, 1.5 inch needle was used to anesthetize the skin and subcutaneous tissue entry site with a total of 2 ml of 1% lidocaine. Under fluoroscopic visualization, a 22-gauge, 3.5 inch Tuohy epidural needle was slowly advanced towards the epidural space a few millimeters left of midline. The latter part of the needle advancement was guided with fluoroscopy in the lateral view. The epidural space was identified using loss of resistance technique. After negative aspiration for heme and cerebrospinal fluid, a total of 1 mL of non-ionic contrast was injected to confirm needle placement. 9 mL of  contrast was wasted. Epidurogram confirmed spread within the posterior epidural space. 2 ml of 10mg/ml of dexamethasone and 1 ml of preservative free 1% lidocaine was injected. The needle was removed.  Images were saved to PACS.    The patient tolerated the procedure well, and there was no evidence of procedural complications. No new sensory or motor deficits were noted following the procedure. The patient was stable and able to ambulate on discharge home. Post-procedure instructions were provided.     Pre-procedure pain score: 7/10 in the neck, 8/10 in the arm  Post-procedure pain score: 0/10 in the neck, 0/10 in the arm    Assessment/Plan: Louann Crocker is a 77 year old female s/p cervical interlaminar epidural steroid injection today for cervical spondylosis and radiculitis/radiculopathy.     1. Following today's procedure, the patient was advised to contact the [facility] for any of the following:   Fever, chills, or night sweats   New onset of pain, numbness, or weakness   Any questions/concerns regarding the procedure  If unable to contact the Pain Center, the patient was instructed to go to a local Emergency Room for any complications.   2. The patient will receive a follow-up call in 1 week.   3. Follow-up with the referring provider in 2 weeks for post-procedure evaluation.      Cassia Baker MD   [facility]

## 2018-07-05 ENCOUNTER — RADIOLOGY INJECTION OFFICE VISIT (OUTPATIENT)
Dept: PALLIATIVE MEDICINE | Facility: CLINIC | Age: 78
End: 2018-07-05
Attending: NURSE PRACTITIONER
Payer: MEDICARE

## 2018-07-05 ENCOUNTER — RADIANT APPOINTMENT (OUTPATIENT)
Dept: GENERAL RADIOLOGY | Facility: CLINIC | Age: 78
End: 2018-07-05
Attending: ANESTHESIOLOGY
Payer: MEDICARE

## 2018-07-05 VITALS — HEART RATE: 85 BPM | SYSTOLIC BLOOD PRESSURE: 174 MMHG | DIASTOLIC BLOOD PRESSURE: 87 MMHG | OXYGEN SATURATION: 93 %

## 2018-07-05 DIAGNOSIS — M54.12 CERVICAL RADICULOPATHY: Primary | ICD-10-CM

## 2018-07-05 DIAGNOSIS — M54.12 CERVICAL RADICULAR PAIN: ICD-10-CM

## 2018-07-05 PROCEDURE — 62321 NJX INTERLAMINAR CRV/THRC: CPT | Performed by: ANESTHESIOLOGY

## 2018-07-05 NOTE — MR AVS SNAPSHOT
After Visit Summary   7/5/2018    Louann Crocker    MRN: [id number]           Patient Information     Date Of Birth          1940        Visit Information        Provider Department      7/5/2018 9:15 AM Cassia Baker MD [facility]        Care Instructions    [facility] Procedure Discharge Instructions    Today you saw:   Dr. Cassia Baker          Your procedure: Cervical Epidural steroid injection       Medications used:  Lidocaine (anesthetic)  Dexamethasone (steroid)  Omnipaque (contrast)              Be cautious when walking as numbness and/or weakness in the legs may occur up to 6-8 hours after the procedure due to effect of the local anesthetic    Do not drive for 6 hours. The effect of the local anesthetic could slow your reflexes.     Avoid strenuous activity for the first 24 hours. You may resume your regular activities after that.     You may shower, however avoid swimming, tub baths or hot tubs for 24 hours following your procedure    You may have a mild to moderate increase in pain for several days following the injection.      You may use ice packs for 10-15 minutes, 3 to 4 times a day at the injection site for comfort    Do not use heat to painful areas for 6 to 8 hours. This will give the local anesthetic time to wear off and prevent you from accidentally burning your skin.    You may use anti-inflammatory medications (such as Ibuprofen/Advil or Aleve) or Tylenol for pain control if necessary    With diabetes, check your blood sugar more frequently than usual as your blood sugar may be higher than normal for 10-14 days following a steroid injection. Contact your doctor who manages your diabetes if your blood sugar is higher than usual    It may take up to 14 days for the steroid medication to start working although you may feel the effect as early as a few days after the procedure.     Follow up with your referring provider in 2-3 weeks      If you  experience any of the following, call the pain center line during work hours at [phone] or on-call physician after hours at [phone]:  -Fever over 100 degree F  -Swelling, bleeding, redness, drainage, warmth at the injection site  -Progressive weakness or numbness in your legs or arms  -Loss of bowel or bladder function  -Unusual headache that is not relieved by Tylenol or your regular headache medication  -Unusual new onset of pain that is not improving    Phone #s:  Nurse triage line for general questions: [phone]          Follow-ups after your visit        Your next 10 appointments already scheduled     Jul 09, 2018 12:10 PM CDT   (Arrive by 11:55 AM)   ANNABELLA Spine with Amador Hodges, PT   [facility] ([facility])    [address]  [facility] [address] [phone]            Jul 16, 2018  9:30 AM CDT   ANNABELLA Spine with Abi Owens, PT   [facility] ([facility])    [address]  [facility] [address] [phone]            Jul 23, 2018  9:30 AM CDT   ANNABELLA Spine with Abi Owens, PT   [facility] ([facility])    [address]  [facility] [address] [phone]            Aug 13, 2018   Procedure with Deng Monzon MD   [facility] ([facility])    [address]  [facility] [id number] [phone]           [facility] is located at [address] [place]              Who to contact     If you have questions or need follow up information about today's clinic visit or your schedule please contact [facility] directly at [phone].  Normal or non-critical lab and imaging results will be communicated to you by MyChart, letter or phone within 4 business days after the clinic has received the results. If  you do not hear from us within 7 days, please contact the clinic through [employer] or phone. If you have a critical or abnormal lab result, we will notify you by phone as soon as possible.  Submit refill requests through [employer] or call your pharmacy and they will forward the refill request to us. Please allow 3 business days for your refill to be completed.          Additional Information About Your Visit        [employer] Information     [employer] gives you secure access to your electronic health record. If you see a primary care provider, you can also send messages to your care team and make appointments. If you have questions, please call your primary care clinic.  If you do not have a primary care provider, please call [phone] and they will assist you.        Care EveryWhere ID     This is your Care EveryWhere ID. This could be used by other organizations to access your [place] medical records  [id number]        Your Vitals Were     Pulse Pulse Oximetry                87 95%           Blood Pressure from Last 3 Encounters:   07/05/18 164/89   07/02/18 136/78   06/21/18 179/79    Weight from Last 3 Encounters:   07/02/18 80.7 kg (178 lb)   06/04/18 78 kg (172 lb)   05/25/18 78.1 kg (172 lb 1.6 oz)              Today, you had the following     No orders found for display       Primary Care Provider Office Phone # Fax #    Luciano Fonseca [phone] [address] [facility] [address]        Equal Access to Services     [facility] : Hadii nicole ku eltono [place], waaxda luqadaha, qaybta kaalmada harshalyalew, philipp dotson. So [facility] [phone].    ATENCIÓN: Si habla español, tiene a adam disposición servicios gratuitos de asistencia lingüística. Jose al [phone].    We comply with applicable federal civil rights laws and [place] laws. We do not discriminate on the basis of race, color, national origin, age, disability, sex, sexual orientation, or  gender identity.            Thank you!     Thank you for choosing [facility]  for your care. Our goal is always to provide you with excellent care. Hearing back from our patients is one way we can continue to improve our services. Please take a few minutes to complete the written survey that you may receive in the mail after your visit with us. Thank you!             Your Updated Medication List - Protect others around you: Learn how to safely use, store and throw away your medicines at www.disposemymeds.org.          This list is accurate as of 7/5/18  9:20 AM.  Always use your most recent med list.                   Brand Name Dispense Instructions for use Diagnosis    B-12 1000 MCG Tbcr     100 tablet    Take 1,000 mcg by mouth daily        calcium carbonate-vitamin D 600-400 MG-UNIT Chew      Take 1 chew tab by mouth        cholecalciferol 5000 units Caps capsule    vitamin D3     Take 5,000 Units by mouth daily        DULoxetine HCl 40 MG Cpep      Take 40 mg by mouth daily        flax seed oil 1000 MG capsule      Take 1 capsule by mouth daily        fluticasone propionate 0.005 % ointment    CUTIVATE    1 Tube    Apply 1 g topically 2 times daily    Rash       LYRICA PO      Take 25 mg by mouth daily        Multi-vitamin Tabs tablet   Generic drug:  multivitamin, therapeutic with minerals     30    1 TABLET DAILY        NAPROXEN PO      Take 220-440 mg by mouth 2 times daily as needed for moderate pain        PRILOSEC PO      Take 20 mg by mouth every morning        tolterodine 4 MG 24 hr capsule    DETROL LA    30 capsule    Take 1 capsule (4 mg) by mouth daily    Urgency-frequency syndrome, Urge incontinence of urine       VITAMIN B6 PO      Take 100 mg by mouth daily

## 2018-07-05 NOTE — NURSING NOTE
Pre-procedure Intake    Have you been fasting? No     If yes, for how long?     Are you taking a prescribed blood thinner such as coumadin, Plavix, Xarelto?    No    If yes, when did you take your last dose?     Do you take aspirin?  No    If cervical procedure, have you held aspirin for 6 days?   NA    Do you have any allergies to contrast dye, iodine, steroid and/or numbing medications?  NO    Are you currently taking antibiotics or have an active infection?  NO    Have you had a fever/elevated temperature within the past week? NO    Are you currently taking oral steroids? NO    Do you have a ? Yes       Are you pregnant or breastfeeding?  NO    Are the vital signs normal?  Yes

## 2018-07-05 NOTE — PATIENT INSTRUCTIONS
[facility] Procedure Discharge Instructions    Today you saw:   Dr. Cassia Baker          Your procedure: Cervical Epidural steroid injection       Medications used:  Lidocaine (anesthetic)  Dexamethasone (steroid)  Omnipaque (contrast)              Be cautious when walking as numbness and/or weakness in the legs may occur up to 6-8 hours after the procedure due to effect of the local anesthetic    Do not drive for 6 hours. The effect of the local anesthetic could slow your reflexes.     Avoid strenuous activity for the first 24 hours. You may resume your regular activities after that.     You may shower, however avoid swimming, tub baths or hot tubs for 24 hours following your procedure    You may have a mild to moderate increase in pain for several days following the injection.      You may use ice packs for 10-15 minutes, 3 to 4 times a day at the injection site for comfort    Do not use heat to painful areas for 6 to 8 hours. This will give the local anesthetic time to wear off and prevent you from accidentally burning your skin.    You may use anti-inflammatory medications (such as Ibuprofen/Advil or Aleve) or Tylenol for pain control if necessary    With diabetes, check your blood sugar more frequently than usual as your blood sugar may be higher than normal for 10-14 days following a steroid injection. Contact your doctor who manages your diabetes if your blood sugar is higher than usual    It may take up to 14 days for the steroid medication to start working although you may feel the effect as early as a few days after the procedure.     Follow up with your referring provider in 2-3 weeks      If you experience any of the following, call the pain center line during work hours at [phone] or on-call physician after hours at [phone]:  -Fever over 100 degree F  -Swelling, bleeding, redness, drainage, warmth at the injection site  -Progressive weakness or numbness in your legs or arms  -Loss  of bowel or bladder function  -Unusual headache that is not relieved by Tylenol or your regular headache medication  -Unusual new onset of pain that is not improving    Phone #s:  Nurse triage line for general questions: [phone]

## 2018-07-05 NOTE — NURSING NOTE
Discharge Information    IV Discontiued Time:  NA    Amount of Fluid Infused:  NA    Discharge Criteria = When patient returns to baseline or as per MD order    Consciousness:  Pt is fully awake    Circulation:  BP +/- 20% of pre-procedure level    Respiration:  Patient is able to breathe deeply    O2 Sat:  Patient is able to maintain O2 Sat >92% on room air    Activity:  Moves 4 extremities on command    Ambulation:  Patient is able to stand and walk or stand and pivot into wheelchair    Dressing:  Clean/dry or No Dressing    Notes:   Discharge instructions and AVS given to patient    Patient meets criteria for discharge?  YES    Admitted to PCU?  No    Responsible adult present to accompany patient home?  Yes    Signature/Title:    Annmarie Huertas RN Care Coordinator  [facility]

## 2018-07-06 ENCOUNTER — TELEPHONE (OUTPATIENT)
Dept: OBGYN | Facility: CLINIC | Age: 78
End: 2018-07-06

## 2018-07-06 DIAGNOSIS — N39.41 URGE INCONTINENCE OF URINE: Primary | ICD-10-CM

## 2018-07-06 RX ORDER — OXYBUTYNIN CHLORIDE 5 MG/1
5 TABLET ORAL 2 TIMES DAILY
Qty: 120 TABLET | Refills: 1 | Status: SHIPPED | OUTPATIENT
Start: 2018-07-06 | End: 2018-07-06

## 2018-07-06 RX ORDER — OXYBUTYNIN CHLORIDE 5 MG/1
5 TABLET ORAL 2 TIMES DAILY
Qty: 180 TABLET | Refills: 1 | Status: SHIPPED | OUTPATIENT
Start: 2018-07-06 | End: 2018-10-15 | Stop reason: SINTOL

## 2018-07-06 NOTE — TELEPHONE ENCOUNTER
Spoke to patient. She is willing to try an alternative. Please send to [facility] in [place].      Marysol Miranda RN

## 2018-07-06 NOTE — PROGRESS NOTES
The patient is a. 77 year old  white female  postmenopausal status post vaginal hysterectomy with BSO for menorrhagia in approximately  in her 50s, not on HRT whom I am asked to see by Dr. Anthony Fonseca for evaluation of an approximate 2 year history of urgency frequency urgency incontinence and involuntary urine loss.  Pregnancy history includes vaginal delivery ×2 the largest 8 lbs. 12 oz.,  [employer] instrumentation (patient states that her physician did deliveries back then) no, Raudel Kan MD FACOG known history of bladder or bowel injury. Patient complains of urinary leakage with getting up at night to go to the bathroom, occasional involuntary loss as well as an inability to stop her urine flow.  She denies significant symptoms to suggest stress incontinence.  She does not wear constant protection.  Denies any leakage with intercourse.  The leakage is becoming inconvenience in her life.     Asthma: Denies  Smoking: Smoked for 20 years and quit   Recurrent UTIs: Denies  Hematuria: Denies  Diabetes: Type 2 diabetes  Related medication usage:   Current Outpatient Prescriptions   Medication     calcium carbonate-vitamin D 600-400 MG-UNIT CHEW     cholecalciferol (VITAMIN D3) 5000 UNITS CAPS capsule     Cyanocobalamin (B-12) 1000 MCG TBCR     DULoxetine HCl 40 MG CPEP     Flaxseed, Linseed, (FLAX SEED OIL) 1000 MG capsule     fluticasone propionate (CUTIVATE) 0.005 % ointment     MULTI-VITAMIN OR TABS     NAPROXEN PO     Omeprazole (PRILOSEC PO)     Pregabalin (LYRICA PO)     Pyridoxine HCl (VITAMIN B6 PO)     tolterodine (DETROL LA) 4 MG 24 hr capsule     No current facility-administered medications for this visit.        Incontinence of stool or flatus: States she has IBS.  She has a son and mother with colon cancer and has colonoscopies every 5 years occasional loose stools and occasional incontinence of flatus  Symptoms of pelvic relaxation/prolapse: Denies  Voiding or defacatory dysfunction:  Feels like she incompletely empties does not use a finger but repositions oftentimes notices that she has to void 15-20 minutes after emptying her bladder  Previous evaluation: No  Kegel exercises: Did not help  Nocturia: Once or twice  Fluids: No alcohol, 6-8 cans of Diet Coke daily she states she stopped recently, cups of coffee daily, no other excessive fluid intake  Urgency: Yes with leakage and frequency    Past Medical History:   Diagnosis Date     Anxiety      DDD (degenerative disc disease), cervical      DDD (degenerative disc disease), lumbar      Fibromyalgia      GERD (gastroesophageal reflux disease)      IBS (irritable bowel syndrome)      Major depressive disorder, single episode, severe, without mention of psychotic behavior 2005    ECT x 16     Past Surgical History:   Procedure Laterality Date     ARTHROSCOPY KNEE RT/LT  2007     BACK SURGERY  5/2013    Sacroileac Fusion     BACK SURGERY  10/6/2014    Lumbar Fusion L3-L-4, L-4L-5     C FUSION MC-P JOINT,SINGLE  2010     C FUSION SHOULDER JOINT  1/01    lap bone sput     C ULLOA W/O FACETEC FORAMOT/DSKC 1/2 VRT SEG, LUMBAR  1993     C LAP,BILIARY TRACT,UNLISTED  1966     C LAPAROSCOPY, SURGICAL; W/ VAGINAL HYSTERECTOMY W/WO REMOVAL OVARY(S)/TUBES  1997    Laparoscopy, Vag Hysterectomy     CATARACT IOL, RT/LT  2008     COLONOSCOPY       ENDOSCOPIC RETROGRADE CHOLANGIOPANCREATOGRAPHY  2005     HC DILATION/CURETTAGE DIAG/THER NON OB  1961    D & C     HC ERCP W SPHINCTEROTOMY  12/09    CBD stones     HC REMOVAL GALLBLADDER  1964    Cholecystectomy     HC REPAIR ROTATOR CUFF,ACUTE  3/90     SURGICAL HISTORY OF -   2005    cervical     SURGICAL HISTORY OF -   2005    lumbar      SURGICAL HISTORY OF -   2008    right knee replacement     VITRECTOMY PARSPLANA WITH 25 GAUGE SYSTEM  5/10/2012    Procedure:VITRECTOMY PARSPLANA WITH 25 GAUGE SYSTEM; LEFT EYE 25 GAUGE VITRECTOMY, MEMBRANE STRIPPING, AIR  FLUID EXCHANGE, GAS 15% C3F8; Surgeon:ANTONIETTA WALTERS;  Location:[facility]     Family History   Problem Relation Age of Onset     Arthritis Mother      fibromyalgia     Osteoperosis Mother      Cancer Mother      colon     Hypertension Father      Cerebrovascular Disease Father      Respiratory Brother      emphysema     Social History     Social History     Marital status:      Spouse name: Amador     Number of children: 4     Years of education: N/A     Occupational History      Retired     self employed     Social History Main Topics     Smoking status: Former Smoker     Packs/day: 1.00     Years: 20.00     Types: Cigarettes     Quit date: 1/1/1977     Smokeless tobacco: Never Used     Alcohol use Yes      Comment: on occasion     Drug use: No     Sexual activity: Yes     Partners: Male     Birth control/ protection: Surgical     Other Topics Concern     Not on file     Social History Narrative     Vitals: /78  Wt 178 lb (80.7 kg)  BMI 31.53 kg/m2  BMI= Body mass index is 31.53 kg/(m^2).    Constitutional: healthy, alert and no distress  Head: Normocephalic. No masses, lesions, tenderness or abnormalities  Neck: Neck supple. No adenopathy. Thyroid symmetric, normal size,, Carotids without bruits.  ENT: NEGATIVE for ear, mouth and throat problems  Cardiovascular: negative, PMI normal. No lifts, heaves, or thrills. RRR. No murmurs, clicks gallops or rub  Respiratory: negative, Percussion normal. Good diaphragmatic excursion. Lungs clear  Gastrointestinal: Abdomen soft, non-tender. BS normal. No masses, organomegaly  Genitourinary: Normal external genitalia without lesions and patient voided 110 cc with a 30 cc postvoid residual urine specimen sent for analysis and culture, speculum exam atrophic vaginal epithelium with physiologic anterior apical and posterior compartment support, bimanual exam the uterus is absent adnexa without masses enlargement or tenderness rectovaginal exam normal anal wink normal sphincter tone minimal rectocele no  masses  Musculoskeletalextremities normal- no gross deformities noted, gait normal and normal muscle tone  Integument: no suspicious lesions or rashes  Neurologic: Gait normal. Reflexes normal and symmetric. Sensation grossly WNL.  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 Pathophysiology of the disease process reviewed, all of the patients questions answered and informed consent obtained.  We discussed the role of bladder irritants strongly advised against the continued use of methylxanthine derivatives and carbonated products.  We discussed fluid management.  I gave her detailed written outline of our discussion.  I recommended timed voidings every 2 hours we also discussed the use of an antimuscarinic.  I would like her to keep a voiding diary for 2 days now, begin Detrol LA 4 mg daily and repeat a voiding diary in 1 month for 2 days.  I like to see her back in 1 month to reassess.  Patient education information was given  Plan: UA reflex to Microscopic and Culture,         tolterodine (DETROL LA) 4 MG 24 hr capsule        Detailed written plan given    (N39.41) Urge incontinence of urine  Comment: As above  Plan: tolterodine (DETROL LA) 4 MG 24 hr capsule        As above      Raudel Kan M.D.

## 2018-07-06 NOTE — TELEPHONE ENCOUNTER
[facility] plan does not cover TOLTERODINE TART ER 4MG  CAPSULES. Plan alternatives include: Oxybutynin, Myrbetriq, or Toviaz. Please send alternative medication.        Marysol Miranda RN

## 2018-07-06 NOTE — PATIENT INSTRUCTIONS
You can reach your [facility] Care Team any time of the day by calling [phone]. This number will put you in touch with the 24 hour nurse line if the clinic is closed.    To contact your OB/GYN Station Coordinator/Surgery Scheduler please call [phone]. This is a direct number for your care team between 8 a.m. and 4 p.m. Monday through Friday.    [facility] is open for your convenience:  Monday through Friday 8 a.m. to 6 p.m.  Closed weekends and all major holidays.

## 2018-07-06 NOTE — TELEPHONE ENCOUNTER
Please advise of insurance deficiency      -do not see that she has tried alternatives      -offer trial of alternative; noting that similar, but not the same  She may try alternative or PA  Let us know

## 2018-07-09 ENCOUNTER — THERAPY VISIT (OUTPATIENT)
Dept: PHYSICAL THERAPY | Facility: CLINIC | Age: 78
End: 2018-07-09
Payer: MEDICARE

## 2018-07-09 DIAGNOSIS — G89.29 CHRONIC BILATERAL LOW BACK PAIN WITH RIGHT-SIDED SCIATICA: ICD-10-CM

## 2018-07-09 DIAGNOSIS — M54.41 CHRONIC BILATERAL LOW BACK PAIN WITH RIGHT-SIDED SCIATICA: ICD-10-CM

## 2018-07-09 DIAGNOSIS — M54.2 CERVICALGIA: ICD-10-CM

## 2018-07-09 PROCEDURE — 97110 THERAPEUTIC EXERCISES: CPT | Mod: GP | Performed by: PHYSICAL THERAPIST

## 2018-07-09 PROCEDURE — G8979 MOBILITY GOAL STATUS: HCPCS | Mod: GP | Performed by: PHYSICAL THERAPIST

## 2018-07-09 PROCEDURE — 97140 MANUAL THERAPY 1/> REGIONS: CPT | Mod: GP | Performed by: PHYSICAL THERAPIST

## 2018-07-09 PROCEDURE — 97161 PT EVAL LOW COMPLEX 20 MIN: CPT | Mod: GP | Performed by: PHYSICAL THERAPIST

## 2018-07-09 PROCEDURE — G8978 MOBILITY CURRENT STATUS: HCPCS | Mod: GP | Performed by: PHYSICAL THERAPIST

## 2018-07-09 NOTE — MR AVS SNAPSHOT
After Visit Summary   7/9/2018    Louann Crocker    MRN: [id number]           Patient Information     Date Of Birth          1940        Visit Information        Provider Department      7/9/2018 12:10 PM Amador Hodges, PT [facility] Physical Therapy        Today's Diagnoses     Cervicalgia        Chronic bilateral low back pain with right-sided sciatica           Follow-ups after your visit        Your next 10 appointments already scheduled     Jul 16, 2018  9:30 AM CDT   ANNABELLA Spine with Abi Owens PT   [facility] ([facility])    [address]  [facility] [address]   [phone]            Jul 23, 2018  9:30 AM CDT   ANNABELLA Spine with Abi Owens PT   [facility] ([facility]    [address]  [facility] [address]   [phone]            Aug 13, 2018   Procedure with Deng Monzon MD   [facility] ([facility])    [address] [place] [address] [phone]           [facility] is located at [address] [place]              Who to contact     If you have questions or need follow up information about today's clinic visit or your schedule please contact [facility] PHYSICAL THERAPY directly at [phone].  Normal or non-critical lab and imaging results will be communicated to you by MyChart, letter or phone within 4 business days after the clinic has received the results. If you do not hear from us within 7 days, please contact the clinic through MyChart or phone. If you have a critical or abnormal lab result, we will notify you by phone as soon as possible.  Submit refill requests through [employer] or call your pharmacy and they will forward the refill request to us. Please allow 3 business days for  your refill to be completed.          Additional Information About Your Visit        MyChart Information     [email or web address] gives you secure access to your electronic health record. If you see a primary care provider, you can also send messages to your care team and make appointments. If you have questions, please call your primary care clinic.  If you do not have a primary care provider, please call [phone] and they will assist you.        Care EveryWhere ID     This is your Care EveryWhere ID. This could be used by other organizations to access your [place] medical records  [id number]         Blood Pressure from Last 3 Encounters:   07/05/18 174/87   07/02/18 136/78   06/21/18 179/79    Weight from Last 3 Encounters:   07/02/18 80.7 kg (178 lb)   06/04/18 78 kg (172 lb)   05/25/18 78.1 kg (172 lb 1.6 oz)              We Performed the Following     HC PT EVAL, LOW COMPLEXITY     ANNABELLA CERT REPORT     ANNABELLA INITIAL EVAL REPORT     MANUAL THER TECH,1+REGIONS,EA 15 MIN     THERAPEUTIC EXERCISES        Primary Care Provider Office Phone # Fax #    Luciano Dylan Fonseca [phone] [address] [facility] [address]        Equal Access to Services     DANI NULL AH: Hadii nicole condeo [place], waaxda luqadaha, qaybta kaalmada adeginayada, philipp dotson. So [facility] [phone].    ATENCIÓN: Si habla español, tiene a adam disposición servicios gratuitos de asistencia lingüística. Lorenaame al [phone].    We comply with applicable federal civil rights laws and [place] laws. We do not discriminate on the basis of race, color, national origin, age, disability, sex, sexual orientation, or gender identity.            Thank you!     Thank you for choosing [facility] PH[facility]ICAL THER[facility]  for your care. Our goal is always to provide you with excellent care. Hearing back from our patients is one way we can continue to improve our services. Please  take a few minutes to complete the written survey that you may receive in the mail after your visit with us. Thank you!             Your Updated Medication List - Protect others around you: Learn how to safely use, store and throw away your medicines at www.disposemymeds.org.          This list is accurate as of 7/9/18  2:32 PM.  Always use your most recent med list.                   Brand Name Dispense Instructions for use Diagnosis    B-12 1000 MCG Tbcr     100 tablet    Take 1,000 mcg by mouth daily        calcium carbonate-vitamin D 600-400 MG-UNIT Chew      Take 1 chew tab by mouth        cholecalciferol 5000 units Caps capsule    vitamin D3     Take 5,000 Units by mouth daily        DULoxetine HCl 40 MG Cpep      Take 40 mg by mouth daily        flax seed oil 1000 MG capsule      Take 1 capsule by mouth daily        fluticasone propionate 0.005 % ointment    CUTIVATE    1 Tube    Apply 1 g topically 2 times daily    Rash       LYRICA PO      Take 25 mg by mouth daily        Multi-vitamin Tabs tablet   Generic drug:  multivitamin, therapeutic with minerals     30    1 TABLET DAILY        NAPROXEN PO      Take 220-440 mg by mouth 2 times daily as needed for moderate pain        oxybutynin 5 MG tablet    DITROPAN    180 tablet    Take 1 tablet (5 mg) by mouth 2 times daily    Urge incontinence of urine       PRILOSEC PO      Take 20 mg by mouth every morning        tolterodine 4 MG 24 hr capsule    DETROL LA    30 capsule    Take 1 capsule (4 mg) by mouth daily    Urgency-frequency syndrome, Urge incontinence of urine       VITAMIN B6 PO      Take 100 mg by mouth daily

## 2018-07-09 NOTE — LETTER
DEPARTMENT OF HEALTH AND HUMAN SERVICES  CENTERS FOR MEDICARE & MEDICAID SERVICES    PLAN/UPDATED PLAN OF PROGRESS FOR OUTPATIENT REHABILITATION    PATIENTS NAME:  Louann Crocker   : 1940  [facility]BER:    [id number]  [facility]  [facility] [place] [facility]  MEDICAL RECORD NUMBER: [id number]   START OF CARE DATE:  SOC Date: 18   TYPE:  PT  PRIMARY/TREATMENT DIAGNOSIS: (Pertinent Medical Diagnosis)     Cervicalgia  Chronic bilateral low back pain with right-sided sciatica  VISITS FROM START OF CARE:  Rxs Used: 1     [facility] Initial Evaluation  Subjective:  Patient is a 77 year old female presenting with rehab cervical spine hpi and rehab back hpi. The history is provided by the patient. No  was used.   Louann Crocker is a 77 year old female with a cervical spine condition.  Condition occurred with:  Degenerative joint disease.  Condition occurred: for unknown reasons.  This is a chronic condition  Pt c/o chronic neck pain x years.  States recent flare up without cause.  MD order date 18.  Had Cx injection 18 with no change noted.  Pt is R handed.  MH: Cx fusion and B RCR..    Patient reports pain:  Central cervical spine, cervical right side, cervical left side, lower cervical spine, mid cervical spine and upper cervical spine.  Radiates to:  Shoulder right and upper arm right.  Pain is described as sharp, shooting and stabbing and is constant and reported as 7/10.  Associated symptoms:  Loss of motion/stiffness, headache, tingling and numbness (R thumb). Pain is the same all the time.  Symptoms are exacerbated by carrying, certain positions, change of position, driving, lifting, looking up or down, lying down, rotating head, sitting and stress and relieved by nothing.  Since onset symptoms are unchanged.  Special tests:  MRI (C5-6 fusion (stable) C4-5 stenosis and C3-4 small  disc).  General health as reported by patient is good.  Pertinent medical history includes:  Depression, fibromyalgia, incontinence and overweight.  Medical allergies: adhesive, flagy.  Other surgeries include:  Orthopedic surgery (6-miltiple spine and R TKA, B RCR).  Current medications:  Anti-depressants, anti-inflammatory and pain medication.  Current occupation is Retired.  Louann Crocker is a 77 year old female with a lumbar condition.  Condition occurred with:  Insidious onset.  Condition occurred: for unknown reasons.  This is a chronic condition  Pt c/o chronic LBP x years.   Denies injury.  MD visit date 6/11/18.  No change noted with 6/21/18 Lx injection.  PMH: R TKA 10+ years, Lx fusion.  B hip injections 5/2018 due to chronic B trochanteric bursitis (no change noted).   Patient reports pain:  Lumbar spine left, lower lumbar spine, central lumbar spine, lumbar spine right, mid lumbar spine and upper lumbar spine.  Radiates to:  Gluteals right, gluteals left, knee right and thigh right.  Pain is described as sharp, shooting and stabbing and is constant and reported as 7/10.  Associated symptoms:  Loss of motion/stiffness and loss of strength (in B L/E). Pain is the same all the time.  Symptoms are exacerbated by bending, carrying, lifting, lying down, standing, walking and twisting and relieved by nothing.  Since onset symptoms are unchanged.  Special tests:  MRI ( Lx stable L3-S1 fusion and DJD L2-3 ).  General health as reported by patient is good.                Louann Crocker         Objective:   Standing Alignment:    Cervical/Thoracic:  Forward head  Shoulder/UE:  Rounded shoulders  Gait:    Assistive Devices:  None  Lumbar/SI Evaluation ROM:    AROM Lumbar:   Flexion:          Mod loss +  Ext:                    Max loss ++   Side Bend:        Left:  Mod loss +    Right:  Mod loss +  Rotation:           Left:     Right:   Side Glide:        Left:     Right:   Strength: poor core stab  recruitment. cuing for TA recruitment  Lumbar Myotomes:  Lumbar myotomes: grossly 4/5 L 4-/5 R.  Lumbar Palpation:    Tenderness present at Left:    Erector Spinae  Tenderness present at Right: Erector Spinae       Cervical/Thoracic Evaluation  Arom wnl cervical: retraction: mod loss +, rep sitting increased pain and HA, supine NE.  AROM:  AROM Cervical:  Flexion:            Min/mod loss ++  Extension:       Mod loss +  Rotation:         Left: mod loss +     Right: mod loss +  Side Bend:      Left: mod loss +     Right:  Mod loss +  Strength: fair scap stab   Headaches: cervical  Cervical Myotomes:  Cervical myotomes: grossly 4/5 B U/E.  Cervical Dermatomes:  normal  Cervical Palpation:    Tenderness present at Left:    Upper Trap; Levator; Erector Spinae and Suboccipitals  Tenderness present at Right:    Upper Trap; Levator; Erector Spinae and Suboccipitals          Assessment/Plan:    Patient is a 77 year old female with cervical and lumbar complaints.    Patient has the following significant findings with corresponding treatment plan.                Diagnosis 1:  UB/N and LBP  Pain -  hot/cold therapy, manual therapy, directional preference exercise and home program  Decreased ROM/flexibility - manual therapy and therapeutic exercise  Decreased strength - therapeutic exercise and therapeutic activities  Decreased proprioception - neuro re-education and therapeutic activities  Impaired gait - gait training  Impaired muscle performance - neuro re-education  Decreased function - therapeutic activities  Impaired posture - neuro re-education          Callahan, Louann         Therapy Evaluation Codes:   1) History comprised of:   Personal factors that impact the plan of care:      Past/current experiences and Time since onset of symptoms.    Comorbidity factors that impact the plan of care are:      Bowel/bladder changes, Depression, Fibromyalgia, Overweight, Pain at night/rest and Weakness.     Medications impacting  care: Anti-depressant, Anti-inflammatory and Pain.  2) Examination of Body Systems comprised of:   Body structures and functions that impact the plan of care:      Cervical spine and Lumbar spine.   Activity limitations that impact the plan of care are:      Bathing, Bending, Cooking, Driving, Dressing, Lifting, Reading/Computer work, Squatting/kneeling, Stairs, Standing, Walking, Sleeping and Laying down.  3) Clinical presentation characteristics are:   Stable/Uncomplicated.  4) Decision-Making    Moderate complexity using standardized patient assessment instrument and/or measureable assessment of functional outcome.  Cumulative Therapy Evaluation is: Low complexity.  Previous and current functional limitations:  (See Goal Flow Sheet for this information)    Short term and Long term goals: (See Goal Flow Sheet for this information)   Communication ability:  Patient appears to be able to clearly communicate and understand verbal and written communication and follow directions correctly.  Treatment Explanation - The following has been discussed with the patient:   RX ordered/plan of care  Anticipated outcomes  Possible risks and side effects  This patient would benefit from PT intervention to resume normal activities.   Rehab potential is fair.  Frequency:  1 X week, once daily  Duration:  for 8 weeks  Discharge Plan:  Achieve all LTG.  Independent in home treatment program.  Reach maximal therapeutic benefit.    Please refer to the daily flowsheet for treatment today, total treatment time and time spent performing 1:1 timed codes.     Caregiver Signature/Credentials _____________________________ Date ________       Treating Provider: Amador Hodges,PT   I have reviewed and certified the need for these services and plan of treatment while under my care.        PHYSICIAN'S SIGNATURE:   _________________________________________  Date___________   Flor Ovalle, Louann Blue           Certification  "period:  Beginning of Cert date period: 07/09/18 to  End of Cert period date: 10/06/18     Functional Level Progress Report: Please see attached \"Goal Flow sheet for Functional level.\"    ____X____ Continue Services or       ________ DC Services                Service dates: From  SOC Date: 07/09/18 date to present                         "

## 2018-07-09 NOTE — PROGRESS NOTES
[place] [facility] Initial Evaluation  Subjective:  Patient is a 77 year old female presenting with rehab cervical spine hpi and rehab back hpi. The history is provided by the patient. No  was used.   Louann Crocker is a 77 year old female with a cervical spine condition.  Condition occurred with:  Degenerative joint disease.  Condition occurred: for unknown reasons.  This is a chronic condition  Pt c/o chronic neck pain x years.  States recent flare up without cause.  MD order date 6/11/18.  Had Cx injection 7/5/18 with no change noted.  Pt is R handed.  MH: Cx fusion and B RCR..    Patient reports pain:  Central cervical spine, cervical right side, cervical left side, lower cervical spine, mid cervical spine and upper cervical spine.  Radiates to:  Shoulder right and upper arm right.  Pain is described as sharp, shooting and stabbing and is constant and reported as 7/10.  Associated symptoms:  Loss of motion/stiffness, headache, tingling and numbness (R thumb). Pain is the same all the time.  Symptoms are exacerbated by carrying, certain positions, change of position, driving, lifting, looking up or down, lying down, rotating head, sitting and stress and relieved by nothing.  Since onset symptoms are unchanged.  Special tests:  MRI (C5-6 fusion (stable) C4-5 stenosis and C3-4 small disc).      General health as reported by patient is good.  Pertinent medical history includes:  Depression, fibromyalgia, incontinence and overweight.  Medical allergies: adhesive, flagy.  Other surgeries include:  Orthopedic surgery (6-miltiple spine and R TKA, B RCR).  Current medications:  Anti-depressants, anti-inflammatory and pain medication.  Current occupation is Retired  .                  Louann Crocker is a 77 year old female with a lumbar condition.  Condition occurred with:  Insidious onset.  Condition occurred: for unknown reasons.  This is a chronic condition  Pt c/o chronic LBP  x years.   Denies injury.  MD visit date 6/11/18.  No change noted with 6/21/18 Lx injection.  PMH: R TKA 10+ years, Lx fusion.  B hip injections 5/2018 due to chronic B trochanteric bursitis (no change noted)..    Patient reports pain:  Lumbar spine left, lower lumbar spine, central lumbar spine, lumbar spine right, mid lumbar spine and upper lumbar spine.  Radiates to:  Gluteals right, gluteals left, knee right and thigh right.  Pain is described as sharp, shooting and stabbing and is constant and reported as 7/10.  Associated symptoms:  Loss of motion/stiffness and loss of strength (in B L/E). Pain is the same all the time.  Symptoms are exacerbated by bending, carrying, lifting, lying down, standing, walking and twisting and relieved by nothing.  Since onset symptoms are unchanged.  Special tests:  MRI ( Lx stable L3-S1 fusion and DJD L2-3 ).      General health as reported by patient is good.                                              Objective:  Standing Alignment:    Cervical/Thoracic:  Forward head  Shoulder/UE:  Rounded shoulders              Gait:    Assistive Devices:  None                 Lumbar/SI Evaluation  ROM:    AROM Lumbar:   Flexion:          Mod loss +  Ext:                    Max loss ++   Side Bend:        Left:  Mod loss +    Right:  Mod loss +  Rotation:           Left:     Right:   Side Glide:        Left:     Right:         Strength: poor core stab recruitment. cuing for TA recruitment  Lumbar Myotomes:  Lumbar myotomes: grossly 4/5 L 4-/5 R.                    Lumbar Palpation:    Tenderness present at Left:    Erector Spinae  Tenderness present at Right: Erector Spinae             Cervical/Thoracic Evaluation  Arom wnl cervical: retraction: mod loss +, rep sitting increased pain and HA, supine NE.     AROM:  AROM Cervical:    Flexion:            Min/mod loss ++  Extension:       Mod loss +  Rotation:         Left: mod loss +     Right: mod loss +  Side Bend:      Left: mod loss +      Right:  Mod loss +    Strength: fair scap stab   Headaches: cervical  Cervical Myotomes:  Cervical myotomes: grossly 4/5 B U/E.                      Cervical Dermatomes:  normal                    Cervical Palpation:    Tenderness present at Left:    Upper Trap; Levator; Erector Spinae and Suboccipitals  Tenderness present at Right:    Upper Trap; Levator; Erector Spinae and Suboccipitals                                                  General     ROS    Assessment/Plan:    Patient is a 77 year old female with cervical and lumbar complaints.    Patient has the following significant findings with corresponding treatment plan.                Diagnosis 1:  UB/N and LBP  Pain -  hot/cold therapy, manual therapy, directional preference exercise and home program  Decreased ROM/flexibility - manual therapy and therapeutic exercise  Decreased strength - therapeutic exercise and therapeutic activities  Decreased proprioception - neuro re-education and therapeutic activities  Impaired gait - gait training  Impaired muscle performance - neuro re-education  Decreased function - therapeutic activities  Impaired posture - neuro re-education    Therapy Evaluation Codes:   1) History comprised of:   Personal factors that impact the plan of care:      Past/current experiences and Time since onset of symptoms.    Comorbidity factors that impact the plan of care are:      Bowel/bladder changes, Depression, Fibromyalgia, Overweight, Pain at night/rest and Weakness.     Medications impacting care: Anti-depressant, Anti-inflammatory and Pain.  2) Examination of Body Systems comprised of:   Body structures and functions that impact the plan of care:      Cervical spine and Lumbar spine.   Activity limitations that impact the plan of care are:      Bathing, Bending, Cooking, Driving, Dressing, Lifting, Reading/Computer work, Squatting/kneeling, Stairs, Standing, Walking, Sleeping and Laying down.  3) Clinical presentation characteristics  are:   Stable/Uncomplicated.  4) Decision-Making    Moderate complexity using standardized patient assessment instrument and/or measureable assessment of functional outcome.  Cumulative Therapy Evaluation is: Low complexity.    Previous and current functional limitations:  (See Goal Flow Sheet for this information)    Short term and Long term goals: (See Goal Flow Sheet for this information)     Communication ability:  Patient appears to be able to clearly communicate and understand verbal and written communication and follow directions correctly.  Treatment Explanation - The following has been discussed with the patient:   RX ordered/plan of care  Anticipated outcomes  Possible risks and side effects  This patient would benefit from PT intervention to resume normal activities.   Rehab potential is fair.    Frequency:  1 X week, once daily  Duration:  for 8 weeks  Discharge Plan:  Achieve all LTG.  Independent in home treatment program.  Reach maximal therapeutic benefit.    Please refer to the daily flowsheet for treatment today, total treatment time and time spent performing 1:1 timed codes.

## 2018-07-16 ENCOUNTER — THERAPY VISIT (OUTPATIENT)
Dept: PHYSICAL THERAPY | Facility: CLINIC | Age: 78
End: 2018-07-16
Payer: MEDICARE

## 2018-07-16 DIAGNOSIS — M54.41 CHRONIC BILATERAL LOW BACK PAIN WITH RIGHT-SIDED SCIATICA: ICD-10-CM

## 2018-07-16 DIAGNOSIS — M54.2 CERVICALGIA: ICD-10-CM

## 2018-07-16 DIAGNOSIS — G89.29 CHRONIC BILATERAL LOW BACK PAIN WITH RIGHT-SIDED SCIATICA: ICD-10-CM

## 2018-07-16 PROCEDURE — 97110 THERAPEUTIC EXERCISES: CPT | Mod: GP | Performed by: PHYSICAL THERAPIST

## 2018-07-16 PROCEDURE — 97140 MANUAL THERAPY 1/> REGIONS: CPT | Mod: GP | Performed by: PHYSICAL THERAPIST

## 2018-07-16 NOTE — PROGRESS NOTES
Subjective:  HPI                    Objective:  System    Physical Exam    General     ROS    Assessment/Plan:    SUBJECTIVE  Subjective changes as noted by pt:   Neck soreness after the first treatment, which lasted for one day. Getting better now. Tolerating exercises well.    Current pain level: :  (neck 7/10, back 5-6/10)   Changes in function:  None     Adverse reaction to treatment or activity:  None    OBJECTIVE  Changes in objective findings:  Yes,  Moderate bilateral suboccipital and minimal bilateral CPVM tone. Fair posture with effort. Fair scap and abdominal control.      ASSESSMENT  Louann continues to require intervention to meet STG and LTG's: PT  Patient is progressing as expected.  Patient is ready to progress to more complex exercises.  Response to therapy has shown an improvement in  flexibility and posture  Progress made towards STG/LTG?  None    PLAN  Current treatment program is being advanced to more complex exercises.    PTA/ATC plan:  N/A    Please refer to the daily flowsheet for treatment today, total treatment time and time spent performing 1:1 timed codes.

## 2018-07-16 NOTE — MR AVS SNAPSHOT
After Visit Summary   7/16/2018    Louann Crocker    MRN: [id number]           Patient Information     Date Of Birth          1940        Visit Information        Provider Department      7/16/2018 9:30 AM Abi Owens PT [facility]] [facility]        Today's Diagnoses     Cervicalgia        Chronic bilateral low back pain with right-sided sciatica           Follow-ups after your visit        Your next 10 appointments already scheduled     Jul 23, 2018  9:30 AM CDT   ANNABELLA Spine with Abi Owens PT   [facility]3] [facility] ([facility] [place])    [address]  [facility] [address]   [phone]            Aug 13, 2018   Procedure with Deng Monzon MD   [facility] End[facility] ([facility])    [address] [place] [id number] [phone]           [facility] is located at [address] [place]              Who to contact     If you have questions or need follow up information about today's clinic visit or your schedule please contact [facility] PHYSICAL THERAP[facility] directly at [phone].  Normal or non-critical lab and imaging results will be communicated to you by MyChart, letter or phone within 4 business days after the clinic has received the results. If you do not hear from us within 7 days, please contact the clinic through MyChart or phone. If you have a critical or abnormal lab result, we will notify you by phone as soon as possible.  Submit refill requests through Holvi or call your pharmacy and they will forward the refill request to us. Please allow 3 business days for your refill to be completed.          Additional Information About Your Visit        Mozaicohart Information     Holvi gives you secure access to your electronic health record. If you see a primary care provider, you can also send  messages to your care team and make appointments. If you have questions, please call your primary care clinic.  If you do not have a primary care provider, please call [phone] and they will assist you.        Care EveryWhere ID     This is your Care EveryWhere ID. This could be used by other organizations to access your [place] medical records  [id number]         Blood Pressure from Last 3 Encounters:   07/05/18 174/87   07/02/18 136/78   06/21/18 179/79    Weight from Last 3 Encounters:   07/02/18 80.7 kg (178 lb)   06/04/18 78 kg (172 lb)   05/25/18 78.1 kg (172 lb 1.6 oz)              We Performed the Following     MANUAL THER TECH,1+REGIONS,EA 15 MIN     THERAPEUTIC EXERCISES        Primary Care Provider Office Phone # Fax #    Luciano Fonseca [phone] [address] [facility] [address]        Equal Access to Services     DANI NULL : Hadii aad ku hadasho Soomaali, waaxda luqadaha, qaybta kaalmada adeegyada, waxay lisain hayblaisen harshal martinez . So [facility] [phone].    ATENCIÓN: Si robles pickard, tiene a adam disposición servicios gratuitos de asistencia lingüística. Llame al [phone].    We comply with applicable federal civil rights laws and [place] laws. We do not discriminate on the basis of race, color, national origin, age, disability, sex, sexual orientation, or gender identity.            Thank you!     Thank you for choosing [facility] PHYSICAL THER[facility]  for your care. Our goal is always to provide you with excellent care. Hearing back from our patients is one way we can continue to improve our services. Please take a few minutes to complete the written survey that you may receive in the mail after your visit with us. Thank you!             Your Updated Medication List - Protect others around you: Learn how to safely use, store and throw away your medicines at www.disposemymeds.org.          This list is accurate  as of 7/16/18  9:59 AM.  Always use your most recent med list.                   Brand Name Dispense Instructions for use Diagnosis    B-12 1000 MCG Tbcr     100 tablet    Take 1,000 mcg by mouth daily        calcium carbonate-vitamin D 600-400 MG-UNIT Chew      Take 1 chew tab by mouth        cholecalciferol 5000 units Caps capsule    vitamin D3     Take 5,000 Units by mouth daily        DULoxetine HCl 40 MG Cpep      Take 40 mg by mouth daily        flax seed oil 1000 MG capsule      Take 1 capsule by mouth daily        fluticasone propionate 0.005 % ointment    CUTIVATE    1 Tube    Apply 1 g topically 2 times daily    Rash       LYRICA PO      Take 25 mg by mouth daily        Multi-vitamin Tabs tablet   Generic drug:  multivitamin, therapeutic with minerals     30    1 TABLET DAILY        NAPROXEN PO      Take 220-440 mg by mouth 2 times daily as needed for moderate pain        oxybutynin 5 MG tablet    DITROPAN    180 tablet    Take 1 tablet (5 mg) by mouth 2 times daily    Urge incontinence of urine       PRILOSEC PO      Take 20 mg by mouth every morning        tolterodine 4 MG 24 hr capsule    DETROL LA    30 capsule    Take 1 capsule (4 mg) by mouth daily    Urgency-frequency syndrome, Urge incontinence of urine       VITAMIN B6 PO      Take 100 mg by mouth daily

## 2018-07-24 ENCOUNTER — TELEPHONE (OUTPATIENT)
Dept: SURGERY | Facility: CLINIC | Age: 78
End: 2018-07-24

## 2018-07-24 ENCOUNTER — OFFICE VISIT (OUTPATIENT)
Dept: FAMILY MEDICINE | Facility: CLINIC | Age: 78
End: 2018-07-24
Payer: MEDICARE

## 2018-07-24 VITALS
TEMPERATURE: 98.1 F | WEIGHT: 177.6 LBS | RESPIRATION RATE: 14 BRPM | BODY MASS INDEX: 31.46 KG/M2 | SYSTOLIC BLOOD PRESSURE: 125 MMHG | OXYGEN SATURATION: 96 % | HEART RATE: 81 BPM | DIASTOLIC BLOOD PRESSURE: 74 MMHG

## 2018-07-24 DIAGNOSIS — M54.2 CERVICALGIA: ICD-10-CM

## 2018-07-24 DIAGNOSIS — D17.1 BENIGN LIPOMATOUS NEOPLASM OF SKIN AND SUBCUTANEOUS TISSUE OF TRUNK: ICD-10-CM

## 2018-07-24 DIAGNOSIS — R73.9 HYPERGLYCEMIA: ICD-10-CM

## 2018-07-24 DIAGNOSIS — M54.41 ACUTE BILATERAL LOW BACK PAIN WITH RIGHT-SIDED SCIATICA: ICD-10-CM

## 2018-07-24 DIAGNOSIS — M62.81 GENERALIZED MUSCLE WEAKNESS: Primary | ICD-10-CM

## 2018-07-24 LAB
ALBUMIN SERPL-MCNC: 3.7 G/DL (ref 3.4–5)
ALP SERPL-CCNC: 55 U/L (ref 40–150)
ALT SERPL W P-5'-P-CCNC: 38 U/L (ref 0–50)
ANION GAP SERPL CALCULATED.3IONS-SCNC: 8 MMOL/L (ref 3–14)
AST SERPL W P-5'-P-CCNC: 27 U/L (ref 0–45)
BASOPHILS # BLD AUTO: 0.1 10E9/L (ref 0–0.2)
BASOPHILS NFR BLD AUTO: 0.8 %
BILIRUB SERPL-MCNC: 0.6 MG/DL (ref 0.2–1.3)
BUN SERPL-MCNC: 17 MG/DL (ref 7–30)
CALCIUM SERPL-MCNC: 9.6 MG/DL (ref 8.5–10.1)
CHLORIDE SERPL-SCNC: 107 MMOL/L (ref 94–109)
CO2 SERPL-SCNC: 26 MMOL/L (ref 20–32)
CREAT SERPL-MCNC: 0.58 MG/DL (ref 0.52–1.04)
DIFFERENTIAL METHOD BLD: NORMAL
EOSINOPHIL # BLD AUTO: 0.3 10E9/L (ref 0–0.7)
EOSINOPHIL NFR BLD AUTO: 3.2 %
ERYTHROCYTE [DISTWIDTH] IN BLOOD BY AUTOMATED COUNT: 13.7 % (ref 10–15)
GFR SERPL CREATININE-BSD FRML MDRD: >90 ML/MIN/1.7M2
GLUCOSE SERPL-MCNC: 90 MG/DL (ref 70–99)
HCT VFR BLD AUTO: 44.7 % (ref 35–47)
HGB BLD-MCNC: 14.6 G/DL (ref 11.7–15.7)
LYMPHOCYTES # BLD AUTO: 2 10E9/L (ref 0.8–5.3)
LYMPHOCYTES NFR BLD AUTO: 24 %
MCH RBC QN AUTO: 30.3 PG (ref 26.5–33)
MCHC RBC AUTO-ENTMCNC: 32.7 G/DL (ref 31.5–36.5)
MCV RBC AUTO: 93 FL (ref 78–100)
MONOCYTES # BLD AUTO: 0.7 10E9/L (ref 0–1.3)
MONOCYTES NFR BLD AUTO: 8.5 %
NEUTROPHILS # BLD AUTO: 5.3 10E9/L (ref 1.6–8.3)
NEUTROPHILS NFR BLD AUTO: 63.5 %
PLATELET # BLD AUTO: 188 10E9/L (ref 150–450)
POTASSIUM SERPL-SCNC: 4 MMOL/L (ref 3.4–5.3)
PROT SERPL-MCNC: 6.6 G/DL (ref 6.8–8.8)
RBC # BLD AUTO: 4.82 10E12/L (ref 3.8–5.2)
SODIUM SERPL-SCNC: 141 MMOL/L (ref 133–144)
T3FREE SERPL-MCNC: 2.3 PG/ML (ref 2.3–4.2)
T4 FREE SERPL-MCNC: 0.98 NG/DL (ref 0.76–1.46)
TSH SERPL DL<=0.005 MIU/L-ACNC: 0.6 MU/L (ref 0.4–4)
VIT B12 SERPL-MCNC: 521 PG/ML (ref 193–986)
WBC # BLD AUTO: 8.4 10E9/L (ref 4–11)

## 2018-07-24 PROCEDURE — 80053 COMPREHEN METABOLIC PANEL: CPT | Performed by: FAMILY MEDICINE

## 2018-07-24 PROCEDURE — 85025 COMPLETE CBC W/AUTO DIFF WBC: CPT | Performed by: FAMILY MEDICINE

## 2018-07-24 PROCEDURE — 84439 ASSAY OF FREE THYROXINE: CPT | Performed by: FAMILY MEDICINE

## 2018-07-24 PROCEDURE — 99214 OFFICE O/P EST MOD 30 MIN: CPT | Performed by: FAMILY MEDICINE

## 2018-07-24 PROCEDURE — 84443 ASSAY THYROID STIM HORMONE: CPT | Performed by: FAMILY MEDICINE

## 2018-07-24 PROCEDURE — 36415 COLL VENOUS BLD VENIPUNCTURE: CPT | Performed by: FAMILY MEDICINE

## 2018-07-24 PROCEDURE — 82607 VITAMIN B-12: CPT | Performed by: FAMILY MEDICINE

## 2018-07-24 PROCEDURE — 84481 FREE ASSAY (FT-3): CPT | Performed by: FAMILY MEDICINE

## 2018-07-24 ASSESSMENT — PATIENT HEALTH QUESTIONNAIRE - PHQ9
SUM OF ALL RESPONSES TO PHQ QUESTIONS 1-9: 4
SUM OF ALL RESPONSES TO PHQ QUESTIONS 1-9: 4

## 2018-07-24 NOTE — MR AVS SNAPSHOT
After Visit Summary   7/24/2018    Louann Crocker    MRN: [id number]           Patient Information     Date Of Birth          1940        Visit Information        Provider Department      7/24/2018 9:15 AM Luciano Fonseca MD [facility]        Today's Diagnoses     Generalized muscle weakness    -  1    Acute bilateral low back pain with right-sided sciatica        Benign lipomatous neoplasm of skin and subcutaneous tissue of trunk           Follow-ups after your visit        Additional Services     GENERAL SURG ADULT REFERRAL       Your provider has referred you to: [facility]: [facility] [place] [phone]   [email or web address]    Please be aware that coverage of these services is subject to the terms and limitations of your health insurance plan.  Call member services at your health plan with any benefit or coverage questions.      Please bring the following with you to your appointment:    (1) Any X-Rays, CTs or MRIs which have been performed.  Contact the facility where they were done to arrange for  prior to your scheduled appointment.   (2) List of current medications   (3) This referral request   (4) Any documents/labs given to you for this referral            NEUROLOGY ADULT REFERRAL       Your provider has referred you for the following:   Consult at [facility]: [facility] [phone]   [email or web address]    Please be aware that coverage of these services is subject to the terms and limitations of your health insurance plan.  Call member services at your health plan with any benefit or coverage questions.      Please bring the following with you to your appointment:    (1) Any X-Rays, CTs or MRIs which have been performed.  Contact the facility where they were done to arrange for  prior to your scheduled appointment.    (2) List of  current medications  (3) This referral request   (4) Any documents/labs given to you for this referral                  Follow-up notes from your care team     Return in about 4 weeks (around 8/21/2018).      Your next 10 appointments already scheduled     Jul 30, 2018  9:30 AM CDT   ANNABELL[facility] Spine with Abi Owens PT   [place] [facility]    [address]  [facility] [address] [phone]            Aug 13, 2018   Procedure with Deng Monzon MD   [facility] ([facility])    [address]  [facility] [address]   [phone]           [facility] is located at [address] [facility] [place]            Aug 21, 2018  2:30 PM CDT   SHORT with Raudel Kan MD   [facility] ([facility])    [address] [place]  [facility] [address]   [phone]              Who to contact     If you have questions or need follow up information about today's clinic visit or your schedule please contact [facility] directly at [phone].  Normal or non-critical lab and imaging results will be communicated to you by MyChart, letter or phone within 4 business days after the clinic has received the results. If you do not hear from us within 7 days, please contact the clinic through MyChart or phone. If you have a critical or abnormal lab result, we will notify you by phone as soon as possible.  Submit refill requests through Trellie or call your pharmacy and they will forward the refill request to us. Please allow 3 business days for your refill to be completed.          Additional Information About Your Visit        Trellie Information     Trellie gives you secure access to your electronic health record. If you see a primary care provider, you can also send messages to your care team and make appointments. If you have questions, please call your  primary care clinic.  If you do not have a primary care provider, please call [phone] and they will assist you.        Care EveryWhere ID     This is your Care EveryWhere ID. This could be used by other organizations to access your [place] medical records  [id number]        Your Vitals Were     Pulse Temperature Respirations Pulse Oximetry BMI (Body Mass Index)       81 98.1  F (36.7  C) (Oral) 14 96% 31.46 kg/m2        Blood Pressure from Last 3 Encounters:   07/24/18 125/74   07/05/18 174/87   07/02/18 136/78    Weight from Last 3 Encounters:   07/24/18 177 lb 9.6 oz (80.6 kg)   07/02/18 178 lb (80.7 kg)   06/04/18 172 lb (78 kg)              We Performed the Following     CBC with platelets and differential     Comprehensive metabolic panel     GENERAL SURG ADULT REFERRAL     NEUROLOGY ADULT REFERRAL     T3, Free     T4, free     TSH with free T4 reflex     Vitamin B12        Primary Care Provider Office Phone # Fax #    Luciano Fonseca [phone] [address] [facility] [address]        Equal Access to Services     YANA [facility] HospitalMARILYNN : Hadii nicole mobley [place], waaxda luqadaha, qaybta kaalmalew almaraz, philipp martinez . So [facility] [phone].    ATENCIÓN: Si habla español, tiene a adam disposición servicios gratuitos de asistencia lingüística. Llame al [phone].    We comply with applicable federal civil rights laws and [place] laws. We do not discriminate on the basis of race, color, national origin, age, disability, sex, sexual orientation, or gender identity.            Thank you!     Thank you for choosing [facility]  for your care. Our goal is always to provide you with excellent care. Hearing back from our patients is one way we can continue to improve our services. Please take a few minutes to complete the written survey that you may receive in the mail after your visit with us. Thank you!             Your Updated  Medication List - Protect others around you: Learn how to safely use, store and throw away your medicines at www.disposemymeds.org.          This list is accurate as of 7/24/18  9:44 AM.  Always use your most recent med list.                   Brand Name Dispense Instructions for use Diagnosis    B-12 1000 MCG Tbcr     100 tablet    Take 1,000 mcg by mouth daily        calcium carbonate-vitamin D 600-400 MG-UNIT Chew      Take 1 chew tab by mouth        cholecalciferol 5000 units Caps capsule    vitamin D3     Take 5,000 Units by mouth daily        DULoxetine HCl 40 MG Cpep      Take 40 mg by mouth daily        flax seed oil 1000 MG capsule      Take 1 capsule by mouth daily        fluticasone propionate 0.005 % ointment    CUTIVATE    1 Tube    Apply 1 g topically 2 times daily    Rash       LYRICA PO      Take 25 mg by mouth daily        Multi-vitamin Tabs tablet   Generic drug:  multivitamin, therapeutic with minerals     30    1 TABLET DAILY        NAPROXEN PO      Take 220-440 mg by mouth 2 times daily as needed for moderate pain        oxybutynin 5 MG tablet    DITROPAN    180 tablet    Take 1 tablet (5 mg) by mouth 2 times daily    Urge incontinence of urine       PRILOSEC PO      Take 20 mg by mouth every morning        tolterodine 4 MG 24 hr capsule    DETROL LA    30 capsule    Take 1 capsule (4 mg) by mouth daily    Urgency-frequency syndrome, Urge incontinence of urine       VITAMIN B6 PO      Take 100 mg by mouth daily

## 2018-07-24 NOTE — TELEPHONE ENCOUNTER
Referral received from [facility] MONTELONGO for lipoma.    Attempt #1:    Called patient at [phone].  No answer - left message for patient to return call to clinic at [phone].  Mar[place]ell

## 2018-07-24 NOTE — PROGRESS NOTES
Answers for HPI/ROS submitted by the patient on 7/24/2018   PHQ9 TOTAL SCORE: 4    SUBJECTIVE:   Louann Crocker is a 77 year old female who presents to clinic today for the following health issues:      Patient is here today for a follow of back pain, generalized weakness arms and legs   SUBJECTIVE:    CC: Louann Crocker is a 77 year old female who presents for follow up of back pain and a sense of inreased fatigue, no fever, no malaise, no nausea    HPI: she spends the winter in [place], feels that over the past year her back issues are worse and has been seeing Pain Management with epidural and PT but is impatient to get some progress. Feels that her pain is less but her energy isn't   I suggested that we investigate her weakness and also get a consultation from a Neurologist. She has been diagnosed in the past with fibromyalgia but her fatigue seems greater to her than it has been. She's  Able to walk the length of the building, No issues with memory. She feels that her back pain botheres her sleep         PROBLEM LIST:                   Patient Active Problem List   Diagnosis     Symptomatic menopausal or female climacteric states     Esophageal reflux     Myalgia and myositis     Anomalous atrioventricular excitation     Calculus of kidney     IBS (irritable bowel syndrome)     Fibromyalgia     Anxiety     Major depressive disorder, single episode, severe (H)     GERD (gastroesophageal reflux disease)     Elevated blood sugar     Hypertriglyceridemia     Menopausal syndrome (hot flashes)     CARDIOVASCULAR SCREENING; LDL GOAL LESS THAN 160     HL (hearing loss)     Biliary colic     S/P spinal surgery     Spondylosis     Spinal stenosis     Sacroiliitis (H)     Conversion disorder     Delirium due to multiple etiologies     Encephalopathy     Urinary tract infection due to ESBL Klebsiella     Sepsis (H)     Status post lumbar surgery     Elevated blood pressure     Hyperglycemia     Sensorineural  hearing loss, unilateral     Cervicalgia     Chronic bilateral low back pain with right-sided sciatica       PAST MEDICAL HISTORY:                  Past Medical History:   Diagnosis Date     Anxiety      DDD (degenerative disc disease), cervical      DDD (degenerative disc disease), lumbar      Fibromyalgia      GERD (gastroesophageal reflux disease)      IBS (irritable bowel syndrome)      Major depressive disorder, single episode, severe, without mention of psychotic behavior 2005    ECT x 16       PAST SURGICAL HISTORY:                  Past Surgical History:   Procedure Laterality Date     ARTHROSCOPY KNEE RT/LT  2007     BACK SURGERY  5/2013    Sacroileac Fusion     BACK SURGERY  10/6/2014    Lumbar Fusion L3-L-4, L-4L-5     C FUSION MC-P JOINT,SINGLE  2010     C FUSION SHOULDER JOINT  1/01    lap bone sput     C ULLOA W/O FACETEC FORAMOT/DSKC 1/2 VRT SEG, LUMBAR  1993     C LAP,BILIARY TRACT,UNLISTED  1966     C LAPAROSCOPY, SURGICAL; W/ VAGINAL HYSTERECTOMY W/WO REMOVAL OVARY(S)/TUBES  1997    Laparoscopy, Vag Hysterectomy     CATARACT IOL, RT/LT  2008     COLONOSCOPY       ENDOSCOPIC RETROGRADE CHOLANGIOPANCREATOGRAPHY  2005     HC DILATION/CURETTAGE DIAG/THER NON OB  1961    D & C     HC ERCP W SPHINCTEROTOMY  12/09    CBD stones     HC REMOVAL GALLBLADDER  1964    Cholecystectomy     HC REPAIR ROTATOR CUFF,ACUTE  3/90     SURGICAL HISTORY OF -   2005    cervical     SURGICAL HISTORY OF -   2005    lumbar      SURGICAL HISTORY OF -   2008    right knee replacement     VITRECTOMY PARSPLANA WITH 25 GAUGE SYSTEM  5/10/2012    Procedure:VITRECTOMY PARSPLANA WITH 25 GAUGE SYSTEM; LEFT EYE 25 GAUGE VITRECTOMY, MEMBRANE STRIPPING, AIR  FLUID EXCHANGE, GAS 15% C3F8; Surgeon:ANTONIETTA WALTERS; Location:[facility]       CURRENT MEDICATIONS:                  Current Outpatient Prescriptions   Medication Sig Dispense Refill     calcium carbonate-vitamin D 600-400 MG-UNIT CHEW Take 1 chew tab by mouth       cholecalciferol  (VITAMIN D3) 5000 UNITS CAPS capsule Take 5,000 Units by mouth daily       Cyanocobalamin (B-12) 1000 MCG TBCR Take 1,000 mcg by mouth daily 100 tablet 1     DULoxetine HCl 40 MG CPEP Take 40 mg by mouth daily       Flaxseed, Linseed, (FLAX SEED OIL) 1000 MG capsule Take 1 capsule by mouth daily       fluticasone propionate (CUTIVATE) 0.005 % ointment Apply 1 g topically 2 times daily 1 Tube 11     MULTI-VITAMIN OR TABS 1 TABLET DAILY 30 0     NAPROXEN PO Take 220-440 mg by mouth 2 times daily as needed for moderate pain       Omeprazole (PRILOSEC PO) Take 20 mg by mouth every morning       oxybutynin (DITROPAN) 5 MG tablet Take 1 tablet (5 mg) by mouth 2 times daily 180 tablet 1     Pregabalin (LYRICA PO) Take 25 mg by mouth daily       Pyridoxine HCl (VITAMIN B6 PO) Take 100 mg by mouth daily       tolterodine (DETROL LA) 4 MG 24 hr capsule Take 1 capsule (4 mg) by mouth daily 30 capsule 1             FAMILY HISTORY:                   Family History   Problem Relation Age of Onset     Arthritis Mother      fibromyalgia     Osteoperosis Mother      Cancer Mother      colon     Hypertension Father      Cerebrovascular Disease Father      Respiratory Brother      emphysema       HEALTH MAINTENANCE:                    REVIEW OF OUTSIDE RECORDS: NO    REVIEW OF SYSTEMS:  CONSTITUTIONAL: NEGATIVE for fever, chills  INTEGUMENTARY/SKIN: NEGATIVE for worrisome rashes, moles or lesions  EYES: NEGATIVE for vision changes   ENT/MOUTH: NEGATIVE for ear, mouth and throat problems  RESP: NEGATIVE for significant cough or SOB  CV: NEGATIVE for chest pain, palpitations   GI: NEGATIVE for nausea, abdominal pain, heartburn, or change in bowel habits  : NEGATIVE for frequency, dysuria, or hematuria  MUSCULOSKELETAL: NEGATIVE for significant arthralgias or myalgia  NEURO: NEGATIVE for weakness, dizziness or paresthesias or headache  NVS:no headache or balance issus  INTEG:no moles or new rashes  LYMPH:no nodes or night sweats  She's  had  Chronic neck and back pain   EXAM:    /74 (BP Location: Left arm, Patient Position: Chair, Cuff Size: Adult Regular)  Pulse 81  Temp 98.1  F (36.7  C) (Oral)  Resp 14  Wt 177 lb 9.6 oz (80.6 kg)  SpO2 96%  BMI 31.46 kg/m2  GENERAL APPEARANCE: healthy, alert and no distress   EXAM:  GENERAL APPEARANCE: healthy, alert and no distress  EYES: EOMI,  PERRL  HENT: ear canals and TM's normal and nose and mouth without ulcers or lesions  NECK: no adenopathy, no asymmetry, masses, or scars and thyroid normal to palpation  RESP: lungs clear to auscultation - no rales, rhonchi or wheezes  CV: regular rates and rhythm, normal S1 S2, no S3 or S4 and no murmur, click or rub -  ABDOMEN:  soft, nontender, no HSM or masses and bowel sounds normal  MS: extremities normal- no gross deformities noted, no evidence of inflammation in joints, FROM in all extremities.  BACK:no tenderness or pain on straight let raise  She has ibs from anticholinergic meds          ASSESSMENT/PLAN  (M62.81) Generalized muscle weakness  (primary encounter diagnosis)  Comment:   Plan: CBC with platelets and differential,         Comprehensive metabolic panel, NEUROLOGY ADULT         REFERRAL, T4, free, T3, Free, TSH with free T4         reflex, Vitamin B12, CANCELED: TSH with free T4        reflex        investigate    (M54.41) Acute bilateral low back pain with right-sided sciatica  Comment:   Plan: NEUROLOGY ADULT REFERRAL        Consult     (D17.1) Benign lipomatous neoplasm of skin and subcutaneous tissue of trunk  Comment:   Plan: GENERAL SURG ADULT REFERRAL        Surgery consult     (M54.2) Cervicalgia  Comment: discussed meds, Pain Mgt and weakness  Plan: follow up Pain Mgmt    (R73.9) Hyperglycemia  Comment: check for any worsening   Plan:          Current Outpatient Prescriptions   Medication     calcium carbonate-vitamin D 600-400 MG-UNIT CHEW     cholecalciferol (VITAMIN D3) 5000 UNITS CAPS capsule     Cyanocobalamin (B-12) 1000  MCG TBCR     DULoxetine HCl 40 MG CPEP     Flaxseed, Linseed, (FLAX SEED OIL) 1000 MG capsule     fluticasone propionate (CUTIVATE) 0.005 % ointment     MULTI-VITAMIN OR TABS     NAPROXEN PO     Omeprazole (PRILOSEC PO)     oxybutynin (DITROPAN) 5 MG tablet     Pregabalin (LYRICA PO)     Pyridoxine HCl (VITAMIN B6 PO)     tolterodine (DETROL LA) 4 MG 24 hr capsule     No current facility-administered medications for this visit.      She feels that her anticholinergic bladder pills bother her colon and we discussed bulk supplements      Check for glucose, get consults, consider med side effects,                            I have discussed with patient the risks, benefits, medications, treatment options and modalities.   I have instructed the patient to call or schedule a follow-up appointment if any problems or failure to improve.

## 2018-07-25 ASSESSMENT — PATIENT HEALTH QUESTIONNAIRE - PHQ9: SUM OF ALL RESPONSES TO PHQ QUESTIONS 1-9: 4

## 2018-07-30 ENCOUNTER — THERAPY VISIT (OUTPATIENT)
Dept: PHYSICAL THERAPY | Facility: CLINIC | Age: 78
End: 2018-07-30
Payer: MEDICARE

## 2018-07-30 DIAGNOSIS — M54.2 CERVICALGIA: ICD-10-CM

## 2018-07-30 DIAGNOSIS — M54.41 CHRONIC BILATERAL LOW BACK PAIN WITH RIGHT-SIDED SCIATICA: ICD-10-CM

## 2018-07-30 DIAGNOSIS — G89.29 CHRONIC BILATERAL LOW BACK PAIN WITH RIGHT-SIDED SCIATICA: ICD-10-CM

## 2018-07-30 PROCEDURE — 97140 MANUAL THERAPY 1/> REGIONS: CPT | Mod: GP | Performed by: PHYSICAL THERAPIST

## 2018-07-30 PROCEDURE — 97110 THERAPEUTIC EXERCISES: CPT | Mod: GP | Performed by: PHYSICAL THERAPIST

## 2018-07-30 NOTE — MR AVS SNAPSHOT
After Visit Summary   7/30/2018    Louann Crocker    MRN: [id number]           Patient Information     Date Of Birth          1940        Visit Information        Provider Department      7/30/2018 9:30 AM Abi Owens PT [facility] Physical Therapy        Today's Diagnoses     Cervicalgia        Chronic bilateral low back pain with right-sided sciatica           Follow-ups after your visit        Your next 10 appointments already scheduled     Aug 02, 2018  9:30 AM CDT   CONSULT with Bianca Leos MD   [facility]    [address] [facility] [address]  [facility] [address]   [phone]            Aug 13, 2018   Procedure with Deng Monzon MD   [facility] ([facility])    [address] [facility] [address]   [phone]           [facility] is located at [address] [facility]            Aug 21, 2018  2:30 PM CDT   SHORT with Raudel Kan MD   [facility] ([facility]    [address] [facility] [address]   [phone]              Who to contact     If you have questions or need follow up information about today's clinic visit or your schedule please contact [facility] directly at [phone].  Normal or non-critical lab and imaging results will be communicated to you by MyChart, letter or phone within 4 business days after the clinic has received the results. If you do not hear from us within 7 days, please contact the clinic through MyChart or phone. If you have a critical or abnormal lab result, we will notify you by phone as soon as possible.  Submit refill requests through [employer] or call your pharmacy and they will forward the refill request to us. Please allow 3 business days for your refill to be completed.           Additional Information About Your Visit        MyChart Information     [employer] gives you secure access to your electronic health record. If you see a primary care provider, you can also send messages to your care team and make appointments. If you have questions, please call your primary care clinic.  If you do not have a primary care provider, please call [phone] and they will assist you.        Care EveryWhere ID     This is your Care EveryWhere ID. This could be used by other organizations to access your [place] medical records  [id number]         Blood Pressure from Last 3 Encounters:   07/24/18 125/74   07/05/18 174/87   07/02/18 136/78    Weight from Last 3 Encounters:   07/24/18 80.6 kg (177 lb 9.6 oz)   07/02/18 80.7 kg (178 lb)   06/04/18 78 kg (172 lb)              We Performed the Following     MANUAL THER TECH,1+REGIONS,EA 15 MIN     THERAPEUTIC EXERCISES        Primary Care Provider Office Phone # Fax #    Luciano Fonseca [phone] [address] [facility] [address]        Equal Access to Services     [facility]: Hadii aad ku hadasho [place], waaxda luqadaha, qaybta kaalmalew almaraz, philipp martinez . So [facility] [phone].    ATENCIÓN: Si habla español, tiene a adam disposición servicios gratVeodiaos de asistencia lingüística. [facility] [phone].    We comply with applicable federal civil rights laws and [place] laws. We do not discriminate on the basis of race, color, national origin, age, disability, sex, sexual orientation, or gender identity.            Thank you!     Thank you for choosing [facility]  for your care. Our goal is always to provide you with excellent care. Hearing back from our patients is one way we can continue to improve our services. Please take a few minutes to complete the written survey that you may receive in the mail after your visit with us. Thank  you!             Your Updated Medication List - Protect others around you: Learn how to safely use, store and throw away your medicines at www.disposemymeds.org.          This list is accurate as of 7/30/18 11:15 AM.  Always use your most recent med list.                   Brand Name Dispense Instructions for use Diagnosis    B-12 1000 MCG Tbcr     100 tablet    Take 1,000 mcg by mouth daily        calcium carbonate-vitamin D 600-400 MG-UNIT Chew      Take 1 chew tab by mouth        cholecalciferol 5000 units Caps capsule    vitamin D3     Take 5,000 Units by mouth daily        DULoxetine HCl 40 MG Cpep      Take 40 mg by mouth daily        flax seed oil 1000 MG capsule      Take 1 capsule by mouth daily        fluticasone propionate 0.005 % ointment    CUTIVATE    1 Tube    Apply 1 g topically 2 times daily    Rash       LYRICA PO      Take 25 mg by mouth daily        Multi-vitamin Tabs tablet   Generic drug:  multivitamin, therapeutic with minerals     30    1 TABLET DAILY        NAPROXEN PO      Take 220-440 mg by mouth 2 times daily as needed for moderate pain        oxybutynin 5 MG tablet    DITROPAN    180 tablet    Take 1 tablet (5 mg) by mouth 2 times daily    Urge incontinence of urine       PRILOSEC PO      Take 20 mg by mouth every morning        tolterodine 4 MG 24 hr capsule    DETROL LA    30 capsule    Take 1 capsule (4 mg) by mouth daily    Urgency-frequency syndrome, Urge incontinence of urine       VITAMIN B6 PO      Take 100 mg by mouth daily

## 2018-07-30 NOTE — PROGRESS NOTES
Subjective:  HPI                    Objective:  System    Physical Exam    General     ROS    Assessment/Plan:    SUBJECTIVE  Subjective changes as noted by pt:   Sore the day after last treatment and also after exercises. She is seeing a neurologist the end of August. Able to ascend stairs reciprocally now.    Current pain level:  (5-6/10 neck and back)   Changes in function:  Yes (See Goal flowsheet attached for changes in current functional level)     Adverse reaction to treatment or activity:  None    OBJECTIVE  Changes in objective findings:  Yes,  Minimal L > R subcoccipital and B CPVM and UT tone. Fair to good posture with effort and fair to good scap control. Fair to good abdominal control and fair glut max control.      ASSESSMENT  Louann continues to require intervention to meet STG and LTG's: PT  Patient's symptoms are resolving.  Patient is ready to progress to more complex exercises.  Response to therapy has shown an improvement in  flexibility, muscle control, posture and function  Progress made towards STG/LTG?  Yes (See Goal flowsheet attached for updates on achievement of STG and LTG)    PLAN  Current treatment program is being advanced to more complex exercises.    PTA/ATC plan:  N/A    Please refer to the daily flowsheet for treatment today, total treatment time and time spent performing 1:1 timed codes.

## 2018-08-02 ENCOUNTER — OFFICE VISIT (OUTPATIENT)
Dept: SURGERY | Facility: CLINIC | Age: 78
End: 2018-08-02
Payer: MEDICARE

## 2018-08-02 VITALS
SYSTOLIC BLOOD PRESSURE: 136 MMHG | HEIGHT: 63 IN | WEIGHT: 175 LBS | DIASTOLIC BLOOD PRESSURE: 80 MMHG | OXYGEN SATURATION: 94 % | BODY MASS INDEX: 31.01 KG/M2 | RESPIRATION RATE: 16 BRPM | HEART RATE: 80 BPM

## 2018-08-02 DIAGNOSIS — R22.9 LOCALIZED SUPERFICIAL SWELLING, MASS, OR LUMP: Primary | ICD-10-CM

## 2018-08-02 PROCEDURE — 99203 OFFICE O/P NEW LOW 30 MIN: CPT | Performed by: SURGERY

## 2018-08-02 ASSESSMENT — ENCOUNTER SYMPTOMS
CONSTIPATION: 1
CHANGE IN BOWEL HABIT: 1
ARTHRALGIAS: 1
TROUBLE SWALLOWING: 1
NAUSEA: 1
CLAUDICATION: 1
BRUISES/BLEEDS EASILY: 1
DIARRHEA: 1

## 2018-08-02 NOTE — PROGRESS NOTES
HPI      ROS (Review of Systems):     HENT: Positive for difficult swallowing.    Cardiovascular: Positive for leg pain.   GASTROINTESTINAL: Positive for nausea, diarrhea, constipation and change in bowel habit.   Genitourinary: Positive for nocturia.   MUSCULOSKELETAL: Positive for arthralgias and back pain.   Endo/Heme/Allergies: Bruises/bleeds easily.          Physical Exam

## 2018-08-02 NOTE — LETTER
"2018    Re: Louann Crocker - 1940    Louann Crocker is a 77 year old female seen in consultation for right upper arm lump, at the request of Luciano Fonseca MD.     1 cm right upper arm subcutaneous mass.      Plan:  We have discussed the options of observation with self exams monthly vs excisional biopsy.  We discussed excision with local anesthetic.  She elects close observation.       She will call when and if she elects an excisional biopsy.        HPI:  Louann Crocker is a 77 year old female presents today for a right upper arm subcutaneous mass.      Duration:  1 year   H/o trauma:  No  Drainage:  No  Previous infections:  No  Other such masses now or in the past:  Yes: left forearm, recently noted.  Family h/o similar masses:  No  Pain:  No  Size:  increasing     Past Medical History:  has a past medical history of Anxiety; DDD (degenerative disc disease), cervical; DDD (degenerative disc disease), lumbar; Fibromyalgia; GERD (gastroesophageal reflux disease); History of blood transfusion; IBS (irritable bowel syndrome); and Major depressive disorder, single episode, severe, without mention of psychotic behavior ().    Family history reviewed and not pertinent.     ROS:  The 10 point review of systems is negative other than noted in the HPI and above.     PE:  /80 (BP Location: Left arm, Cuff Size: Adult Regular)  Pulse 80  Resp 16  Ht 5' 3\" (1.6 m)  Wt 175 lb (79.4 kg)  SpO2 94%  Breastfeeding? No  BMI 31 kg/m2  General appearance: well-developed, well-nourished, no apparent distress  HEENT:  Head normocephalic and atraumatic, pupils equal and round, conjunctivae clear, mucous membranes moist, external ears and nose normal  Lungs: respirations unlabored  Musculoskeletal:  Normal station and gait  Extremities: warm, without edema  Neurologic: alert, speech is clear, moves all extremities with good strength  Psychiatric: Mood and affect are " appropriate  Skin: There is a 1 cm subcutaneous mass on/in the right upper arm.  The overlying skin is normal.  It is non-tender.          Bianca Leos MD

## 2018-08-02 NOTE — MR AVS SNAPSHOT
After Visit Summary   8/2/2018    Louann Crocker    MRN: [id number]           Patient Information     Date Of Birth          1940        Visit Information        Provider Department      8/2/2018 9:30 AM Bianca Leos MD [facility]      Today's Diagnoses     Localized superficial swelling, mass, or lump    -  1       Follow-ups after your visit        Your next 10 appointments already scheduled     Aug 13, 2018   Procedure with Deng Monzon MD   [facility] ([facility])    [address] [place]  [facility] [address]   [phone]           [facility] is located at [address] [place]            Aug 21, 2018  2:30 PM CDT   SHORT with Raudel Kan MD   [facility] ([facility])    [address] [place]  [facility] [address]   [phone]              Who to contact     If you have questions or need follow up information about today's clinic visit or your schedule please contact [facility] directly at [phone].  Normal or non-critical lab and imaging results will be communicated to you by MyChart, letter or phone within 4 business days after the clinic has received the results. If you do not hear from us within 7 days, please contact the clinic through MyChart or phone. If you have a critical or abnormal lab result, we will notify you by phone as soon as possible.  Submit refill requests through [employer] or call your pharmacy and they will forward the refill request to us. Please allow 3 business days for your refill to be completed.          Additional Information About Your Visit        MyChart Information     [employer] gives you secure access to your electronic health record. If you see a primary care provider, you can also send messages to your care team and make appointments. If you have questions,  "please call your primary care clinic.  If you do not have a primary care provider, please call [phone] and they will assist you.        Care EveryWhere ID     This is your Care EveryWhere ID. This could be used by other organizations to access your [place] medical records  [id number]        Your Vitals Were     Pulse Respirations Height Pulse Oximetry Breastfeeding? BMI (Body Mass Index)    80 16 5' 3\" (1.6 m) 94% No 31 kg/m2       Blood Pressure from Last 3 Encounters:   08/02/18 136/80   07/24/18 125/74   07/05/18 174/87    Weight from Last 3 Encounters:   08/02/18 175 lb (79.4 kg)   07/24/18 177 lb 9.6 oz (80.6 kg)   07/02/18 178 lb (80.7 kg)              Today, you had the following     No orders found for display       Primary Care Provider Office Phone # Fax #    Luciano Dylan Fonseca [phone]        Equal Access to Services     [facility]: Hadii aad ku hadasho [place], waaxda luqadaha, qaybta kaalmada adeegyada, waxay abigail haypavel martinez . So [facility] [phone].    ATENCIÓN: Si habla español, tiene a adam disposición servicios gratuitos de asistencia lingüística. Llame al [phone].    We comply with applicable federal civil rights laws and [place] laws. We do not discriminate on the basis of race, color, national origin, age, disability, sex, sexual orientation, or gender identity.            Thank you!     Thank you for choosing [facility]  for your care. Our goal is always to provide you with excellent care. Hearing back from our patients is one way we can continue to improve our services. Please take a few minutes to complete the written survey that you may receive in the mail after your visit with us. Thank you!             Your Updated Medication List - Protect others around you: Learn how to safely use, store and throw away your medicines at www.disposemymeds.org.          This list is " accurate as of 8/2/18  9:55 AM.  Always use your most recent med list.                   Brand Name Dispense Instructions for use Diagnosis    B-12 1000 MCG Tbcr     100 tablet    Take 1,000 mcg by mouth daily        calcium carbonate-vitamin D 600-400 MG-UNIT Chew      Take 1 chew tab by mouth        cholecalciferol 5000 units Caps capsule    vitamin D3     Take 5,000 Units by mouth daily        DULoxetine HCl 40 MG Cpep      Take 40 mg by mouth daily        flax seed oil 1000 MG capsule      Take 1 capsule by mouth daily        fluticasone propionate 0.005 % ointment    CUTIVATE    1 Tube    Apply 1 g topically 2 times daily    Rash       LYRICA PO      Take 25 mg by mouth daily        Multi-vitamin Tabs tablet   Generic drug:  multivitamin, therapeutic with minerals     30    1 TABLET DAILY        NAPROXEN PO      Take 220-440 mg by mouth 2 times daily as needed for moderate pain        oxybutynin 5 MG tablet    DITROPAN    180 tablet    Take 1 tablet (5 mg) by mouth 2 times daily    Urge incontinence of urine       PRILOSEC PO      Take 20 mg by mouth every morning        VITAMIN B6 PO      Take 100 mg by mouth daily

## 2018-08-02 NOTE — PROGRESS NOTES
Assessment:   Louann Crocker is a 77 year old female seen in consultation for right upper arm lump, at the request of Luciano Fonseca MD.    1 cm right upper arm subcutaneous mass.     Plan:  We have discussed the options of observation with self exams monthly vs excisional biopsy.  We discussed excision with local anesthetic.  She elects close observation.      She will call when and if she elects an excisional biopsy.        HPI:  Louann Crocker is a 77 year old female presents today for a right upper arm subcutaneous mass.     Duration:  1 year   H/o trauma:  No  Drainage:  No  Previous infections:  No  Other such masses now or in the past:  Yes: left forearm, recently noted.  Family h/o similar masses:  No  Pain:  No  Size:  increasing    Past Medical History:   has a past medical history of Anxiety; DDD (degenerative disc disease), cervical; DDD (degenerative disc disease), lumbar; Fibromyalgia; GERD (gastroesophageal reflux disease); History of blood transfusion; IBS (irritable bowel syndrome); and Major depressive disorder, single episode, severe, without mention of psychotic behavior (2005).    Past Surgical History:  Past Surgical History:   Procedure Laterality Date     APPENDECTOMY       ARTHROSCOPY KNEE RT/LT  2007     BACK SURGERY  5/2013    Sacroileac Fusion     BACK SURGERY  10/6/2014    Lumbar Fusion L3-L-4, L-4L-5     C FUSION MC-P JOINT,SINGLE  2010     C FUSION SHOULDER JOINT  1/01    lap bone sput     C ULLOA W/O FACETEC FORAMOT/DSKC 1/2 VRT SEG, LUMBAR  1993     C LAP,BILIARY TRACT,UNLISTED  1966     C LAPAROSCOPY, SURGICAL; W/ VAGINAL HYSTERECTOMY W/WO REMOVAL OVARY(S)/TUBES  1997    Laparoscopy, Vag Hysterectomy     CATARACT IOL, RT/LT  2008     COLONOSCOPY       ENDOSCOPIC RETROGRADE CHOLANGIOPANCREATOGRAPHY  2005     HC DILATION/CURETTAGE DIAG/THER NON OB  1961    D & C     HC ERCP W SPHINCTEROTOMY  12/09    CBD stones     HC REMOVAL GALLBLADDER  1964    Cholecystectomy     HC  "REPAIR ROTATOR CUFF,ACUTE  3/90     SURGICAL HISTORY OF -   2005    cervical     SURGICAL HISTORY OF -   2005    lumbar      SURGICAL HISTORY OF -   2008    right knee replacement     VITRECTOMY PARSPLANA WITH 25 GAUGE SYSTEM  5/10/2012    Procedure:VITRECTOMY PARSPLANA WITH 25 GAUGE SYSTEM; LEFT EYE 25 GAUGE VITRECTOMY, MEMBRANE STRIPPING, AIR  FLUID EXCHANGE, GAS 15% C3F8; Surgeon:ANTONIETTA WALTERS; Location:[facility]        Social History:  Social History     Social History     Marital status:      Spouse name: Amador     Number of children: 4     Years of education: N/A     Occupational History      Retired     self employed     Social History Main Topics     Smoking status: Former Smoker     Packs/day: 1.00     Years: 20.00     Types: Cigarettes     Quit date: 1/1/1977     Smokeless tobacco: Never Used     Alcohol use Yes      Comment: on occasion     Drug use: No     Sexual activity: Yes     Partners: Male     Birth control/ protection: Surgical     Other Topics Concern     Not on file     Social History Narrative        Family History:  Family History   Problem Relation Age of Onset     Arthritis Mother      fibromyalgia     Osteoperosis Mother      Cancer Mother      colon     Colon Cancer Mother      Hypertension Father      Cerebrovascular Disease Father      Substance Abuse Father      Substance Abuse Brother      Substance Abuse Son      Colon Cancer Son      Respiratory Brother      emphysema     Family history reviewed and not pertinent.    ROS:  The 10 point review of systems is negative other than noted in the HPI and above.    PE:  /80 (BP Location: Left arm, Cuff Size: Adult Regular)  Pulse 80  Resp 16  Ht 5' 3\" (1.6 m)  Wt 175 lb (79.4 kg)  SpO2 94%  Breastfeeding? No  BMI 31 kg/m2  General appearance: well-developed, well-nourished, no apparent distress  HEENT:  Head normocephalic and atraumatic, pupils equal and round, conjunctivae clear, mucous membranes moist, external ears and " nose normal  Lungs: respirations unlabored  Musculoskeletal:  Normal station and gait  Extremities: warm, without edema  Neurologic: alert, speech is clear, moves all extremities with good strength  Psychiatric: Mood and affect are appropriate  Skin: There is a 1 cm subcutaneous mass on/in the right upper arm.  The overlying skin is normal.  It is non-tender.        This note was created using voice recognition software. Undetected word substitutions or other errors may have occurred.     Time spent with the patient with greater that 50% of the time in discussion was 5 minutes.     Bianca Leos MD    Please route or send letter to:  Primary Care Provider (PCP) and Referring Provider

## 2018-08-13 ENCOUNTER — HOSPITAL ENCOUNTER (OUTPATIENT)
Facility: CLINIC | Age: 78
Discharge: HOME | End: 2018-08-13
Attending: INTERNAL MEDICINE | Admitting: INTERNAL MEDICINE
Payer: MEDICARE

## 2018-08-13 VITALS
HEIGHT: 63 IN | RESPIRATION RATE: 10 BRPM | BODY MASS INDEX: 31.01 KG/M2 | SYSTOLIC BLOOD PRESSURE: 153 MMHG | DIASTOLIC BLOOD PRESSURE: 81 MMHG | WEIGHT: 175 LBS | OXYGEN SATURATION: 92 %

## 2018-08-13 LAB — COLONOSCOPY: NORMAL

## 2018-08-13 PROCEDURE — 99153 MOD SED SAME PHYS/QHP EA: CPT | Performed by: INTERNAL MEDICINE

## 2018-08-13 PROCEDURE — 88305 TISSUE EXAM BY PATHOLOGIST: CPT | Mod: 26 | Performed by: INTERNAL MEDICINE

## 2018-08-13 PROCEDURE — 88305 TISSUE EXAM BY PATHOLOGIST: CPT | Performed by: INTERNAL MEDICINE

## 2018-08-13 PROCEDURE — G0500 MOD SEDAT ENDO SERVICE >5YRS: HCPCS | Performed by: INTERNAL MEDICINE

## 2018-08-13 PROCEDURE — 45385 COLONOSCOPY W/LESION REMOVAL: CPT | Performed by: INTERNAL MEDICINE

## 2018-08-13 PROCEDURE — 25000128 H RX IP 250 OP 636: Performed by: INTERNAL MEDICINE

## 2018-08-13 RX ORDER — ONDANSETRON 2 MG/ML
4 INJECTION INTRAMUSCULAR; INTRAVENOUS
Status: DISCONTINUED | OUTPATIENT
Start: 2018-08-13 | End: 2018-08-13 | Stop reason: HOSPADM

## 2018-08-13 RX ORDER — ONDANSETRON 4 MG/1
4 TABLET, ORALLY DISINTEGRATING ORAL EVERY 6 HOURS PRN
Status: DISCONTINUED | OUTPATIENT
Start: 2018-08-13 | End: 2018-08-13 | Stop reason: HOSPADM

## 2018-08-13 RX ORDER — FENTANYL CITRATE 50 UG/ML
INJECTION, SOLUTION INTRAMUSCULAR; INTRAVENOUS PRN
Status: DISCONTINUED | OUTPATIENT
Start: 2018-08-13 | End: 2018-08-13 | Stop reason: HOSPADM

## 2018-08-13 RX ORDER — FLUMAZENIL 0.1 MG/ML
0.2 INJECTION, SOLUTION INTRAVENOUS
Status: DISCONTINUED | OUTPATIENT
Start: 2018-08-13 | End: 2018-08-13 | Stop reason: HOSPADM

## 2018-08-13 RX ORDER — LIDOCAINE 40 MG/G
CREAM TOPICAL
Status: DISCONTINUED | OUTPATIENT
Start: 2018-08-13 | End: 2018-08-13 | Stop reason: HOSPADM

## 2018-08-13 RX ORDER — NALOXONE HYDROCHLORIDE 0.4 MG/ML
.1-.4 INJECTION, SOLUTION INTRAMUSCULAR; INTRAVENOUS; SUBCUTANEOUS
Status: DISCONTINUED | OUTPATIENT
Start: 2018-08-13 | End: 2018-08-13 | Stop reason: HOSPADM

## 2018-08-13 RX ORDER — ONDANSETRON 2 MG/ML
4 INJECTION INTRAMUSCULAR; INTRAVENOUS EVERY 6 HOURS PRN
Status: DISCONTINUED | OUTPATIENT
Start: 2018-08-13 | End: 2018-08-13 | Stop reason: HOSPADM

## 2018-08-13 NOTE — LETTER
July 19, 2018      Louann Crocker  [address]  [facility] [address]        Thank you for choosing [facility]. You are scheduled for the following service.     Date:  8/13/2018 Monday             Procedure:  COLONOSCOPY  Doctor:        Dr. Deng Monzon   Arrival Time:  7:00 AM  *Check in at Emergency/Endoscopy desk*  Procedure Time:  7:30 AM    Location:   [facility]        Endoscopy Department, First Floor (Enter through ER Doors) *        [address] [place], [place] [address]      [phone] or [phone] () to reschedule      MIRALAX -GATORADE  PREP  Colonoscopy is the most accurate test to detect colon polyps and colon cancer; and the only test where polyps can be removed. During this procedure, a doctor examines the lining of your large intestine and rectum through a flexible tube.     Transportation  Arrange for a ride for the day of your procedure with a responsible adult.  A taxi ride is not an option unless you are accompanied by a responsible adult. If you fail to arrange transportation with a responsible adult, your procedure will be cancelled and rescheduled.    Purchase the  following supplies at your local pharmacy:  - 2 (two) bisacodyl tablets: each tablet contains 5 mg.  (Dulcolax  laxative NOT Dulcolax  stool softener)   - 1 (one) 8.3 oz bottle of Polyethylene Glycol (PEG) 3350 Powder   (MiraLAX , Smooth LAX , ClearLAX  or equivalent)  - 64 oz Gatorade    Regular Gatorade, Gatorade G2 , Powerade , Powerade Zero  or Pedialyte  is acceptable. Red colored flavors are not allowed; all other colors (yellow, green, orange, purple and blue) are okay. It is also okay to buy two 2.12 oz packets of powdered Gatorade that can be mixed with water to a total volume of 64 oz of liquid.  - 1 (one) 10 oz bottle of Magnesium Citrate (Red colored flavors are not allowed)  It is also okay for you to use a 0.5 oz package of powdered  magnesium citrate (17 g) mixed with 10 oz of water.    PREPARATION FOR COLONOSCOPY    7 days before:    Discontinue fiber supplements and medications containing iron. This includes Metamucil  and Fibercon ; and multivitamins with iron.  3 days before:    Begin a low-fiber diet. A low-fiber diet helps making the cleanout more effective.     Examples of a low-fiber diet include (but are not limited to): white bread, white rice, pasta, crackers, fish, chicken, eggs, ground beef, creamy peanut butter, cooked/steamed/boiled vegetables, canned fruit, bananas, melons, milk, plain yogurt cheese, salad dressing and other condiments.     The following are not allowed on a low-fiber diet: seeds, nuts, popcorn, bran, whole wheat, corn, quinoa, raw fruits and vegetables, berries and dried fruit, beans and lentils.    For additional details on low-fiber diet, please refer to the table on the last page.  2 days before:    Continue the low-fiber diet.     Drink at least 8 glasses of water throughout the day.     Stop eating solid foods at 11:45 pm.  1 day before:    In the morning: begin a clear liquid diet (liquids you can see through).     Examples of a clear liquid diet include: water, clear broth or bouillon, Gatorade, Pedialyte or Powerade, carbonated and non-carbonated soft drinks (Sprite , 7-Up , ginger ale), strained fruit juices without pulp (apple, white grape, white cranberry), Jell-O  and popsicles.     The following are not allowed on a clear liquid diet: red liquids, alcoholic beverages, dairy products (milk, creamer, and yogurt), protein shakes, creamy broths, juice with pulp and chewing tobacco.    At noon: take 2 (two) bisacodyl tablets     At 4 (and no later than 6pm): start drinking the Miralax-Gatorade preparation (8.3 oz of Miralax mixed with 64 oz of Gatorade in a large pitcher). Drink 1(one) 8 oz glass every 15 minutes thereafter, until the mixture is gone.    COLON CLEANSING TIPS: drink adequate amounts of  fluids before and after your colon cleansing to prevent dehydration. Stay near a toilet because you will have diarrhea. Even if you are sitting on the toilet, continue to drink the cleansing solution every 15 minutes. If you feel nauseous or vomit, rinse your mouth with water, take a 15 to 30-minute-break and then continue drinking the solution. You will be uncomfortable until the stool has flushed from your colon (in about 2 to 4 hours). You may feel chilled.      Day of your procedure  You may take all of your morning medications including blood pressure medications, blood thinners (if you have not been instructed to stop these by our office), methadone, anti-seizure medications with sips of water 3 hours prior to your procedure or earlier. Do not take insulin or vitamins prior to your procedure. Continue the clear liquid diet.   4 hours prior: drink 10 oz of magnesium citrate. It may be easier to drink it with a straw.    STOP consuming all liquids after that.     Do not take anything by mouth during this time.     Allow extra time to travel to your procedure as you may need to stop and use a restroom along the way.  You are ready for the procedure, if you followed all instructions and your stool is no longer formed, but clear or yellow liquid. If you are unsure whether your colon is clean, please call our office at [phone] before you leave for your appointment.  Bring the following to your procedure:  - Insurance Card/Photo ID.   - List of current medications including over-the-counter medications and supplements.   - Your rescue inhaler if you currently use one to control asthma.      Canceling or rescheduling your appointment:   If you must cancel or reschedule your appointment, please call [phone] as soon as possible.      COLONOSCOPY PRE-PROCEDURE CHECKLIST  If you have diabetes, ask your regular doctor for diet and medication restrictions.  If you take an anticoagulant or anti-platelet medication  (such as Coumadin , Lovenox , Pradaxa , Xarelto , Eliquis , etc.), please call your primary doctor for advice on holding this medication.  If you take aspirin you may continue to do so.  If you are or may be pregnant, please discuss the risks and benefits of this procedure with your doctor.          What happens during a colonoscopy?    Plan to spend up to two hours, starting at registration time, at the endoscopy center the day of your procedure. The colonoscopy takes an average of 15 to 30 minutes. Recovery time is about 30 minutes.    Before the exam:    You will change into a gown.    Your medical history and medication list will be reviewed with you, unless that has been done over the phone prior to the procedure.     A nurse will insert an intravenous (IV) line into your hand or arm.    The doctor will meet with you and will give you a consent form to sign.    During the exam:     Medicine will be given through the IV line to help you relax.     Your heart rate and oxygen levels will be monitored. If your blood pressure is low, you may be given fluids through the IV line.     The doctor will insert a flexible hollow tube, called a colonoscope, into your rectum. The scope will be advanced slowly through the large intestine (colon).    You may have a feeling of fullness or pressure.     If an abnormal tissue or a polyp is found, the doctor may remove it through the endoscope for closer examination, or biopsy. Tissue removal is painless    After the exam:           Any tissue samples removed during the exam will be sent to a lab for evaluation. It may take 5-7 working days for you to be notified of the results.     A nurse will provide you with complete discharge instructions before you leave the endoscopy center. Be sure to ask the nurse for specific instructions if you take blood thinners such as Aspirin, Coumadin or Plavix.     The doctor will prepare a full report for you and for the physician who referred  you for the procedure.     Your doctor will talk with you about the initial results of your exam.      Medication given during the exam will prohibit you from driving for the rest of the day.     Following the exam, you may resume your normal diet. Your first meal should be light, no greasy foods. Avoid alcohol until the next day.     You may resume your regular activities the day after the procedure.     LOW-FIBER DIET    Foods RECOMMENDED Foods to AVOID   Breads, Cereal, Rice and Pasta:   White bread, rolls, biscuits, croissant and karley toast.   Waffles, Welsh toast and pancakes.   White rice, noodles, pasta, macaroni and peeled cooked potatoes.   Plain crackers and saltines.   Cooked cereals: farina, cream of rice.   Cold cereals: Puffed Rice , Rice Krispies , Corn Flakes  and Special K    Breads, Cereal, Rice and Pasta:   Breads or rolls with nuts, seeds or fruit.   Whole wheat, pumpernickel, rye breads and cornbread.   Potatoes with skin, brown or wild rice, and kasha (buckwheat).     Vegetables:   Tender cooked and canned vegetables without seeds: carrots, asparagus tips, green or wax beans, pumpkin, spinach, lima beans. Vegetables:   Raw or steamed vegetables.   Vegetables with seeds.   Sauerkraut.   Winter squash, peas, broccoli, Brussel sprouts, cabbage, onions, cauliflower, baked beans, peas and corn.   Fruits:   Strained fruit juice.   Canned fruit, except pineapple.   Ripe bananas and melon. Fruits:   Prunes and prune juice.   Raw fruits.   Dried fruits: figs, dates and raisins.   Milk/Dairy:   Milk: plain or flavored.   Yogurt, custard and ice cream.   Cheese and cottage cheese Milk/Dairy:     Meat and other proteins:   ground, well-cooked tender beef, lamb, ham, veal, pork, fish, poultry and organ meats.   Eggs.   Peanut butter without nuts. Meat and other proteins:   Tough, fibrous meats with gristle.   Dry beans, peas and lentils.   Peanut butter with nuts.   Tofu.   Fats, Snack, Sweets, Condiments  and Beverages:   Margarine, butter, oils, mayonnaise, sour cream and salad dressing, plain gravy.   Sugar, hard candy, clear jelly, honey and syrup.   Spices, cooked herbs, bouillon, broth and soups made with allowed vegetable, ketchup and mustard.   Coffee, tea and carbonated drinks.   Plain cakes, cookies and pretzels.   Gelatin, plain puddings, custard, ice cream, sherbet and popsicles. Fats, Snack, Sweets, Condiments and Beverages:   Nuts, seeds and coconut.   Jam, marmalade and preserves.   Pickles, olives, relish and horseradish.   All desserts containing nuts, seeds, dried fruit and coconut; or made from whole grains or bran.   Candy made with nuts or seeds.   Popcorn.                     DIRECTIONS TO THE ENDOSCOPY DEPARTMENT     From the north ([facility])  Take [address] South, exit on [address] [address]. Get into the left hand jarrell, turn left (east), go one-half mile to [address] and turn left. Go north to the first stoplight, take a right on [address] and follow it to the Emergency entrance.    From the south ([facility])  Take [address]N to the [address]E split and exit on [address] [address]. On [address] [address], turn left (west) to [address]. Turn right (north) on [address]. Go north to the first stoplight, take a right on [address] and follow it to the Emergency entrance.    From the east via 35E ([facility])  Take [address] south to [address] [address] exit. Turn right on [address] . Go west to [address]. Turn right (north) on [address]. Go to the first stoplight, take a right and follow on [address] to the Emergency entrance.    From the east via [address] ([facility])  Take [address] to [address]. Turn left (south) on [address] to [address]. Turn left (east) on [address] and follow it to the Emergency entrance.    From the west via [address] ([place], [place])  Take [address] east to [address]. Turn right  (south) on [address] to [address]. Turn left (east) on [address] and follow it to the Emergency entrance.

## 2018-08-13 NOTE — IP AVS SNAPSHOT
MRN:[id number]                      After Visit Summary   8/13/2018    Louann Crocker    MRN: [id number]           Thank you!     Thank you for choosing [facility] for your care. Our goal is always to provide you with excellent care. Hearing back from our patients is one way we can continue to improve our services. Please take a few minutes to complete the written survey that you may receive in the mail after you visit. If you would like to speak to someone directly about your visit please contact Patient Relations at [phone]. Thank you!          Patient Information     Date Of Birth          1940        About your hospital stay     You were admitted on:  August 13, 2018 You last received care in the:  [facility]    You were discharged on:  August 13, 2018       Who to Call     For medical emergencies, please call 911.  For non-urgent questions about your medical care, please call your primary care provider or clinic, [phone]  For questions related to your surgery, please call your surgery clinic        Attending Provider     Provider Specialty    Link, Deng DOYLE MD Gastroenterology       Primary Care Provider Office Phone # Fax #    Luciano Dylan Fonseca [phone]      Your next 10 appointments already scheduled     Aug 21, 2018  2:30 PM CDT   [facility] Kan [facility]   [facility]    [address]  [address]  [facility] [id number] [phone]              Further instructions from your care team       The patient has received a copy of the Provation  report the doctor has written and discharge instructions have been discussed with the patient and responsible adult.  All questions were addressed and answered prior to patient discharge.    Pending Results     No orders found from 8/11/2018 to 8/14/2018.            Admission Information     Date & Time Provider  "Department Dept. Phone    8/13/2018 Link, Deng DOYLE MD [facility] [phone]      Your Vitals Were     Blood Pressure Respirations Height Weight Pulse Oximetry BMI (Body Mass Index)    133/78 16 1.6 m (5' 3\") 79.4 kg (175 lb) 95% 31 kg/m2      MyChart Information     [employer] gives you secure access to your electronic health record. If you see a primary care provider, you can also send messages to your care team and make appointments. If you have questions, please call your primary care clinic.  If you do not have a primary care provider, please call [phone] and they will assist you.        Care EveryWhere ID     This is your Care EveryWhere ID. This could be used by other organizations to access your [facility] medical records  [id number]        Equal Access to Services     DANI NULL : Jenni Pérez, genesis yoo, asmita almaraz, philipp dotson. So [facility] [phone].    ATENCIÓN: Si habla español, tiene a adam disposición servicios gratuitos de asistencia lingüística. Jose al [phone].    We comply with applicable federal civil rights laws and [place] laws. We do not discriminate on the basis of race, color, national origin, age, disability, sex, sexual orientation, or gender identity.               Review of your medicines      UNREVIEWED medicines. Ask your doctor about these medicines        Dose / Directions    B-12 1000 MCG Tbcr        Dose:  1000 mcg   Take 1,000 mcg by mouth daily   Quantity:  100 tablet   Refills:  1       calcium carbonate-vitamin D 600-400 MG-UNIT Chew        Dose:  1 chew tab   Take 1 chew tab by mouth   Refills:  0       cholecalciferol 5000 units Caps capsule   Commonly known as:  vitamin D3        Dose:  5000 Units   Take 5,000 Units by mouth daily   Refills:  0       DULoxetine HCl 40 MG Cpep        Dose:  40 mg   Take 40 mg by mouth daily   Refills:  0       flax seed oil 1000 MG capsule        " Dose:  1 capsule   Take 1 capsule by mouth daily   Refills:  0       fluticasone propionate 0.005 % ointment   Commonly known as:  CUTIVATE   Used for:  Rash        Dose:  1 g   Apply 1 g topically 2 times daily   Quantity:  1 Tube   Refills:  11       LYRICA PO        Dose:  25 mg   Take 25 mg by mouth daily   Refills:  0       Multi-vitamin Tabs tablet   Generic drug:  multivitamin, therapeutic with minerals        1 TABLET DAILY   Quantity:  30   Refills:  0       NAPROXEN PO        Dose:  220-440 mg   Take 220-440 mg by mouth 2 times daily as needed for moderate pain   Refills:  0       oxybutynin 5 MG tablet   Commonly known as:  DITROPAN   Used for:  Urge incontinence of urine        Dose:  5 mg   Take 1 tablet (5 mg) by mouth 2 times daily   Quantity:  180 tablet   Refills:  1       PRILOSEC PO        Dose:  20 mg   Take 20 mg by mouth every morning   Refills:  0       VITAMIN B6 PO        Dose:  100 mg   Take 100 mg by mouth daily   Refills:  0                Protect others around you: Learn how to safely use, store and throw away your medicines at www.disposemymeds.org.             Medication List: This is a list of all your medications and when to take them. Check marks below indicate your daily home schedule. Keep this list as a reference.      Medications           Morning Afternoon Evening Bedtime As Needed    B-12 1000 MCG Tbcr   Take 1,000 mcg by mouth daily                                calcium carbonate-vitamin D 600-400 MG-UNIT Chew   Take 1 chew tab by mouth                                cholecalciferol 5000 units Caps capsule   Commonly known as:  vitamin D3   Take 5,000 Units by mouth daily                                DULoxetine HCl 40 MG Cpep   Take 40 mg by mouth daily                                flax seed oil 1000 MG capsule   Take 1 capsule by mouth daily                                fluticasone propionate 0.005 % ointment   Commonly known as:  CUTIVATE   Apply 1 g topically 2  times daily                                LYRICA PO   Take 25 mg by mouth daily                                Multi-vitamin Tabs tablet   1 TABLET DAILY   Generic drug:  multivitamin, therapeutic with minerals                                NAPROXEN PO   Take 220-440 mg by mouth 2 times daily as needed for moderate pain                                oxybutynin 5 MG tablet   Commonly known as:  DITROPAN   Take 1 tablet (5 mg) by mouth 2 times daily                                PRILOSEC PO   Take 20 mg by mouth every morning                                VITAMIN B6 PO   Take 100 mg by mouth daily

## 2018-08-13 NOTE — H&P
Pre-Endoscopy History and Physical     Louann Crocker MRN# [id number]   YOB: 1940 Age: 77 year old     Date of Procedure: 8/13/2018  Primary care provider: Luciano Fonseca  Type of Endoscopy: colonoscopy  Reason for Procedure: cailine gonzales  Type of Anesthesia Anticipated: Conscious Sedation    HPI:    Louann is a 77 year old female who will be undergoing the above procedure.      A history and physical has been performed. The patient's medications and allergies have been reviewed. The risks and benefits of the procedure and the sedation options and risks were discussed with the patient.  All questions were answered and informed consent was obtained.      She denies a personal or family history of anesthesia complications or bleeding disorders.     Patient Active Problem List   Diagnosis     Esophageal reflux     Myalgia and myositis     Anomalous atrioventricular excitation     Calculus of kidney     IBS (irritable bowel syndrome)     Fibromyalgia     Anxiety     Major depressive disorder, single episode, severe (H)     GERD (gastroesophageal reflux disease)     Elevated blood sugar     Hypertriglyceridemia     Menopausal syndrome (hot flashes)     CARDIOVASCULAR SCREENING; LDL GOAL LESS THAN 160     Biliary colic     S/P spinal surgery     Spondylosis     Spinal stenosis     Sacroiliitis (H)     Conversion disorder     Delirium due to multiple etiologies     Encephalopathy     Urinary tract infection due to ESBL Klebsiella     Sepsis (H)     Status post lumbar surgery     Elevated blood pressure     Hyperglycemia     Sensorineural hearing loss, unilateral     Cervicalgia     Chronic bilateral low back pain with right-sided sciatica        Past Medical History:   Diagnosis Date     Anxiety      DDD (degenerative disc disease), cervical      DDD (degenerative disc disease), lumbar      Fibromyalgia      GERD (gastroesophageal reflux disease)      History of blood transfusion      IBS  (irritable bowel syndrome)      Major depressive disorder, single episode, severe, without mention of psychotic behavior 2005    ECT x 16        Past Surgical History:   Procedure Laterality Date     APPENDECTOMY       ARTHROSCOPY KNEE RT/LT  2007     BACK SURGERY  5/2013    Sacroileac Fusion     BACK SURGERY  10/6/2014    Lumbar Fusion L3-L-4, L-4L-5     C FUSION MC-P JOINT,SINGLE  2010     C FUSION SHOULDER JOINT  1/01    lap bone sput     C ULLOA W/O FACETEC FORAMOT/DSKC 1/2 VRT SEG, LUMBAR  1993     C LAP,BILIARY TRACT,UNLISTED  1966     C LAPAROSCOPY, SURGICAL; W/ VAGINAL HYSTERECTOMY W/WO REMOVAL OVARY(S)/TUBES  1997    Laparoscopy, Vag Hysterectomy     CATARACT IOL, RT/LT  2008     COLONOSCOPY       ENDOSCOPIC RETROGRADE CHOLANGIOPANCREATOGRAPHY  2005     HC DILATION/CURETTAGE DIAG/THER NON OB  1961    D & C     HC ERCP W SPHINCTEROTOMY  12/09    CBD stones     HC REMOVAL GALLBLADDER  1964    Cholecystectomy     HC REPAIR ROTATOR CUFF,ACUTE  3/90     SURGICAL HISTORY OF -   2005    cervical     SURGICAL HISTORY OF -   2005    lumbar      SURGICAL HISTORY OF -   2008    right knee replacement     VITRECTOMY PARSPLANA WITH 25 GAUGE SYSTEM  5/10/2012    Procedure:VITRECTOMY PARSPLANA WITH 25 GAUGE SYSTEM; LEFT EYE 25 GAUGE VITRECTOMY, MEMBRANE STRIPPING, AIR  FLUID EXCHANGE, GAS 15% C3F8; Surgeon:ANTONIETTA WALTERS; Location:[facility]       Relevant Family History: NONE    Relevant Social History: NONE     Prior to Admission medications    Medication Sig Start Date End Date Taking? Authorizing Provider   calcium carbonate-vitamin D 600-400 MG-UNIT CHEW Take 1 chew tab by mouth   Yes Reported, Patient   cholecalciferol (VITAMIN D3) 5000 UNITS CAPS capsule Take 5,000 Units by mouth daily   Yes Reported, Patient   Cyanocobalamin (B-12) 1000 MCG TBCR Take 1,000 mcg by mouth daily 5/26/18  Yes Luciano Fonseca MD   DULoxetine HCl 40 MG CPEP Take 40 mg by mouth daily 5/26/18  Yes Luciano Fonseca MD Flaxseed  "Linseed, (FLAX SEED OIL) 1000 MG capsule Take 1 capsule by mouth daily   Yes Reported, Patient   MULTI-VITAMIN OR TABS 1 TABLET DAILY 10/13/09  Yes Balgobin, Christopher Lennox Paul, MD   NAPROXEN PO Take 220-440 mg by mouth 2 times daily as needed for moderate pain   Yes Unknown, Entered By History   Omeprazole (PRILOSEC PO) Take 20 mg by mouth every morning   Yes Reported, Patient   oxybutynin (DITROPAN) 5 MG tablet Take 1 tablet (5 mg) by mouth 2 times daily 7/6/18  Yes Raudel Kan MD   Pregabalin (LYRICA PO) Take 25 mg by mouth daily   Yes Reported, Patient   Pyridoxine HCl (VITAMIN B6 PO) Take 100 mg by mouth daily   Yes Reported, Patient   fluticasone propionate (CUTIVATE) 0.005 % ointment Apply 1 g topically 2 times daily 9/12/17   Ramya Mccarthy DO       Allergies   Allergen Reactions     Adhesive Tape      Flagyl [Metronidazole Hcl]      Imidazole antifungals, HIVES & RASH     Nickel      rash        REVIEW OF SYSTEMS:   A relevant review of systems was performed and was negative    PHYSICAL EXAM:   Ht 1.6 m (5' 3\")  Wt 79.4 kg (175 lb)  BMI 31 kg/m2 Estimated body mass index is 31 kg/(m^2) as calculated from the following:    Height as of this encounter: 1.6 m (5' 3\").    Weight as of this encounter: 79.4 kg (175 lb).   GENERAL APPEARANCE: alert, and oriented  MENTAL STATUS: alert  AIRWAY EXAM: Normal  RESP: lungs clear to auscultation - no rales, rhonchi or wheezes  CV: regular rates and rhythm  DIAGNOSTICS:    Not indicated    IMPRESSION   ASA Class 2 - Mild systemic disease    PLAN:   Plan for colonoscopy. We discussed the risks, benefits and alternatives and the patient wished to proceed.      Signed Electronically by: Deng Monzon  August 13, 2018            "

## 2018-08-14 LAB — COPATH REPORT: NORMAL

## 2018-08-21 ENCOUNTER — OFFICE VISIT (OUTPATIENT)
Dept: OBGYN | Facility: CLINIC | Age: 78
End: 2018-08-21
Payer: MEDICARE

## 2018-08-21 VITALS — WEIGHT: 179 LBS | DIASTOLIC BLOOD PRESSURE: 80 MMHG | SYSTOLIC BLOOD PRESSURE: 126 MMHG | BODY MASS INDEX: 31.71 KG/M2

## 2018-08-21 DIAGNOSIS — Z86.59 HISTORY OF DEPRESSION: ICD-10-CM

## 2018-08-21 DIAGNOSIS — N32.81 URGENCY-FREQUENCY SYNDROME: Primary | ICD-10-CM

## 2018-08-21 PROCEDURE — 99213 OFFICE O/P EST LOW 20 MIN: CPT | Performed by: OBSTETRICS & GYNECOLOGY

## 2018-08-21 NOTE — MR AVS SNAPSHOT
After Visit Summary   8/21/2018    Louann Crocker    MRN: [id number]           Patient Information     Date Of Birth          1940        Visit Information        Provider Department      8/21/2018 2:30 PM Raudel Kan MD [facility]        Today's Diagnoses     Urgency-frequency syndrome    -  1    History of depression          Care Instructions    You can reach your [place] Care Team any time of the day by calling [phone]. This number will put you in touch with the 24 hour nurse line if the clinic is closed.    To contact your OB/GYN Station Coordinator/Surgery Scheduler please call [phone]. This is a direct number for your care team between 8 a.m. and 4 p.m. Monday through Friday.    [facility] is open for your convenience:  Monday through Friday 8 a.m. to 6 p.m.  Closed weekends and all major holidays.            Follow-ups after your visit        Follow-up notes from your care team     Return in about 2 months (around 10/21/2018).      Your next 10 appointments already scheduled     Oct 02, 2018  9:30 AM CDT   PHYSICAL with Ramya Mccarthy DO   [facility]    [address]  [address] [place] [address] [id number] [phone]            Oct 15, 2018  9:30 AM CDT   SHORT with Raudel Kan MD   [facility] ([facility]    [address]  [address] [place] [id number] [phone]              Who to contact     If you have questions or need follow up information about today's clinic visit or your schedule please contact [facility] directly at [phone].  Normal or non-critical lab and imaging results will be communicated to you by MyChart, letter or phone within 4 business days after the clinic has received the results. If you do not hear from us within 7 days, please contact the clinic through MyChart or phone. If you have  a critical or abnormal lab result, we will notify you by phone as soon as possible.  Submit refill requests through [employer] or call your pharmacy and they will forward the refill request to us. Please allow 3 business days for your refill to be completed.          Additional Information About Your Visit        [facility] Information     [employer] gives you secure access to your electronic health record. If you see a primary care provider, you can also send messages to your care team and make appointments. If you have questions, please call your primary care clinic.  If you do not have a primary care provider, please call [phone] and they will assist you.        Care EveryWhere ID     This is your Care EveryWhere ID. This could be used by other organizations to access your [place] medical records  [id number]        Your Vitals Were     BMI (Body Mass Index)                   31.71 kg/m2            Blood Pressure from Last 3 Encounters:   08/21/18 126/80   08/13/18 153/81   08/02/18 136/80    Weight from Last 3 Encounters:   08/21/18 179 lb (81.2 kg)   08/13/18 175 lb (79.4 kg)   08/02/18 175 lb (79.4 kg)              Today, you had the following     No orders found for display       Primary Care Provider Office Phone # Fax #    Luciano Fonseca [phone] [address] [facility] [address]        Equal Access to Services     DANI NULL AH: Hadii aad ku hadasho Soomaali, waaxda luqadaha, qaybta kaalmada rosalbada, philipp dotson. So [facility] [phone].    ATENCIÓN: Si habla español, tiene a adam disposición servicios gratuitos de asistencia lingüística. Jose al [phone].    We comply with applicable federal civil rights laws and [place] laws. We do not discriminate on the basis of race, color, national origin, age, disability, sex, sexual orientation, or gender identity.            Thank you!     Thank you for choosing [facility]  for  your care. Our goal is always to provide you with excellent care. Hearing back from our patients is one way we can continue to improve our services. Please take a few minutes to complete the written survey that you may receive in the mail after your visit with us. Thank you!             Your Updated Medication List - Protect others around you: Learn how to safely use, store and throw away your medicines at www.disposemymeds.org.          This list is accurate as of 8/21/18 11:59 PM.  Always use your most recent med list.                   Brand Name Dispense Instructions for use Diagnosis    B-12 1000 MCG Tbcr     100 tablet    Take 1,000 mcg by mouth daily        calcium carbonate-vitamin D 600-400 MG-UNIT Chew      Take 1 chew tab by mouth        cholecalciferol 5000 units Caps capsule    vitamin D3     Take 5,000 Units by mouth daily        DULoxetine HCl 40 MG Cpep      Take 40 mg by mouth daily        flax seed oil 1000 MG capsule      Take 1 capsule by mouth daily        fluticasone propionate 0.005 % ointment    CUTIVATE    1 Tube    Apply 1 g topically 2 times daily    Rash       LYRICA PO      Take 25 mg by mouth daily        Multi-vitamin Tabs tablet   Generic drug:  multivitamin, therapeutic with minerals     30    1 TABLET DAILY        NAPROXEN PO      Take 220-440 mg by mouth 2 times daily as needed for moderate pain        oxybutynin 5 MG tablet    DITROPAN    180 tablet    Take 1 tablet (5 mg) by mouth 2 times daily    Urge incontinence of urine       PRILOSEC PO      Take 20 mg by mouth every morning        VITAMIN B6 PO      Take 100 mg by mouth daily

## 2018-08-21 NOTE — NURSING NOTE
"Chief Complaint   Patient presents with     RECHECK     medication       Initial /80  Wt 179 lb (81.2 kg)  BMI 31.71 kg/m2 Estimated body mass index is 31.71 kg/(m^2) as calculated from the following:    Height as of 18: 5' 3\" (1.6 m).    Weight as of this encounter: 179 lb (81.2 kg).  BP completed using cuff size: regular        The following HM Due: NONE      The following patient reported/Care Every where data was sent to:  P ABSTRACT QUALITY INITIATIVES [07800]        Mirian Malloy, DEVON                 "

## 2018-08-22 NOTE — PROGRESS NOTES
The patient is a. 77 year old  white female  postmenopausal status post vaginal hysterectomy with BSO for menorrhagia in approximately  in her 50s, not on HRT whom I am asked to see by Dr. Anthony Fonseca for evaluation of an approximate 2 year history of urgency frequency urgency incontinence and involuntary urine loss.  Pregnancy history includes vaginal delivery ×2 the largest 8 lbs. 12 oz.,  Force[employer] instrumentation (patient states that her physician did deliveries back then) no,  known history of bladder or bowel injury. Patient complains of urinary leakage with getting up at night to go to the bathroom, occasional involuntary loss as well as an inability to stop her urine flow.  She denies significant symptoms to suggest stress incontinence.  She does not wear constant protection.  Denies any leakage with intercourse.  The leakage is becoming inconvenience in her life.  See the office visit notes from 2018 for full details  In reviewing the findings with the patient a decision was made to begin a trial of fluid management and antimuscarinic therapy with Detrol LA 4 mg once daily.  The patient's insurance would not pay for Detrol and insisted that she use oxybutynin.  The patient kept a voiding diary for 2 days prior to starting the medication and for 2 days since starting it and she is normocephalic improvement in her urgency frequency and incontinence.  Patient states that is only rare that she has any leakage.  The patient denies any significant increase in side effect symptoms such as dry mouth dry eyes or constipation  I also reviewed with her her past history of depression and the patient concern alert but came up and her chart.  The patient states that she has a long history of depression and mental health disorders is been through multiple medication regimens.  None of these regimens have provided only acceptable relief.  The patient went through TMS therapy and states that her symptoms have  dramatically improved.  At present she denies significant depressive symptoms denies being a danger to herself or to others is sleeping well and states that her depressive symptoms are under good control.  Emergency help numbers were reviewed   Current Outpatient Prescriptions   Medication     calcium carbonate-vitamin D 600-400 MG-UNIT CHEW     cholecalciferol (VITAMIN D3) 5000 UNITS CAPS capsule     Cyanocobalamin (B-12) 1000 MCG TBCR     DULoxetine HCl 40 MG CPEP     Flaxseed, Linseed, (FLAX SEED OIL) 1000 MG capsule     fluticasone propionate (CUTIVATE) 0.005 % ointment     MULTI-VITAMIN OR TABS     NAPROXEN PO     Omeprazole (PRILOSEC PO)     oxybutynin (DITROPAN) 5 MG tablet     Pregabalin (LYRICA PO)     Pyridoxine HCl (VITAMIN B6 PO)     No current facility-administered medications for this visit.          Past Medical History:   Diagnosis Date     Anxiety      DDD (degenerative disc disease), cervical      DDD (degenerative disc disease), lumbar      Fibromyalgia      GERD (gastroesophageal reflux disease)      History of blood transfusion      IBS (irritable bowel syndrome)      Major depressive disorder, single episode, severe, without mention of psychotic behavior 2005    ECT x 16     Past Surgical History:   Procedure Laterality Date     APPENDECTOMY       ARTHROSCOPY KNEE RT/LT  2007     BACK SURGERY  5/2013    Sacroileac Fusion     BACK SURGERY  10/6/2014    Lumbar Fusion L3-L-4, L-4L-5     C FUSION MC-P JOINT,SINGLE  2010     C FUSION SHOULDER JOINT  1/01    lap bone sput     C ULLOA W/O FACETEC FORAMOT/DSKC 1/2 VRT SEG, LUMBAR  1993     C LAP,BILIARY TRACT,UNLISTED  1966     C LAPAROSCOPY, SURGICAL; W/ VAGINAL HYSTERECTOMY W/WO REMOVAL OVARY(S)/TUBES  1997    Laparoscopy, Vag Hysterectomy     CATARACT IOL, RT/LT  2008     COLONOSCOPY       ENDOSCOPIC RETROGRADE CHOLANGIOPANCREATOGRAPHY  2005     HC DILATION/CURETTAGE DIAG/THER NON OB  1961    D & C     HC ERCP W SPHINCTEROTOMY  12/09    CBD stones      HC REMOVAL GALLBLADDER  1964    Cholecystectomy     HC REPAIR ROTATOR CUFF,ACUTE  3/90     SURGICAL HISTORY OF -   2005    cervical     SURGICAL HISTORY OF -   2005    lumbar      SURGICAL HISTORY OF -   2008    right knee replacement     VITRECTOMY PARSPLANA WITH 25 GAUGE SYSTEM  5/10/2012    Procedure:VITRECTOMY PARSPLANA WITH 25 GAUGE SYSTEM; LEFT EYE 25 GAUGE VITRECTOMY, MEMBRANE STRIPPING, AIR  FLUID EXCHANGE, GAS 15% C3F8; Surgeon:ANTONIETTA WALTERS; Location:[facility]      ROS: 10 point ROS neg other than the symptoms noted above in the HPI.  /80  Wt 179 lb (81.2 kg)  BMI 31.71 kg/m2  Constitutional: healthy, alert and no distress  Psychiatric: mentation appears normal and affect normal/bright    (N32.81) Urgency-frequency syndrome  (primary encounter diagnosis)  Comment: Patient symptoms are under acceptable control of oxybutynin 5 mg orally twice daily  Plan: The patient plans to winter in [place] and she will be leaving approximately October 15 and be back in May.  I have asked that she see me before she leaves and then again when she returns.  She will call for any concerns in the interval.  Detailed written plan was given    (Z86.59) History of depression  Comment: Presently well controlled  Plan: Patient follows with primary care and will call should concerns arise

## 2018-08-22 NOTE — PATIENT INSTRUCTIONS
You can reach your [place] Care Team any time of the day by calling [phone]. This number will put you in touch with the 24 hour nurse line if the clinic is closed.    To contact your OB/GYN Station Coordinator/Surgery Scheduler please call [phone]. This is a direct number for your care team between 8 a.m. and 4 p.m. Monday through Friday.    [facility] is open for your convenience:  Monday through Friday 8 a.m. to 6 p.m.  Closed weekends and all major holidays.

## 2018-09-01 ENCOUNTER — TRANSFERRED RECORDS (OUTPATIENT)
Dept: HEALTH INFORMATION MANAGEMENT | Facility: CLINIC | Age: 78
End: 2018-09-01

## 2018-09-14 PROBLEM — M54.2 CERVICALGIA: Status: RESOLVED | Noted: 2018-07-09 | Resolved: 2018-09-14

## 2018-09-14 PROBLEM — G89.29 CHRONIC BILATERAL LOW BACK PAIN WITH RIGHT-SIDED SCIATICA: Status: RESOLVED | Noted: 2018-07-09 | Resolved: 2018-09-14

## 2018-09-14 PROBLEM — M54.41 CHRONIC BILATERAL LOW BACK PAIN WITH RIGHT-SIDED SCIATICA: Status: RESOLVED | Noted: 2018-07-09 | Resolved: 2018-09-14

## 2018-09-14 NOTE — PROGRESS NOTES
Subjective:  HPI                    Objective:  System    Physical Exam    General     ROS    Assessment/Plan:    DISCHARGE REPORT    Progress reporting period is from 7/9/18 to 7/30/18.     SUBJECTIVE  Subjective: Sore the day after last treatment and also after exercises. She is seeing a neurologist the end of August. Able to ascend stairs reciprocally now.    Current Pain level:  5-6/10 neck and back  Initial Pain level: 7/10        ;   ,     Patient has failed to return to therapy so current objective findings are unknown.  The subjective and objective information are from the last SOAP note on this patient.    OBJECTIVE  Objective: Minimal L > R subcoccipital and B CPVM and UT tone. Fair to good posture with effort and fair to good scap control. Fair to good abdominal control and fair glut max control.       ASSESSMENT/PLAN  Updated problem list and treatment plan: Diagnosis 1:  Upper back/neck and low back pain  Pain -  home program  Decreased ROM/flexibility - home program  Decreased strength - home program  Impaired muscle performance - home program  Decreased function - home program  Impaired posture - home program  STG/LTGs have been met or progress has been made towards goals:  Yes (See Goal flow sheet completed today.)  Assessment of Progress: Patient has not returned to therapy.  Current status is unknown and discharge G code cannot be reported.  Self Management Plans:  Patient has been instructed in a home treatment program.    Louann continues to require the following intervention to meet STG and LTG's: PT intervention is no longer required to meet STG/LTG.  The patient failed to complete scheduled/ordered appointments so current information is unknown.  We will discharge this patient from PT.    Recommendations:      Please refer to the daily flowsheet for treatment today, total treatment time and time spent performing 1:1 timed codes.

## 2018-10-02 ENCOUNTER — OFFICE VISIT (OUTPATIENT)
Dept: OBGYN | Facility: CLINIC | Age: 78
End: 2018-10-02
Payer: MEDICARE

## 2018-10-02 VITALS
HEIGHT: 63 IN | BODY MASS INDEX: 31.73 KG/M2 | WEIGHT: 179.1 LBS | SYSTOLIC BLOOD PRESSURE: 128 MMHG | DIASTOLIC BLOOD PRESSURE: 82 MMHG

## 2018-10-02 DIAGNOSIS — Z01.419 ENCOUNTER FOR GYNECOLOGICAL EXAMINATION (GENERAL) (ROUTINE) WITHOUT ABNORMAL FINDINGS: ICD-10-CM

## 2018-10-02 DIAGNOSIS — R21 RASH: Primary | ICD-10-CM

## 2018-10-02 DIAGNOSIS — Z76.0 ENCOUNTER FOR MEDICATION REFILL: ICD-10-CM

## 2018-10-02 PROCEDURE — 99397 PER PM REEVAL EST PAT 65+ YR: CPT | Performed by: FAMILY MEDICINE

## 2018-10-02 RX ORDER — NYSTATIN 100000 [USP'U]/G
POWDER TOPICAL 3 TIMES DAILY PRN
Qty: 60 G | Refills: 11 | Status: SHIPPED | OUTPATIENT
Start: 2018-10-02 | End: 2020-04-22

## 2018-10-02 RX ORDER — NYSTATIN 100000 U/G
CREAM TOPICAL 3 TIMES DAILY
Qty: 15 G | Refills: 11 | Status: SHIPPED | OUTPATIENT
Start: 2018-10-02 | End: 2018-10-16

## 2018-10-02 RX ORDER — CLOTRIMAZOLE 1 %
CREAM (GRAM) TOPICAL 2 TIMES DAILY
Qty: 15 G | Refills: 11 | Status: SHIPPED | OUTPATIENT
Start: 2018-10-02 | End: 2020-04-22

## 2018-10-02 NOTE — NURSING NOTE
"Chief Complaint   Patient presents with     Gyn Exam     discuss medications--pt seeing Dr. Kan on 10/15/18--had normal mammo in [place]--pt will drop off records       Initial /82  Ht 5' 3\" (1.6 m)  Wt 179 lb 1.6 oz (81.2 kg)  BMI 31.73 kg/m2 Estimated body mass index is 31.73 kg/(m^2) as calculated from the following:    Height as of this encounter: 5' 3\" (1.6 m).    Weight as of this encounter: 179 lb 1.6 oz (81.2 kg).  BP completed using cuff size: regular        The following HM Due: NONE        "

## 2018-10-02 NOTE — PATIENT INSTRUCTIONS
Return yearly     Dr. Ramya Mccarthy, DO    Obstetrics and Gynecology  [facility] [place]

## 2018-10-02 NOTE — PROGRESS NOTES
SUBJECTIVE:  Louann Crocker is an 78 year old  postmenopausal woman   who presents for well woman annual gyn exam. Menopause at age 57. No   bleeding, spotting, or discharge noted.     Estrogen replacement therapy: none currently, has taken in past  ULICES exposure: no  History of abnormal Pap smear: No  Family history of uterine or ovarian cancer: No  Regular self breast exam: No  History of abnormal mammogram: No  Family history of breast cancer: No  History of abnormal lipids: Yes - Hypertriglyceridemia      - Pt is using a zinc cream to alleviate skin itching just under both breasts. She states the fluticasone stopped working, which is when she switched to OTC zinc cream; has not tried lotrimin. Pt also notices symptoms on inner thighs, feel it is due to chafing.        - Pt has discontinued her estradiol, is still experiencing hot flashes but was concerned about taking medication.    > Does still use vaginal estrogen as needed before intercourse    - Underwent TMS therapy last year, for treatment of her depression -- found this to work, after 17 different medications failed to treat her mood symptoms.       Past Medical History:   Diagnosis Date     Anxiety      DDD (degenerative disc disease), cervical      DDD (degenerative disc disease), lumbar      Fibromyalgia      GERD (gastroesophageal reflux disease)      History of blood transfusion      IBS (irritable bowel syndrome)      Major depressive disorder, single episode, severe, without mention of psychotic behavior     ECT x 16          Family History   Problem Relation Age of Onset     Arthritis Mother      fibromyalgia     Osteoporosis Mother      Cancer Mother      colon     Colon Cancer Mother      Hypertension Father      Cerebrovascular Disease Father      Substance Abuse Father      Substance Abuse Brother      Substance Abuse Son      Colon Cancer Son      Respiratory Brother      emphysema       Past Surgical History:   Procedure  Laterality Date     APPENDECTOMY       ARTHROSCOPY KNEE RT/LT  2007     BACK SURGERY  5/2013    Sacroileac Fusion     BACK SURGERY  10/6/2014    Lumbar Fusion L3-L-4, L-4L-5     C FUSION MC-P JOINT,SINGLE  2010     C FUSION SHOULDER JOINT  1/01    lap bone sput     C ULLOA W/O FACETEC FORAMOT/DSKC 1/2 VRT SEG, LUMBAR  1993     C LAP,BILIARY TRACT,UNLISTED  1966     C LAPAROSCOPY, SURGICAL; W/ VAGINAL HYSTERECTOMY W/WO REMOVAL OVARY(S)/TUBES  1997    Laparoscopy, Vag Hysterectomy     CATARACT IOL, RT/LT  2008     COLONOSCOPY       ENDOSCOPIC RETROGRADE CHOLANGIOPANCREATOGRAPHY  2005     HC DILATION/CURETTAGE DIAG/THER NON OB  1961    D & C     HC ERCP W SPHINCTEROTOMY  12/09    CBD stones     HC REMOVAL GALLBLADDER  1964    Cholecystectomy     HC REPAIR ROTATOR CUFF,ACUTE  3/90     SURGICAL HISTORY OF -   2005    cervical     SURGICAL HISTORY OF -   2005    lumbar      SURGICAL HISTORY OF -   2008    right knee replacement     VITRECTOMY PARSPLANA WITH 25 GAUGE SYSTEM  5/10/2012    Procedure:VITRECTOMY PARSPLANA WITH 25 GAUGE SYSTEM; LEFT EYE 25 GAUGE VITRECTOMY, MEMBRANE STRIPPING, AIR  FLUID EXCHANGE, GAS 15% C3F8; Surgeon:ANTONIETTA WALTERS; Location:[facility]       Current Outpatient Prescriptions   Medication     calcium carbonate-vitamin D 600-400 MG-UNIT CHEW     cholecalciferol (VITAMIN D3) 5000 UNITS CAPS capsule     clotrimazole (LOTRIMIN) 1 % cream     Cyanocobalamin (B-12) 1000 MCG TBCR     DULoxetine HCl 40 MG CPEP     Flaxseed, Linseed, (FLAX SEED OIL) 1000 MG capsule     fluticasone propionate (CUTIVATE) 0.005 % ointment     MULTI-VITAMIN OR TABS     NAPROXEN PO     nystatin (MYCOSTATIN) 176828 UNIT/GM POWD     nystatin (MYCOSTATIN) cream     Omeprazole (PRILOSEC PO)     oxybutynin (DITROPAN) 5 MG tablet     Pregabalin (LYRICA PO)     Pyridoxine HCl (VITAMIN B6 PO)     No current facility-administered medications for this visit.      Allergies   Allergen Reactions     Adhesive Tape      Flagyl [Metronidazole  "Hcl]      Imidazole antifungals, HIVES & RASH     Nickel      rash       Social History   Substance Use Topics     Smoking status: Former Smoker     Packs/day: 1.00     Years: 20.00     Types: Cigarettes     Quit date: 1/1/1977     Smokeless tobacco: Never Used     Alcohol use Yes      Comment: on occasion       Review Of Systems  Ears/Nose/Throat: negative  Respiratory: No shortness of breath, dyspnea on exertion, cough, or hemoptysis  Cardiovascular: negative  Gastrointestinal: negative  Genitourinary: negative  Constitutional, HEENT, cardiovascular, pulmonary, GI, , musculoskeletal, neuro, skin, endocrine and psych systems are negative, except as otherwise noted.      This document serves as a record of the services and decisions personally performed and made by Ramya Mccarthy DO. It was created on her behalf by Bianka Alvares, a trained medical scribe. The creation of this document is based the provider's statements to the medical scribe.  Scribe Bianka Alvares 9:42 AM, October 2, 2018    OBJECTIVE:  /82  Ht 1.6 m (5' 3\")  Wt 81.2 kg (179 lb 1.6 oz)  BMI 31.73 kg/m2  General appearance: healthy, alert and no distress  Skin: Skin color, texture, turgor normal. No rashes or lesions.  Ears: negative  Nose/Sinuses: Nares normal. Septum midline. Mucosa normal. No drainage or sinus tenderness.  Oropharynx: Lips, mucosa, and tongue normal. Teeth and gums normal.  Neck: Neck supple. No adenopathy. Thyroid symmetric, normal size,, Carotids without bruits.  Lungs: negative, Percussion normal. Good diaphragmatic excursion. Lungs clear  Heart: negative, PMI normal. No lifts, heaves, or thrills. RRR. No murmurs, clicks gallops or rub  Breasts: Inspection negative. No nipple discharge or bleeding. No masses.  Abdomen: Abdomen soft, non-tender. BS normal. No masses, organomegaly  Pelvic: Pelvic examination without pap/ Gonorrhea and Chlamydia   including  External genitalia normal   and vagina normal rugatted moderately " atrophic  Examination of urethra  normal no masses, tenderness, scarring  bladder, no masses or tenderness  Bimanual exam with no tenderness  Cervix/uterus surgically absent           ASSESSMENT:  Louann Crocker is an 78 year old  postmenopausal woman   who presents for annual gyn exam.     PLAN:  Dx: Annual gyn exam; Hot flushes; Skin irritation  1)  Labs completed with PCP; Pap smear not indicated due to Hysterectomy; Mammogram completed in [place]  2)  Hot flushes: Advised to try adding soy to diet, may relieve hot flushes   3)  Skin Irritation under breasts: Lotrimin rx given, will also continue zinc cream to alleviate itching under breasts   > nystatin given to alleviate thigh symptoms  4)  Return to clinic in 1 year for annual gyn exam, otherwise as needed.         Rx:  - Lotrimin 1% cream, Apply topically BID  - nystatin cream & powder, Apply topically TID as needed for itching/irritation      PE:  Reviewed health maintenance including diet, regular exercise,   estrogen replacement and periodic exams.        The information in this document, created by the medical scribe for me, accurately reflects the services I personally performed and the decisions made by me. I have reviewed and approved this document for accuracy prior to leaving the patient care area.  9:42 AM, 10/02/18    Dr. Ramya Mccarthy, DO    Obstetrics and Gynecology  [facility] [place] [facility] [place]

## 2018-10-02 NOTE — MR AVS SNAPSHOT
After Visit Summary   10/2/2018    Louann Crocker    MRN: [id number]           Patient Information     Date Of Birth          1940        Visit Information        Provider Department      10/2/2018 9:30 AM Ramya Mccarthy, DO [facility]        Today's Diagnoses     Rash    -  1      Care Instructions    Return yearly     Dr. Ramya Mccarthy, DO    Obstetrics and Gynecology  [facility] [place]                 Follow-ups after your visit        Your next 10 appointments already scheduled     Oct 15, 2018  9:30 AM CDT   [facility] with [facility]audel Kan [facility]   [facility]    [address]  [address]  [facility] [address] [phone]              Who to contact     If you have questions or need follow up information about today's clinic visit or your schedule please contact [facility] directly at [phone].  Normal or non-critical lab and imaging results will be communicated to you by MyChart, letter or phone within 4 business days after the clinic has received the results. If you do not hear from us within 7 days, please contact the clinic through [employer] or phone. If you have a critical or abnormal lab result, we will notify you by phone as soon as possible.  Submit refill requests through [email or web address] or call your pharmacy and they will forward the refill request to us. Please allow 3 business days for your refill to be completed.          Additional Information About Your Visit        MyChart Information     [email or web address] gives you secure access to your electronic health record. If you see a primary care provider, you can also send messages to your care team and make appointments. If you have questions, please call your primary care clinic.  If you do not have a primary care provider, please call [phone] and they will assist you.        Care EveryWhere ID     This is your Care  "EveryWhere ID. This could be used by other organizations to access your [facility] medical records  [id number]        Your Vitals Were     Height BMI (Body Mass Index)                5' 3\" (1.6 m) 31.73 kg/m2           Blood Pressure from Last 3 Encounters:   10/02/18 128/82   08/21/18 126/80   08/13/18 153/81    Weight from Last 3 Encounters:   10/02/18 179 lb 1.6 oz (81.2 kg)   08/21/18 179 lb (81.2 kg)   08/13/18 175 lb (79.4 kg)              Today, you had the following     No orders found for display         Today's Medication Changes          These changes are accurate as of 10/2/18  9:50 AM.  If you have any questions, ask your nurse or doctor.               Start taking these medicines.        Dose/Directions    clotrimazole 1 % cream   Commonly known as:  LOTRIMIN   Used for:  Rash   Started by:  Ramya Mccarthy, DO        Apply topically 2 times daily   Quantity:  15 g   Refills:  11       * nystatin cream   Commonly known as:  MYCOSTATIN   Used for:  Rash   Started by:  Ramya Mccarthy, DO        Apply topically 3 times daily for 14 days   Quantity:  15 g   Refills:  11       * nystatin 348048 UNIT/GM Powd   Commonly known as:  MYCOSTATIN   Used for:  Rash   Started by:  Ramya Mccarthy, DO        Apply topically 3 times daily as needed (itching)   Quantity:  60 g   Refills:  11       * Notice:  This list has 2 medication(s) that are the same as other medications prescribed for you. Read the directions carefully, and ask your doctor or other care provider to review them with you.         Where to get your medicines      These medications were sent to [facility] [address] [place], [place] - [address]  [address], [facility] [address]     Phone:  [phone]     clotrimazole 1 % cream    nystatin 938213 UNIT/GM Powd    nystatin cream                Primary Care Provider Office Phone # Fax #    Luciano Fonseca MD " [phone]       [address]  [facility] [address]        Equal Access to Services     DANI NULL : Hadii aad ku hadayush Pérez, genesis yoo, asmita kagumaro almaraz, philipp garciaswetha nila. So [facility] [phone].    ATENCIÓN: Si habla español, tiene a adam disposición servicios gratuitos de asistencia lingüística. Llame al [phone].    We comply with applicable federal civil rights laws and [place] laws. We do not discriminate on the basis of race, color, national origin, age, disability, sex, sexual orientation, or gender identity.            Thank you!     Thank you for choosing [facility]  for your care. Our goal is always to provide you with excellent care. Hearing back from our patients is one way we can continue to improve our services. Please take a few minutes to complete the written survey that you may receive in the mail after your visit with us. Thank you!             Your Updated Medication List - Protect others around you: Learn how to safely use, store and throw away your medicines at www.disposemymeds.org.          This list is accurate as of 10/2/18  9:50 AM.  Always use your most recent med list.                   Brand Name Dispense Instructions for use Diagnosis    B-12 1000 MCG Tbcr     100 tablet    Take 1,000 mcg by mouth daily        calcium carbonate-vitamin D 600-400 MG-UNIT Chew      Take 1 chew tab by mouth        cholecalciferol 5000 units Caps capsule    vitamin D3     Take 5,000 Units by mouth daily        clotrimazole 1 % cream    LOTRIMIN    15 g    Apply topically 2 times daily    Rash       DULoxetine HCl 40 MG Cpep      Take 40 mg by mouth daily        flax seed oil 1000 MG capsule      Take 1 capsule by mouth daily        fluticasone propionate 0.005 % ointment    CUTIVATE    1 Tube    Apply 1 g topically 2 times daily    Rash       LYRICA PO      Take 25 mg by mouth daily        Multi-vitamin Tabs tablet    Generic drug:  multivitamin, therapeutic with minerals     30    1 TABLET DAILY        NAPROXEN PO      Take 220-440 mg by mouth 2 times daily as needed for moderate pain        * nystatin cream    MYCOSTATIN    15 g    Apply topically 3 times daily for 14 days    Rash       * nystatin 699870 UNIT/GM Powd    MYCOSTATIN    60 g    Apply topically 3 times daily as needed (itching)    Rash       oxybutynin 5 MG tablet    DITROPAN    180 tablet    Take 1 tablet (5 mg) by mouth 2 times daily    Urge incontinence of urine       PRILOSEC PO      Take 20 mg by mouth every morning        VITAMIN B6 PO      Take 100 mg by mouth daily        * Notice:  This list has 2 medication(s) that are the same as other medications prescribed for you. Read the directions carefully, and ask your doctor or other care provider to review them with you.

## 2018-10-15 ENCOUNTER — OFFICE VISIT (OUTPATIENT)
Dept: OBGYN | Facility: CLINIC | Age: 78
End: 2018-10-15
Payer: MEDICARE

## 2018-10-15 VITALS — DIASTOLIC BLOOD PRESSURE: 82 MMHG | BODY MASS INDEX: 31.35 KG/M2 | SYSTOLIC BLOOD PRESSURE: 130 MMHG | WEIGHT: 177 LBS

## 2018-10-15 DIAGNOSIS — N39.41 URGE INCONTINENCE OF URINE: ICD-10-CM

## 2018-10-15 PROCEDURE — 99213 OFFICE O/P EST LOW 20 MIN: CPT | Performed by: OBSTETRICS & GYNECOLOGY

## 2018-10-15 RX ORDER — TOLTERODINE 4 MG/1
CAPSULE, EXTENDED RELEASE ORAL
Qty: 90 CAPSULE | Refills: 1 | Status: SHIPPED | OUTPATIENT
Start: 2018-10-15 | End: 2018-11-15

## 2018-10-15 RX ORDER — TOLTERODINE 4 MG/1
4 CAPSULE, EXTENDED RELEASE ORAL DAILY
Qty: 30 CAPSULE | Refills: 1 | Status: SHIPPED | OUTPATIENT
Start: 2018-10-15 | End: 2018-10-15

## 2018-10-15 NOTE — MR AVS SNAPSHOT
After Visit Summary   10/15/2018    Louann Crocker    MRN: [id number]           Patient Information     Date Of Birth          1940        Visit Information        Provider Department      10/15/2018 9:30 AM Raudel Kan MD [facility]        Today's Diagnoses     Urge incontinence of urine          Care Instructions    You can reach your [facility] Care Team any time of the day by calling [phone]. This number will put you in touch with the 24 hour nurse line if the clinic is closed.    To contact your OB/GYN Station Coordinator/Surgery Scheduler please call [phone]. This is a direct number for your care team between 8 a.m. and 4 p.m. Monday through Friday.    [facility] is open for your convenience:  Monday through Friday 8 a.m. to 6 p.m.  Closed weekends and all major holidays.            Follow-ups after your visit        Follow-up notes from your care team     Return in about 1 month (around 11/15/2018).      Who to contact     If you have questions or need follow up information about today's clinic visit or your schedule please contact [facility] directly at [phone].  Normal or non-critical lab and imaging results will be communicated to you by MyChart, letter or phone within 4 business days after the clinic has received the results. If you do not hear from us within 7 days, please contact the clinic through [employer] or phone. If you have a critical or abnormal lab result, we will notify you by phone as soon as possible.  Submit refill requests through MainOne or call your pharmacy and they will forward the refill request to us. Please allow 3 business days for your refill to be completed.          Additional Information About Your Visit        MyChart Information     MainOne gives you secure access to your electronic health record. If you see a primary care provider, you can also send messages to your care team and make  appointments. If you have questions, please call your primary care clinic.  If you do not have a primary care provider, please call [phone] and they will assist you.        Care EveryWhere ID     This is your Care EveryWhere ID. This could be used by other organizations to access your [facility] medical records  [id number]        Your Vitals Were     BMI (Body Mass Index)                   31.35 kg/m2            Blood Pressure from Last 3 Encounters:   10/15/18 130/82   10/02/18 128/82   08/21/18 126/80    Weight from Last 3 Encounters:   10/15/18 177 lb (80.3 kg)   10/02/18 179 lb 1.6 oz (81.2 kg)   08/21/18 179 lb (81.2 kg)              Today, you had the following     No orders found for display         Today's Medication Changes          These changes are accurate as of 10/15/18 11:59 PM.  If you have any questions, ask your nurse or doctor.               Start taking these medicines.        Dose/Directions    tolterodine 4 MG 24 hr capsule   Commonly known as:  DETROL LA   Used for:  Urge incontinence of urine   Started by:  Raudel Kan MD        TAKE ONE CAPSULE BY MOUTH DAILY   Quantity:  90 capsule   Refills:  1         Stop taking these medicines if you haven't already. Please contact your care team if you have questions.     oxybutynin 5 MG tablet   Commonly known as:  DITROPAN   Stopped by:  Raudel Kan MD                Where to get your medicines      These medications were sent to [facility] [id number] [place] [address] [id number] [place] [address] [address]  [address], [facility] [address]     Phone:  [phone]     tolterodine 4 MG 24 hr capsule                Primary Care Provider Office Phone # Fax #    Luciano Fonseca [phone] [address]  [facility] [address]        Equal Access to Services     DANI NULL AH: Jenni Pérez, waaxda luqadaha, qaybta kaaldarby almaraz, philipp caputo  terri yoderaaswetha ah. So [facility] [phone].    ATENCIÓN: Si douglasla melly, tiene a adam disposición servicios gratuitos de asistencia lingüística. Jose escobedo [phone].    We comply with applicable federal civil rights laws and [place] laws. We do not discriminate on the basis of race, color, national origin, age, disability, sex, sexual orientation, or gender identity.            Thank you!     Thank you for choosing [facility]  for your care. Our goal is always to provide you with excellent care. Hearing back from our patients is one way we can continue to improve our services. Please take a few minutes to complete the written survey that you may receive in the mail after your visit with us. Thank you!             Your Updated Medication List - Protect others around you: Learn how to safely use, store and throw away your medicines at www.disposemymeds.org.          This list is accurate as of 10/15/18 11:59 PM.  Always use your most recent med list.                   Brand Name Dispense Instructions for use Diagnosis    B-12 1000 MCG Tbcr     100 tablet    Take 1,000 mcg by mouth daily        calcium carbonate-vitamin D 600-400 MG-UNIT Chew      Take 1 chew tab by mouth        cholecalciferol 5000 units Caps capsule    vitamin D3     Take 5,000 Units by mouth daily        clotrimazole 1 % cream    LOTRIMIN    15 g    Apply topically 2 times daily    Rash       DULoxetine HCl 40 MG Cpep      Take 40 mg by mouth daily        flax seed oil 1000 MG capsule      Take 1 capsule by mouth daily        fluticasone propionate 0.005 % ointment    CUTIVATE    1 Tube    Apply 1 g topically 2 times daily    Rash       LYRICA PO      Take 25 mg by mouth daily        Multi-vitamin Tabs tablet   Generic drug:  multivitamin, therapeutic with minerals     30    1 TABLET DAILY        NAPROXEN PO      Take 220-440 mg by mouth 2 times daily as needed for moderate pain        * nystatin cream    MYCOSTATIN    15 g    Apply  topically 3 times daily for 14 days    Rash       * nystatin 684105 UNIT/GM Powd    MYCOSTATIN    60 g    Apply topically 3 times daily as needed (itching)    Rash       PRILOSEC PO      Take 20 mg by mouth every morning        tolterodine 4 MG 24 hr capsule    DETROL LA    90 capsule    TAKE ONE CAPSULE BY MOUTH DAILY    Urge incontinence of urine       VITAMIN B6 PO      Take 100 mg by mouth daily        * Notice:  This list has 2 medication(s) that are the same as other medications prescribed for you. Read the directions carefully, and ask your doctor or other care provider to review them with you.

## 2018-10-15 NOTE — TELEPHONE ENCOUNTER
Was filled at [facility] today.  pt requesting 90 day supply.  RX sent as requested  Tammi Carlson RN

## 2018-10-15 NOTE — NURSING NOTE
"Chief Complaint   Patient presents with     RECHECK       Initial /82  Wt 177 lb (80.3 kg)  BMI 31.35 kg/m2 Estimated body mass index is 31.35 kg/(m^2) as calculated from the following:    Height as of 10/2/18: 5' 3\" (1.6 m).    Weight as of this encounter: 177 lb (80.3 kg).  BP completed using cuff size: regular        The following HM Due: NONE      The following patient reported/Care Every where data was sent to:  P ABSTRACT QUALITY INITIATIVES [[id number]]        Mirian Malloy, [facility]                 "

## 2018-10-15 NOTE — PATIENT INSTRUCTIONS
You can reach your [facility] Care Team any time of the day by calling [phone]. This number will put you in touch with the 24 hour nurse line if the clinic is closed.    To contact your OB/GYN Station Coordinator/Surgery Scheduler please call [phone]. This is a direct number for your care team between 8 a.m. and 4 p.m. Monday through Friday.    [facility] is open for your convenience:  Monday through Friday 8 a.m. to 6 p.m.  Closed weekends and all major holidays.

## 2018-10-16 NOTE — PROGRESS NOTES
The patient is a. 77 year old  white female  postmenopausal status post vaginal hysterectomy with BSO for menorrhagia in approximately  in her 50s, not on HRT whom I am asked to see by Dr. Anthony Fonseca for evaluation of an approximate 2 year history of urgency frequency urgency incontinence and involuntary urine loss.  Pregnancy history includes vaginal delivery ×2 the largest 8 lbs. 12 oz.,  Forceps instrumentation (patient states that her physician did deliveries back then) no,  known history of bladder or bowel injury. Patient complains of urinary leakage with getting up at night to go to the bathroom, occasional involuntary loss as well as an inability to stop her urine flow.  She denies significant symptoms to suggest stress incontinence.  She does not wear constant protection.  Denies any leakage with intercourse.  The leakage is becoming inconvenience in her life.  See the office visit notes from 2018 and 2018 for full details  In reviewing the findings with the patient a decision was made to begin a trial of fluid management and antimuscarinic therapy with Detrol LA 4 mg once daily.  The patient's insurance would not pay for Detrol and insisted that she use oxybutynin.    She has used oxybutynin and while it has helped with her urinary symptoms the patient states that the side effects are intolerable she states that her eyes are very dry that she is very constipated despite laxative program and she is very thirsty.  The patient is asking if there is any other alternatives that could be used.  She denies any infectious symptoms.  The patient reyna in [place] is asking if there is any other alternatives that she could use    Past Medical History:   Diagnosis Date     Anxiety      DDD (degenerative disc disease), cervical      DDD (degenerative disc disease), lumbar      Fibromyalgia      GERD (gastroesophageal reflux disease)      History of blood transfusion      IBS (irritable bowel  syndrome)      Major depressive disorder, single episode, severe, without mention of psychotic behavior 2005    ECT x 16     Past Surgical History:   Procedure Laterality Date     APPENDECTOMY       ARTHROSCOPY KNEE RT/LT  2007     BACK SURGERY  5/2013    Sacroileac Fusion     BACK SURGERY  10/6/2014    Lumbar Fusion L3-L-4, L-4L-5     C FUSION MC-P JOINT,SINGLE  2010     C FUSION SHOULDER JOINT  1/01    lap bone sput     C ULLOA W/O FACETEC FORAMOT/DSKC 1/2 VRT SEG, LUMBAR  1993     C LAP,BILIARY TRACT,UNLISTED  1966     C LAPAROSCOPY, SURGICAL; W/ VAGINAL HYSTERECTOMY W/WO REMOVAL OVARY(S)/TUBES  1997    Laparoscopy, Vag Hysterectomy     CATARACT IOL, RT/LT  2008     COLONOSCOPY       ENDOSCOPIC RETROGRADE CHOLANGIOPANCREATOGRAPHY  2005     HC DILATION/CURETTAGE DIAG/THER NON OB  1961    D & C     HC ERCP W SPHINCTEROTOMY  12/09    CBD stones     HC REMOVAL GALLBLADDER  1964    Cholecystectomy     HC REPAIR ROTATOR CUFF,ACUTE  3/90     SURGICAL HISTORY OF -   2005    cervical     SURGICAL HISTORY OF -   2005    lumbar      SURGICAL HISTORY OF -   2008    right knee replacement     VITRECTOMY PARSPLANA WITH 25 GAUGE SYSTEM  5/10/2012    Procedure:VITRECTOMY PARSPLANA WITH 25 GAUGE SYSTEM; LEFT EYE 25 GAUGE VITRECTOMY, MEMBRANE STRIPPING, AIR  FLUID EXCHANGE, GAS 15% C3F8; Surgeon:ANTONIETTA WALTERS; Location: [facility]      ROS: 10 point ROS neg other than the symptoms noted above in the HPI.  /82  Wt 177 lb (80.3 kg)  BMI 31.35 kg/m2  Constitutional: healthy, alert and no distress    (N39.41) Urge incontinence of urine  Comment: Risks, benefits, and alternative modes of therapy discussed at length. Pathophysiology of the disease process reviewed, all of the patients questions answered and informed consent obtained.  I reviewed the side effect profile with oxybutynin and we discussed using we discussed fluid management and the role of bladder irritants  Plan: DISCONTINUED: tolterodine (DETROL LA) 4 MG 24         hr  capsule        Detrol LA 4 mg daily as an alternative.  The patient will try this and let me know how it works.  She will keep a symptom diary and call for any concerns  15 min spent w > 50% in counseling

## 2018-11-13 ENCOUNTER — TELEPHONE (OUTPATIENT)
Dept: OBGYN | Facility: CLINIC | Age: 78
End: 2018-11-13

## 2018-11-13 DIAGNOSIS — N39.41 URGE INCONTINENCE OF URINE: ICD-10-CM

## 2018-11-13 NOTE — TELEPHONE ENCOUNTER
Pt calling to let md know how she is doing after starting the Detrol.   Pt states that she is having more urinary leakage since starting the Detrol.     Pt is currently in [place].     Please advise.     Smita CAMP RN

## 2018-11-15 RX ORDER — TOLTERODINE 4 MG/1
4 CAPSULE, EXTENDED RELEASE ORAL DAILY
Qty: 90 CAPSULE | Refills: 0 | Status: SHIPPED | OUTPATIENT
Start: 2018-11-15 | End: 2019-03-17

## 2018-11-15 NOTE — TELEPHONE ENCOUNTER
Pt was notified of Dr Kan's recommendations. She also stated the pharmacy only gave her 30 capsules with 1 refill and that she needs more. 90 day supply sent. She will let us know if things don't improve.      Marysol Miranda RN

## 2018-11-15 NOTE — TELEPHONE ENCOUNTER
In an effort to help improve her bladder control the patient can void every 2 hours while awake.  Often keeping urine volume small will help improve with leakage as well as urgency frequency.  I believe the frequent timed voidings in combination with the Detrol should provide good relief.  If her symptoms are not improved certainly she can stop the Detrol and I can see her when she returns from [place]    Thank you

## 2019-03-17 DIAGNOSIS — N39.41 URGE INCONTINENCE OF URINE: ICD-10-CM

## 2019-03-18 RX ORDER — TOLTERODINE 4 MG/1
CAPSULE, EXTENDED RELEASE ORAL
Qty: 90 CAPSULE | Refills: 0 | Status: SHIPPED | OUTPATIENT
Start: 2019-03-18 | End: 2019-06-22

## 2019-03-18 NOTE — TELEPHONE ENCOUNTER
"Requested Prescriptions   Pending Prescriptions Disp Refills     tolterodine ER (DETROL LA) 4 MG 24 hr capsule [Pharmacy Med Name: TOLTERODINE TART ER 4MG CAPSULES] 90 capsule 0     Sig: TAKE 1 CAPSULE BY MOUTH ONCE DAILY    Muscarinic Antagonists (Urinary Incontinence Agents) Passed - 3/17/2019  9:04 AM       Passed - Recent (12 mo) or future (30 days) visit within the authorizing provider's specialty    Patient had office visit in the last 12 months or has a visit in the next 30 days with authorizing provider or within the authorizing provider's specialty.  See \"Patient Info\" tab in inbasket, or \"Choose Columns\" in Meds & Orders section of the refill encounter.             Passed - Patient does not have a diagnosis of glaucoma on the problem list    If glaucoma diagnosis is new, refer refill to physician.         Passed - Medication is active on med list       Passed - Patient is 18 years of age or older        rx approved per [facility] protocol.      Marysol Miranda RN    "

## 2019-06-11 ENCOUNTER — TRANSFERRED RECORDS (OUTPATIENT)
Dept: HEALTH INFORMATION MANAGEMENT | Facility: CLINIC | Age: 79
End: 2019-06-11

## 2019-06-22 DIAGNOSIS — N39.41 URGE INCONTINENCE OF URINE: ICD-10-CM

## 2019-06-24 RX ORDER — TOLTERODINE 4 MG/1
CAPSULE, EXTENDED RELEASE ORAL
Qty: 90 CAPSULE | Refills: 1 | Status: SHIPPED | OUTPATIENT
Start: 2019-06-24 | End: 2019-12-16

## 2019-07-09 ENCOUNTER — TRANSFERRED RECORDS (OUTPATIENT)
Dept: HEALTH INFORMATION MANAGEMENT | Facility: CLINIC | Age: 79
End: 2019-07-09

## 2019-08-26 ENCOUNTER — TRANSFERRED RECORDS (OUTPATIENT)
Dept: HEALTH INFORMATION MANAGEMENT | Facility: CLINIC | Age: 79
End: 2019-08-26

## 2019-09-09 ENCOUNTER — OFFICE VISIT (OUTPATIENT)
Dept: FAMILY MEDICINE | Facility: CLINIC | Age: 79
End: 2019-09-09
Payer: MEDICARE

## 2019-09-09 VITALS
DIASTOLIC BLOOD PRESSURE: 88 MMHG | OXYGEN SATURATION: 92 % | WEIGHT: 174.6 LBS | SYSTOLIC BLOOD PRESSURE: 138 MMHG | TEMPERATURE: 97.8 F | BODY MASS INDEX: 30.93 KG/M2 | RESPIRATION RATE: 14 BRPM | HEART RATE: 85 BPM

## 2019-09-09 DIAGNOSIS — M79.641 BILATERAL HAND PAIN: ICD-10-CM

## 2019-09-09 DIAGNOSIS — G89.29 CHRONIC RIGHT SHOULDER PAIN: Primary | ICD-10-CM

## 2019-09-09 DIAGNOSIS — M25.511 CHRONIC RIGHT SHOULDER PAIN: Primary | ICD-10-CM

## 2019-09-09 DIAGNOSIS — M79.642 BILATERAL HAND PAIN: ICD-10-CM

## 2019-09-09 PROCEDURE — 99213 OFFICE O/P EST LOW 20 MIN: CPT | Performed by: FAMILY MEDICINE

## 2019-09-09 NOTE — PROGRESS NOTES
Subjective     Louann Crocker is a 78 year old female who presents to clinic today for the following health issues: she's had widespread osteoarthritis issues with prior neck surgery and both shoulder treated in [place].   Past Medical History:   Diagnosis Date     Anxiety      DDD (degenerative disc disease), cervical      DDD (degenerative disc disease), lumbar      Fibromyalgia      GERD (gastroesophageal reflux disease)      History of blood transfusion      IBS (irritable bowel syndrome)      Major depressive disorder, single episode, severe, without mention of psychotic behavior 2005    ECT x 16       Past Surgical History:   Procedure Laterality Date     APPENDECTOMY       ARTHROSCOPY KNEE RT/LT  2007     BACK SURGERY  5/2013    Sacroileac Fusion     BACK SURGERY  10/6/2014    Lumbar Fusion L3-L-4, L-4L-5     C FUSION MC-P JOINT,SINGLE  2010     C FUSION SHOULDER JOINT  1/01    lap bone sput     C ULLOA W/O FACETEC FORAMOT/DSKC 1/2 VRT SEG, LUMBAR  1993     C LAP,BILIARY TRACT,UNLISTED  1966     C LAPAROSCOPY, SURGICAL; W/ VAGINAL HYSTERECTOMY W/WO REMOVAL OVARY(S)/TUBES  1997    Laparoscopy, Vag Hysterectomy     CATARACT IOL, RT/LT  2008     COLONOSCOPY       ENDOSCOPIC RETROGRADE CHOLANGIOPANCREATOGRAPHY  2005     HC DILATION/CURETTAGE DIAG/THER NON OB  1961    D & C     HC ERCP W SPHINCTEROTOMY  12/09    CBD stones     HC REMOVAL GALLBLADDER  1964    Cholecystectomy     HC REPAIR ROTATOR CUFF,ACUTE  3/90     SURGICAL HISTORY OF -   2005    cervical     SURGICAL HISTORY OF -   2005    lumbar      SURGICAL HISTORY OF -   2008    right knee replacement     VITRECTOMY PARSPLANA WITH 25 GAUGE SYSTEM  5/10/2012    Procedure:VITRECTOMY PARSPLANA WITH 25 GAUGE SYSTEM; LEFT EYE 25 GAUGE VITRECTOMY, MEMBRANE STRIPPING, AIR  FLUID EXCHANGE, GAS 15% C3F8; Surgeon:ANTONIETTA WALTERS; Location:[facility]       Family History   Problem Relation Age of Onset     Arthritis Mother         fibromyalgia     Osteoporosis Mother       Cancer Mother         colon     Colon Cancer Mother      Hypertension Father      Cerebrovascular Disease Father      Substance Abuse Father      Substance Abuse Brother      Substance Abuse Son      Colon Cancer Son      Respiratory Brother         emphysema       Social History     Tobacco Use     Smoking status: Former Smoker     Packs/day: 1.00     Years: 20.00     Pack years: 20.00     Types: Cigarettes     Last attempt to quit: 1977     Years since quittin.7     Smokeless tobacco: Never Used   Substance Use Topics     Alcohol use: Yes     Comment: on occasion         Arthritis     History of Present Illness        She eats 2-3 servings of fruits and vegetables daily.She consumes 1 sweetened beverage(s) daily.  She is taking medications regularly.     Musculoskeletal problem/pain      Duration: 2019    Description  Location: sharp pain in right rotator cuff and also starting to feel it in the left rotator cuff    Intensity:  severe    Accompanying signs and symptoms: none    History  Previous similar problem: YES- has had rotator cuff surgery on both the left and right side  Previous evaluation:  x-ray and ultrasound    Precipitating or alleviating factors:  Trauma or overuse: YES- overuse   Aggravating factors include: moving the arms in a backwards motion    Therapies tried and outcome: Naproxen and Lyrica     Patient is also having issues with the arthritis in her hands which is getting worse.      BP Readings from Last 3 Encounters:   19 138/88   10/15/18 130/82   10/02/18 128/82    Wt Readings from Last 3 Encounters:   19 79.2 kg (174 lb 9.6 oz)   10/15/18 80.3 kg (177 lb)   10/02/18 81.2 kg (179 lb 1.6 oz)                    Reviewed and updated as needed this visit by Provider         Review of Systems   Musculoskeletal: Positive for arthritis.      ROS COMP: Constitutional, HEENT, cardiovascular, pulmonary, GI, , musculoskeletal, neuro, skin, endocrine and psych systems  are negative, except as otherwise noted.      Objective    Physical Exam /88 (BP Location: Right arm, Patient Position: Chair, Cuff Size: Adult Large)   Pulse 85   Temp 97.8  F (36.6  C) (Oral)   Resp 14   Wt 79.2 kg (174 lb 9.6 oz)   SpO2 92%   BMI 30.93 kg/m      GENERAL: healthy, alert and no distress  EYES: Eyes grossly normal to inspection, PERRL and conjunctivae and sclerae normal  HENT: ear canals and TM's normal, nose and mouth without ulcers or lesions  RESP: lungs clear to auscultation - no rales, rhonchi or wheezes  CV: regular rate and rhythm, normal S1 S2, no S3 or S4, no murmur, click or rub, no peripheral edema and peripheral pulses strong  ABDOMEN: soft, nontender, no hepatosplenomegaly, no masses and bowel sounds normal  MS: no gross musculoskeletal defects noted, no edema  SKIN: no suspicious lesions or rashes  NEURO: Normal strength and tone, mentation intact and speech normal  PSYCH: mentation appears normal, affect normal/bright    Diagnostic Test Results:  Labs reviewed in Epic  Right shoulder, less so the left shoulder and pain in First MCP of right hand (dominent) more then the left from oa   (M25.511,  G89.29) Chronic right shoulder pain  (primary encounter diagnosis)  Comment:   Plan: ORTHO  REFERRAL        Past surgery over 10 years     (M79.641,  M79.642) Bilateral hand pain  Comment: first MCP, had one injection  Plan: ORTHO  REFERRAL

## 2019-09-11 ENCOUNTER — OFFICE VISIT (OUTPATIENT)
Dept: ORTHOPEDICS | Facility: CLINIC | Age: 79
End: 2019-09-11
Payer: MEDICARE

## 2019-09-11 ENCOUNTER — ANCILLARY PROCEDURE (OUTPATIENT)
Dept: GENERAL RADIOLOGY | Facility: CLINIC | Age: 79
End: 2019-09-11
Attending: ORTHOPAEDIC SURGERY
Payer: MEDICARE

## 2019-09-11 VITALS — SYSTOLIC BLOOD PRESSURE: 138 MMHG | DIASTOLIC BLOOD PRESSURE: 70 MMHG

## 2019-09-11 DIAGNOSIS — G89.29 CHRONIC HAND PAIN, RIGHT: ICD-10-CM

## 2019-09-11 DIAGNOSIS — M79.7 FIBROMYALGIA: ICD-10-CM

## 2019-09-11 DIAGNOSIS — Z98.1 HISTORY OF FUSION OF CERVICAL SPINE: ICD-10-CM

## 2019-09-11 DIAGNOSIS — M25.511 CHRONIC PAIN IN RIGHT SHOULDER: ICD-10-CM

## 2019-09-11 DIAGNOSIS — G89.29 CHRONIC PAIN IN RIGHT SHOULDER: ICD-10-CM

## 2019-09-11 DIAGNOSIS — M25.511 CHRONIC PAIN IN RIGHT SHOULDER: Primary | ICD-10-CM

## 2019-09-11 DIAGNOSIS — M79.641 CHRONIC HAND PAIN, RIGHT: ICD-10-CM

## 2019-09-11 DIAGNOSIS — G89.29 CHRONIC PAIN IN RIGHT SHOULDER: Primary | ICD-10-CM

## 2019-09-11 DIAGNOSIS — M19.049 HAND ARTHRITIS: ICD-10-CM

## 2019-09-11 PROCEDURE — 73130 X-RAY EXAM OF HAND: CPT | Mod: RT | Performed by: ORTHOPAEDIC SURGERY

## 2019-09-11 PROCEDURE — 73030 X-RAY EXAM OF SHOULDER: CPT | Mod: RT | Performed by: ORTHOPAEDIC SURGERY

## 2019-09-11 PROCEDURE — 99203 OFFICE O/P NEW LOW 30 MIN: CPT | Mod: 25 | Performed by: ORTHOPAEDIC SURGERY

## 2019-09-11 PROCEDURE — 20600 DRAIN/INJ JOINT/BURSA W/O US: CPT | Mod: RT | Performed by: ORTHOPAEDIC SURGERY

## 2019-09-11 RX ORDER — TESTOSTERONE CYPIONATE 200 MG/ML
1 INJECTION INTRAMUSCULAR
Status: DISCONTINUED | OUTPATIENT
Start: 2019-09-11 | End: 2020-04-22

## 2019-09-11 RX ORDER — LIDOCAINE HYDROCHLORIDE 10 MG/ML
1 INJECTION, SOLUTION INFILTRATION; PERINEURAL
Status: DISCONTINUED | OUTPATIENT
Start: 2019-09-11 | End: 2020-04-22

## 2019-09-11 RX ADMIN — TESTOSTERONE CYPIONATE 1 ML: 200 INJECTION INTRAMUSCULAR at 11:43

## 2019-09-11 RX ADMIN — LIDOCAINE HYDROCHLORIDE 1 ML: 10 INJECTION, SOLUTION INFILTRATION; PERINEURAL at 11:43

## 2019-09-11 NOTE — LETTER
9/11/2019         RE: Louann Crocker  [address]  [place] [place] [facility] [address]        Dear Colleague,    Thank you for referring your patient, Louann Crocker, to the [facility]TH[facility]. Please see a copy of my visit note below.    HISTORY OF PRESENT ILLNESS:    Louann Crocker is a 79 year old female who is seen in consultation at the request of Dr. Mcgrath for right hand pain. Patient reports intermittent numbness and tingling of the right hand, she states that her pain has been more constant over the last 4 months.  Patient also reports bilateral shoulder pain, with increased pain in the right shoulder. Progressively worsening of shoulder pain over the last 2-3 months. Patient denies any trauma or injury to the upper extremity. Patient is accompanied by her  today.     Present symptoms: Right hand pain, tingling in the right thumb, and tingling progressing into the index, long, and ring finger. Patient reports pain at the base of the thumb, along the radial aspect of the wrist. She notes decreased strength in the hand.   Patient reports worsening of right shoulder pain over the last couple months. Patient reports anterior shoulder pain. Pain is throbbing at rest, and will increase with reaching behind the back, raising the arm overhead, and laterally. Increased pain with holding the arm at shoulder height.  Weakness after use of the arm due to pain. Patient states that at nighttime the shoulder will become, and cause her to toss and turn.    Treatments tried to this point: no treatment for hand pain, patient reports previous duputryns contracture injection.  Heating of shoulder and hand. Naproxen,  Lyrica 25mg BID.   Orthopedic PMH: bilateral RCR in [place] [id number], DEC/DCR left  2001 cervical fusion C5-6 in 2005.     Past Medical History:   Diagnosis Date     Anxiety      DDD (degenerative disc disease), cervical      DDD (degenerative disc disease), lumbar       Fibromyalgia      GERD (gastroesophageal reflux disease)      History of blood transfusion      IBS (irritable bowel syndrome)      Major depressive disorder, single episode, severe, without mention of psychotic behavior 2005    ECT x 16       Past Surgical History:   Procedure Laterality Date     APPENDECTOMY       ARTHROSCOPY KNEE RT/LT  2007     BACK SURGERY  5/2013    Sacroileac Fusion     BACK SURGERY  10/6/2014    Lumbar Fusion L3-L-4, L-4L-5     C FUSION MC-P JOINT,SINGLE  2010     C FUSION SHOULDER JOINT  1/01    lap bone sput     C ULLOA W/O FACETEC FORAMOT/DSKC 1/2 VRT SEG, LUMBAR  1993     C LAP,BILIARY TRACT,UNLISTED  1966     C LAPAROSCOPY, SURGICAL; W/ VAGINAL HYSTERECTOMY W/WO REMOVAL OVARY(S)/TUBES  1997    Laparoscopy, Vag Hysterectomy     CATARACT IOL, RT/LT  2008     COLONOSCOPY       ENDOSCOPIC RETROGRADE CHOLANGIOPANCREATOGRAPHY  2005     HC DILATION/CURETTAGE DIAG/THER NON OB  1961    D & C     HC ERCP W SPHINCTEROTOMY  12/09    CBD stones     HC REMOVAL GALLBLADDER  1964    Cholecystectomy     HC REPAIR ROTATOR CUFF,ACUTE  3/90     SURGICAL HISTORY OF -   2005    cervical     SURGICAL HISTORY OF -   2005    lumbar      SURGICAL HISTORY OF -   2008    right knee replacement     VITRECTOMY PARSPLANA WITH 25 GAUGE SYSTEM  5/10/2012    Procedure:VITRECTOMY PARSPLANA WITH 25 GAUGE SYSTEM; LEFT EYE 25 GAUGE VITRECTOMY, MEMBRANE STRIPPING, AIR  FLUID EXCHANGE, GAS 15% C3F8; Surgeon:ANTONIETTA WALTERS; Location:[facility]       Family History   Problem Relation Age of Onset     Arthritis Mother         fibromyalgia     Osteoporosis Mother      Cancer Mother         colon     Colon Cancer Mother      Hypertension Father      Cerebrovascular Disease Father      Substance Abuse Father      Substance Abuse Brother      Substance Abuse Son      Colon Cancer Son      Respiratory Brother         emphysema       Social History     Socioeconomic History     Marital status:      Spouse name: Amador     Number of  children: 4     Years of education: Not on file     Highest education level: Not on file   Occupational History     Employer: RETIRED     Comment: self employed   Social Needs     Financial resource strain: Not on file     Food insecurity:     Worry: Not on file     Inability: Not on file     Transportation needs:     Medical: Not on file     Non-medical: Not on file   Tobacco Use     Smoking status: Former Smoker     Packs/day: 1.00     Years: 20.00     Pack years: 20.00     Types: Cigarettes     Last attempt to quit: 1977     Years since quittin.7     Smokeless tobacco: Never Used   Substance and Sexual Activity     Alcohol use: Yes     Comment: on occasion     Drug use: No     Sexual activity: Yes     Partners: Male     Birth control/protection: Surgical   Lifestyle     Physical activity:     Days per week: Not on file     Minutes per session: Not on file     Stress: Not on file   Relationships     Social connections:     Talks on phone: Not on file     Gets together: Not on file     Attends Jehovah's witness service: Not on file     Active member of club or organization: Not on file     Attends meetings of clubs or organizations: Not on file     Relationship status: Not on file     Intimate partner violence:     Fear of current or ex partner: Not on file     Emotionally abused: Not on file     Physically abused: Not on file     Forced sexual activity: Not on file   Other Topics Concern     Parent/sibling w/ CABG, MI or angioplasty before 65F 55M? Not Asked   Social History Narrative     Not on file       Current Outpatient Medications   Medication Sig Dispense Refill     calcium carbonate-vitamin D 600-400 MG-UNIT CHEW Take 1 chew tab by mouth       cholecalciferol (VITAMIN D3) 5000 UNITS CAPS capsule Take 5,000 Units by mouth daily       clotrimazole (LOTRIMIN) 1 % cream Apply topically 2 times daily 15 g 11     Cyanocobalamin (B-12) 1000 MCG TBCR Take 1,000 mcg by mouth daily 100 tablet 1     DULoxetine HCl  40 MG CPEP Take 40 mg by mouth daily       Flaxseed, Linseed, (FLAX SEED OIL) 1000 MG capsule Take 1 capsule by mouth daily       fluticasone propionate (CUTIVATE) 0.005 % ointment Apply 1 g topically 2 times daily 1 Tube 11     MULTI-VITAMIN OR TABS 1 TABLET DAILY 30 0     NAPROXEN PO Take 220-440 mg by mouth 2 times daily as needed for moderate pain       nystatin (MYCOSTATIN) 567742 UNIT/GM POWD Apply topically 3 times daily as needed (itching) 60 g 11     Omeprazole (PRILOSEC PO) Take 20 mg by mouth every morning       Pregabalin (LYRICA PO) Take 25 mg by mouth daily       Pyridoxine HCl (VITAMIN B6 PO) Take 100 mg by mouth daily       tolterodine ER (DETROL LA) 4 MG 24 hr capsule TAKE 1 CAPSULE BY MOUTH ONCE DAILY 90 capsule 1       Allergies   Allergen Reactions     Adhesive Tape      Flagyl [Metronidazole Hcl]      Imidazole antifungals, HIVES & RASH     Nickel      rash       REVIEW OF SYSTEMS:  CONSTITUTIONAL:  NEGATIVE for fever, chills, change in weight  INTEGUMENTARY/SKIN:  NEGATIVE for worrisome rashes, moles or lesions  EYES:  NEGATIVE for vision changes or irritation  ENT/MOUTH:  NEGATIVE for ear, mouth and throat problems  RESP:  NEGATIVE for significant cough or SOB  BREAST:  NEGATIVE for masses, tenderness or discharge  CV:  NEGATIVE for chest pain, palpitations or peripheral edema  GI:  NEGATIVE for nausea, abdominal pain, heartburn, or change in bowel habits  :  Negative   MUSCULOSKELETAL:  See HPI above  NEURO:  NEGATIVE for weakness, dizziness or paresthesias  ENDOCRINE:  NEGATIVE for temperature intolerance, skin/hair changes  HEME/ALLERGY/IMMUNE:  NEGATIVE for bleeding problems  PSYCHIATRIC:  NEGATIVE for changes in mood or affect      PHYSICAL EXAM:  /70 (BP Location: Right arm, Patient Position: Chair, Cuff Size: Adult Large)   There is no height or weight on file to calculate BMI.   GENERAL APPEARANCE: healthy, alert and no distress   HEENT: No apparent thyroid megaly. Clear sclera  with normal ocular movement  RESPIRATORY: No labored breathing  SKIN: no suspicious lesions or rashes  NEURO: Normal strength and tone, mentation intact and speech normal  VASCULAR: Good pulses, and capillary refill   LYMPH: no lymphadenopathy   PSYCH:  mentation appears normal and affect normal/bright    MUSCULOSKELETAL:  Limited range of motion of the neck with pain  Most of pain is along the trapezius, right    Shoulder range of motion itself is noted to be fairly symmetrical  Positive impingement sign, right  Negative arm drop test, right  Isometric strength is well-maintained bilaterally  AC joint was not tender  Pain with a palpation of the posterior and lateral aspect of the acromion  No specific pain along the biceps groove    Elbow range of motion is full, bilateral  Elbow flexion extension strength is full and symmetrical    No gross deformities in the wrist, bilateral  Mild enlargement of the IP joints throughout, bilateral    Right hand itself is without specific focal atrophy of the thenar and hyperthenar eminence  Limited finger range of motion but symmetrical  Mild catching of the index and long fingers, right  Palpable pain at the A1 pulleys of the right index and long fingers  Palpable pain at the A1 pulley of the left long finger  Decreased sensation of the right Thumb, index and long fingers    Painful basal thumb joint region, right  Mild pain along the first dorsal compartment, right         ASSESSMENT:  Chronic right shoulder pain with probable tendinopathy  No evidence of theo tear  History of rotator cuff tear repair    Right scaphotrapezial joint DJD    Mild trigger fingers of right index and long as well as left long fingers  Mild right carpal tunnel syndrome    History of fibromyalgia  History of cervical spine fusion    PLAN:      The x-rays of the right shoulder and the right hand taken today were visualized.  It is very difficult to tell exactly what pathology is causing what pain  since we have multiple factors going on at the same time.  Based on relatively decent x-ray findings and physical examination findings, no immediate surgical intervention was felt necessary for any part of the body.  Cortisone injection treatment for the involved body parts was felt to be very reasonable.  Right now, her right hand especially at the base of the thumb is bothering her the most.  She agreed to have a cortisone injection for that.  She tolerated the injection very well and noted improvement after the injection.  We will see her back in 6 weeks for consideration of cord injection to the shoulder and later possibly to the fingers.      Imaging Interpretation:    Postsurgical changes of rotator cuff tear repair and distal clavicle resection for the right shoulder.  Right-handed x-rays that showed multiple joints with a calcification changes in the capsule.  In terms of degenerative changes, most predominantly at the scaphotrapezial joint.  No acute findings are noted otherwise.        Hand / Upper Extremity Injection/Arthrocentesis  Date/Time: 9/11/2019 11:43 AM  Performed by: Truong Bui MD  Authorized by: Truong Bui MD     Indications:  Pain  Needle Size:  27 G  Guidance: landmark    Approach:  Dorsal   Condition comment:  Right scaphotrapezial joint    Medications:  1 mL lidocaine 1 %; 1 mL dexamethasone 120 MG/30ML  Outcome:  Tolerated well, no immediate complications  Procedure discussed: discussed risks, benefits, and alternatives    Consent Given by:  Patient  Timeout: timeout called immediately prior to procedure    Prep: patient was prepped and draped in usual sterile fashion            Truong Bui MD  Department of Orthopedic Surgery        Disclaimer: This note consists of symbols derived from keyboarding, dictation and/or voice recognition software. As a result, there may be errors in the script that have gone undetected. Please consider this when interpreting information found in  this chart.        Again, thank you for allowing me to participate in the care of your patient.        Sincerely,        Truong Bui MD

## 2019-09-11 NOTE — PROGRESS NOTES
HISTORY OF PRESENT ILLNESS:    Louann Crocker is a 79 year old female who is seen in consultation at the request of Dr. Mcgrath for right hand pain. Patient reports intermittent numbness and tingling of the right hand, she states that her pain has been more constant over the last 4 months.  Patient also reports bilateral shoulder pain, with increased pain in the right shoulder. Progressively worsening of shoulder pain over the last 2-3 months. Patient denies any trauma or injury to the upper extremity. Patient is accompanied by her  today.     Present symptoms: Right hand pain, tingling in the right thumb, and tingling progressing into the index, long, and ring finger. Patient reports pain at the base of the thumb, along the radial aspect of the wrist. She notes decreased strength in the hand.   Patient reports worsening of right shoulder pain over the last couple months. Patient reports anterior shoulder pain. Pain is throbbing at rest, and will increase with reaching behind the back, raising the arm overhead, and laterally. Increased pain with holding the arm at shoulder height.  Weakness after use of the arm due to pain. Patient states that at nighttime the shoulder will become, and cause her to toss and turn.    Treatments tried to this point: no treatment for hand pain, patient reports previous duputryns contracture injection.  Heating of shoulder and hand. Naproxen,  Lyrica 25mg BID.   Orthopedic PMH: bilateral RCR in [place] [id number], DEC/DCR left  2001 cervical fusion C5-6 in 2005.     Past Medical History:   Diagnosis Date     Anxiety      DDD (degenerative disc disease), cervical      DDD (degenerative disc disease), lumbar      Fibromyalgia      GERD (gastroesophageal reflux disease)      History of blood transfusion      IBS (irritable bowel syndrome)      Major depressive disorder, single episode, severe, without mention of psychotic behavior 2005    ECT x 16       Past Surgical History:    Procedure Laterality Date     APPENDECTOMY       ARTHROSCOPY KNEE RT/LT  2007     BACK SURGERY  5/2013    Sacroileac Fusion     BACK SURGERY  10/6/2014    Lumbar Fusion L3-L-4, L-4L-5     C FUSION MC-P JOINT,SINGLE  2010     C FUSION SHOULDER JOINT  1/01    lap bone sput     C ULLOA W/O FACETEC FORAMOT/DSKC 1/2 VRT SEG, LUMBAR  1993     C LAP,BILIARY TRACT,UNLISTED  1966     C LAPAROSCOPY, SURGICAL; W/ VAGINAL HYSTERECTOMY W/WO REMOVAL OVARY(S)/TUBES  1997    Laparoscopy, Vag Hysterectomy     CATARACT IOL, RT/LT  2008     COLONOSCOPY       ENDOSCOPIC RETROGRADE CHOLANGIOPANCREATOGRAPHY  2005     HC DILATION/CURETTAGE DIAG/THER NON OB  1961    D & C     HC ERCP W SPHINCTEROTOMY  12/09    CBD stones     HC REMOVAL GALLBLADDER  1964    Cholecystectomy     HC REPAIR ROTATOR CUFF,ACUTE  3/90     SURGICAL HISTORY OF -   2005    cervical     SURGICAL HISTORY OF -   2005    lumbar      SURGICAL HISTORY OF -   2008    right knee replacement     VITRECTOMY PARSPLANA WITH 25 GAUGE SYSTEM  5/10/2012    Procedure:VITRECTOMY PARSPLANA WITH 25 GAUGE SYSTEM; LEFT EYE 25 GAUGE VITRECTOMY, MEMBRANE STRIPPING, AIR  FLUID EXCHANGE, GAS 15% C3F8; Surgeon:ANTONIETTA WALTERS; Location:[facility]       Family History   Problem Relation Age of Onset     Arthritis Mother         fibromyalgia     Osteoporosis Mother      Cancer Mother         colon     Colon Cancer Mother      Hypertension Father      Cerebrovascular Disease Father      Substance Abuse Father      Substance Abuse Brother      Substance Abuse Son      Colon Cancer Son      Respiratory Brother         emphysema       Social History     Socioeconomic History     Marital status:      Spouse name: Amador     Number of children: 4     Years of education: Not on file     Highest education level: Not on file   Occupational History     Employer: RETIRED     Comment: self employed   Social Needs     Financial resource strain: Not on file     Food insecurity:     Worry: Not on file      Inability: Not on file     Transportation needs:     Medical: Not on file     Non-medical: Not on file   Tobacco Use     Smoking status: Former Smoker     Packs/day: 1.00     Years: 20.00     Pack years: 20.00     Types: Cigarettes     Last attempt to quit: 1977     Years since quittin.7     Smokeless tobacco: Never Used   Substance and Sexual Activity     Alcohol use: Yes     Comment: on occasion     Drug use: No     Sexual activity: Yes     Partners: Male     Birth control/protection: Surgical   Lifestyle     Physical activity:     Days per week: Not on file     Minutes per session: Not on file     Stress: Not on file   Relationships     Social connections:     Talks on phone: Not on file     Gets together: Not on file     Attends Spiritism service: Not on file     Active member of club or organization: Not on file     Attends meetings of clubs or organizations: Not on file     Relationship status: Not on file     Intimate partner violence:     Fear of current or ex partner: Not on file     Emotionally abused: Not on file     Physically abused: Not on file     Forced sexual activity: Not on file   Other Topics Concern     Parent/sibling w/ CABG, MI or angioplasty before 65F 55M? Not Asked   Social History Narrative     Not on file       Current Outpatient Medications   Medication Sig Dispense Refill     calcium carbonate-vitamin D 600-400 MG-UNIT CHEW Take 1 chew tab by mouth       cholecalciferol (VITAMIN D3) 5000 UNITS CAPS capsule Take 5,000 Units by mouth daily       clotrimazole (LOTRIMIN) 1 % cream Apply topically 2 times daily 15 g 11     Cyanocobalamin (B-12) 1000 MCG TBCR Take 1,000 mcg by mouth daily 100 tablet 1     DULoxetine HCl 40 MG CPEP Take 40 mg by mouth daily       Flaxseed, Linseed, (FLAX SEED OIL) 1000 MG capsule Take 1 capsule by mouth daily       fluticasone propionate (CUTIVATE) 0.005 % ointment Apply 1 g topically 2 times daily 1 Tube 11     MULTI-VITAMIN OR TABS 1 TABLET DAILY 30  0     NAPROXEN PO Take 220-440 mg by mouth 2 times daily as needed for moderate pain       nystatin (MYCOSTATIN) 523804 UNIT/GM POWD Apply topically 3 times daily as needed (itching) 60 g 11     Omeprazole (PRILOSEC PO) Take 20 mg by mouth every morning       Pregabalin (LYRICA PO) Take 25 mg by mouth daily       Pyridoxine HCl (VITAMIN B6 PO) Take 100 mg by mouth daily       tolterodine ER (DETROL LA) 4 MG 24 hr capsule TAKE 1 CAPSULE BY MOUTH ONCE DAILY 90 capsule 1       Allergies   Allergen Reactions     Adhesive Tape      Flagyl [Metronidazole Hcl]      Imidazole antifungals, HIVES & RASH     Nickel      rash       REVIEW OF SYSTEMS:  CONSTITUTIONAL:  NEGATIVE for fever, chills, change in weight  INTEGUMENTARY/SKIN:  NEGATIVE for worrisome rashes, moles or lesions  EYES:  NEGATIVE for vision changes or irritation  ENT/MOUTH:  NEGATIVE for ear, mouth and throat problems  RESP:  NEGATIVE for significant cough or SOB  BREAST:  NEGATIVE for masses, tenderness or discharge  CV:  NEGATIVE for chest pain, palpitations or peripheral edema  GI:  NEGATIVE for nausea, abdominal pain, heartburn, or change in bowel habits  :  Negative   MUSCULOSKELETAL:  See HPI above  NEURO:  NEGATIVE for weakness, dizziness or paresthesias  ENDOCRINE:  NEGATIVE for temperature intolerance, skin/hair changes  HEME/ALLERGY/IMMUNE:  NEGATIVE for bleeding problems  PSYCHIATRIC:  NEGATIVE for changes in mood or affect      PHYSICAL EXAM:  /70 (BP Location: Right arm, Patient Position: Chair, Cuff Size: Adult Large)   There is no height or weight on file to calculate BMI.   GENERAL APPEARANCE: healthy, alert and no distress   HEENT: No apparent thyroid megaly. Clear sclera with normal ocular movement  RESPIRATORY: No labored breathing  SKIN: no suspicious lesions or rashes  NEURO: Normal strength and tone, mentation intact and speech normal  VASCULAR: Good pulses, and capillary refill   LYMPH: no lymphadenopathy   PSYCH:  mentation  appears normal and affect normal/bright    MUSCULOSKELETAL:  Limited range of motion of the neck with pain  Most of pain is along the trapezius, right    Shoulder range of motion itself is noted to be fairly symmetrical  Positive impingement sign, right  Negative arm drop test, right  Isometric strength is well-maintained bilaterally  AC joint was not tender  Pain with a palpation of the posterior and lateral aspect of the acromion  No specific pain along the biceps groove    Elbow range of motion is full, bilateral  Elbow flexion extension strength is full and symmetrical    No gross deformities in the wrist, bilateral  Mild enlargement of the IP joints throughout, bilateral    Right hand itself is without specific focal atrophy of the thenar and hyperthenar eminence  Limited finger range of motion but symmetrical  Mild catching of the index and long fingers, right  Palpable pain at the A1 pulleys of the right index and long fingers  Palpable pain at the A1 pulley of the left long finger  Decreased sensation of the right Thumb, index and long fingers    Painful basal thumb joint region, right  Mild pain along the first dorsal compartment, right         ASSESSMENT:  Chronic right shoulder pain with probable tendinopathy  No evidence of theo tear  History of rotator cuff tear repair    Right scaphotrapezial joint DJD    Mild trigger fingers of right index and long as well as left long fingers  Mild right carpal tunnel syndrome    History of fibromyalgia  History of cervical spine fusion    PLAN:      The x-rays of the right shoulder and the right hand taken today were visualized.  It is very difficult to tell exactly what pathology is causing what pain since we have multiple factors going on at the same time.  Based on relatively decent x-ray findings and physical examination findings, no immediate surgical intervention was felt necessary for any part of the body.  Cortisone injection treatment for the involved body  parts was felt to be very reasonable.  Right now, her right hand especially at the base of the thumb is bothering her the most.  She agreed to have a cortisone injection for that.  She tolerated the injection very well and noted improvement after the injection.  We will see her back in 6 weeks for consideration of cord injection to the shoulder and later possibly to the fingers.      Imaging Interpretation:    Postsurgical changes of rotator cuff tear repair and distal clavicle resection for the right shoulder.  Right-handed x-rays that showed multiple joints with a calcification changes in the capsule.  In terms of degenerative changes, most predominantly at the scaphotrapezial joint.  No acute findings are noted otherwise.        Hand / Upper Extremity Injection/Arthrocentesis  Date/Time: 9/11/2019 11:43 AM  Performed by: Truong Bui MD  Authorized by: Truong Bui MD     Indications:  Pain  Needle Size:  27 G  Guidance: landmark    Approach:  Dorsal   Condition comment:  Right scaphotrapezial joint    Medications:  1 mL lidocaine 1 %; 1 mL dexamethasone 120 MG/30ML  Outcome:  Tolerated well, no immediate complications  Procedure discussed: discussed risks, benefits, and alternatives    Consent Given by:  Patient  Timeout: timeout called immediately prior to procedure    Prep: patient was prepped and draped in usual sterile fashion            Truong Bui MD  Department of Orthopedic Surgery        Disclaimer: This note consists of symbols derived from keyboarding, dictation and/or voice recognition software. As a result, there may be errors in the script that have gone undetected. Please consider this when interpreting information found in this chart.

## 2019-09-26 ENCOUNTER — TRANSFERRED RECORDS (OUTPATIENT)
Dept: HEALTH INFORMATION MANAGEMENT | Facility: CLINIC | Age: 79
End: 2019-09-26

## 2019-12-08 ENCOUNTER — HEALTH MAINTENANCE LETTER (OUTPATIENT)
Age: 79
End: 2019-12-08

## 2019-12-16 DIAGNOSIS — N39.41 URGE INCONTINENCE OF URINE: ICD-10-CM

## 2019-12-16 RX ORDER — TOLTERODINE 4 MG/1
CAPSULE, EXTENDED RELEASE ORAL
Qty: 90 CAPSULE | Refills: 0 | Status: SHIPPED | OUTPATIENT
Start: 2019-12-16 | End: 2020-03-16

## 2019-12-16 NOTE — TELEPHONE ENCOUNTER
tolterodine      Last Written Prescription Date:  6/24/19  Last Fill Quantity: 90,   # refills: 1  Last Office Visit: 10/15/18  Future Office visit:

## 2019-12-16 NOTE — TELEPHONE ENCOUNTER
Medication is being filled for 1 time refill only due to:  Patient needs to be seen because it has been more than one year since last visit.     Smita CAMP RN

## 2020-01-03 ENCOUNTER — MYC MEDICAL ADVICE (OUTPATIENT)
Dept: FAMILY MEDICINE | Facility: CLINIC | Age: 80
End: 2020-01-03

## 2020-03-15 ENCOUNTER — HEALTH MAINTENANCE LETTER (OUTPATIENT)
Age: 80
End: 2020-03-15

## 2020-03-16 DIAGNOSIS — N39.41 URGE INCONTINENCE OF URINE: ICD-10-CM

## 2020-03-16 RX ORDER — TOLTERODINE 4 MG/1
CAPSULE, EXTENDED RELEASE ORAL
Qty: 30 CAPSULE | Refills: 0 | Status: SHIPPED | OUTPATIENT
Start: 2020-03-16 | End: 2020-04-22

## 2020-03-16 NOTE — TELEPHONE ENCOUNTER
Medication is being filled for 1 time refill only due to:  Patient needs to be seen because it has been more than one year since last visit.     She has already been given a courtesy refill.    Smita CAMP RN

## 2020-03-16 NOTE — TELEPHONE ENCOUNTER
Tolterodine Tart ER 4MG Capsules      Last Written Prescription Date:  12//16/2019  Last Fill Quantity: 90,   # refills: 0  Last Office Visit: 10/15/2018  Future Office visit:   none

## 2020-04-20 DIAGNOSIS — N39.41 URGE INCONTINENCE OF URINE: ICD-10-CM

## 2020-04-20 RX ORDER — TOLTERODINE 4 MG/1
CAPSULE, EXTENDED RELEASE ORAL
Qty: 30 CAPSULE | Refills: 0 | OUTPATIENT
Start: 2020-04-20

## 2020-04-20 NOTE — TELEPHONE ENCOUNTER
Tolterodine Tart ER 4MG Capsules      Last Written Prescription Date:  3/16/20  Last Fill Quantity: 30,   # refills: 0  Last Office Visit: 10/15/2018  Future Office visit:   none

## 2020-04-20 NOTE — TELEPHONE ENCOUNTER
Last OV 10/15/18.    Pt needs to have a virtual visit prior to the refill. She will need to have an annual exam done once the restrictions are lifted for in office appts.     I spoke with the visit and scheduled for her virtual visit on 4/22/20 with Dr. Mathews, as Dr. Kan is on rosalee for 2 weeks.     Smita CAMP RN

## 2020-04-22 ENCOUNTER — VIRTUAL VISIT (OUTPATIENT)
Dept: OBGYN | Facility: CLINIC | Age: 80
End: 2020-04-22
Payer: MEDICARE

## 2020-04-22 DIAGNOSIS — N39.41 URGE INCONTINENCE OF URINE: ICD-10-CM

## 2020-04-22 PROCEDURE — 99441 ZZC PHYSICIAN TELEPHONE EVALUATION 5-10 MIN: CPT | Performed by: OBSTETRICS & GYNECOLOGY

## 2020-04-22 RX ORDER — TOLTERODINE 4 MG/1
CAPSULE, EXTENDED RELEASE ORAL
Qty: 90 CAPSULE | Refills: 3 | Status: SHIPPED | OUTPATIENT
Start: 2020-04-22 | End: 2021-09-22

## 2020-04-22 RX ORDER — MULTIVIT-MIN/IRON/FOLIC ACID/K 18-600-40
CAPSULE ORAL
COMMUNITY

## 2020-04-22 NOTE — PROGRESS NOTES
"Louann Crocker is a 79 year old female who is being evaluated via a billable telephone visit.      The patient has been notified of following:     \"This telephone visit will be conducted via a call between you and your physician/provider. We have found that certain health care needs can be provided without the need for a physical exam.  This service lets us provide the care you need with a short phone conversation.  If a prescription is necessary we can send it directly to your pharmacy.  If lab work is needed we can place an order for that and you can then stop by our lab to have the test done at a later time.    Telephone visits are billed at different rates depending on your insurance coverage. During this emergency period, for some insurers they may be billed the same as an in-person visit.  Please reach out to your insurance provider with any questions.    If during the course of the call the physician/provider feels a telephone visit is not appropriate, you will not be charged for this service.\"    Patient has given verbal consent for Telephone visit?  Yes    How would you like to obtain your AVS? Shandra    Louann Crocker is a 79 year old female with a history of urge incontinence. She has been taking detrol LA 4 mg daily since 10/2018 and is happy with the medication. She reports significant improvement but reports minimal ongoing daily leaking.   She consumes eight 8-ounce glasses of water daily in addition to 1 glass of milk and 2-3 cups of coffee daily.     We reviewed fluid consumption and recommended slight reduction in overall volume with reduction in caffiene as well in an an attempt to reduce ongoing mild incontinence.     1. Urge incontinence of urine  - tolterodine ER (DETROL LA) 4 MG 24 hr capsule; TAKE 1 CAPSULE BY MOUTH ONCE DAILY  Dispense: 90 capsule; Refill: 3    F/u in clinic with Dr. Kan upon her return from [place] this summer.      Total time on telephone visit was 6 minutes. "       Gladys Mathews MD

## 2020-06-22 ENCOUNTER — VIRTUAL VISIT (OUTPATIENT)
Dept: FAMILY MEDICINE | Facility: CLINIC | Age: 80
End: 2020-06-22
Payer: MEDICARE

## 2020-06-22 DIAGNOSIS — R05.9 COUGH: Primary | ICD-10-CM

## 2020-06-22 DIAGNOSIS — J02.9 SORE THROAT: ICD-10-CM

## 2020-06-22 PROCEDURE — 99213 OFFICE O/P EST LOW 20 MIN: CPT | Mod: CS | Performed by: FAMILY MEDICINE

## 2020-06-22 RX ORDER — BENZONATATE 100 MG/1
100 CAPSULE ORAL 3 TIMES DAILY PRN
Qty: 30 CAPSULE | Refills: 0 | Status: SHIPPED | OUTPATIENT
Start: 2020-06-22 | End: 2021-09-22

## 2020-06-22 NOTE — PROGRESS NOTES
"Louann Crocker is a 79 year old female who is being evaluated via a billable video visit.      The patient has been notified of following:     \"This video visit will be conducted via a call between you and your physician/provider. We have found that certain health care needs can be provided without the need for an in-person physical exam.  This service lets us provide the care you need with a video conversation.  If a prescription is necessary we can send it directly to your pharmacy.  If lab work is needed we can place an order for that and you can then stop by our lab to have the test done at a later time.    Video visits are billed at different rates depending on your insurance coverage.  Please reach out to your insurance provider with any questions.    If during the course of the call the physician/provider feels a video visit is not appropriate, you will not be charged for this service.\"    Patient has given verbal consent for Video visit? Yes    Will anyone else be joining your video visit? No    [phone]    Subjective     Louann Crocker is a 79 year old female who presents today via video visit for the following health issues:    HPI  Acute Illness   Acute illness concerns: throat and SOB  Onset: 6/18/2020    Fever: no    Chills/Sweats: YES- both    Headache (location?): YES- a couple times    Sinus Pressure:no    Conjunctivitis:  no    Ear Pain: YES: right    Rhinorrhea: YES    Congestion: no    Sore Throat: YES     Cough: YES    Wheeze: no    Decreased Appetite: no    Nausea: no    Vomiting: no    Diarrhea:  no    Dysuria/Freq.: no    Fatigue/Achiness: YES- sore all over    Sick/Strep Exposure: no     Therapies Tried and outcome: none    Pt reports the above symptoms which started about 4 days ago, started with a sore throat, hard to swallow.  Has gotten somewhat better.  Coughing all the time, very tired, a little SOB.   No known COVID contacts.  Some SOB but no known wheezing.  Cough, " "non-productive of sputum, is dry.  Guaifenesin made nose run, but did nothing for cough.     Video Start Time: 11:50 am    Current Outpatient Medications   Medication Sig Dispense Refill     calcium carbonate-vitamin D 600-400 MG-UNIT CHEW Take 1 chew tab by mouth       Cyanocobalamin (B-12) 1000 MCG TBCR Take 1,000 mcg by mouth daily 100 tablet 1     DULoxetine HCl 40 MG CPEP Take 40 mg by mouth daily       fluticasone propionate (CUTIVATE) 0.005 % ointment Apply 1 g topically 2 times daily 1 Tube 11     MULTI-VITAMIN OR TABS 1 TABLET DAILY 30 0     NAPROXEN PO Take 220-440 mg by mouth 2 times daily as needed for moderate pain       Omeprazole (PRILOSEC PO) Take 20 mg by mouth every morning       Pyridoxine HCl (VITAMIN B6 PO) Take 100 mg by mouth daily       tolterodine ER (DETROL LA) 4 MG 24 hr capsule TAKE 1 CAPSULE BY MOUTH ONCE DAILY 90 capsule 3     Vitamin D, Cholecalciferol, 25 MCG (1000 UT) TABS        Allergies   Allergen Reactions     Adhesive Tape      Flagyl [Metronidazole Hcl]      Imidazole antifungals, HIVES & RASH     Nickel      rash       Reviewed and updated as needed this visit by Provider         Review of Systems   Constitutional, HEENT, cardiovascular, pulmonary, gi and gu systems are negative, except as otherwise noted.      Objective    There were no vitals taken for this visit.  Estimated body mass index is 30.93 kg/m  as calculated from the following:    Height as of 10/2/18: 1.6 m (5' 3\").    Weight as of 9/9/19: 79.2 kg (174 lb 9.6 oz).  Physical Exam     GENERAL: Healthy, alert and no distress  EYES: Eyes grossly normal to inspection.  No discharge or erythema, or obvious scleral/conjunctival abnormalities.  RESP: No audible wheeze or visible cyanosis.  No visible retractions or increased work of breathing.  Occasional dry cough.  SKIN: Visible skin clear. No significant rash, abnormal pigmentation or lesions.  NEURO: Cranial nerves grossly intact.  Mentation and speech appropriate " for age.  PSYCH: Mentation appears normal, affect normal/bright, judgement and insight intact, normal speech and appearance well-groomed.      Diagnostic Test Results:  none         Assessment & Plan     1. Cough  Patient has symptoms of cough, sore throat, and headache.  I will go ahead and order COVID-19 PCR testing.  I will also do a prescription for Tessalon Perles.  I discussed with the patient over-the-counter use of dextromethorphan, salt water gargles, oral analgesics such as Tylenol, and/or hot liquids such as tea with honey.  Patient will contact us at the end of the week if she is not having any improvement, at that time we may consider prescription of antibiotics as needed.  Otherwise follow-up as needed.  - Symptomatic COVID-19 Virus (Coronavirus) by PCR; Future  - benzonatate (TESSALON) 100 MG capsule; Take 1 capsule (100 mg) by mouth 3 times daily as needed for cough  Dispense: 30 capsule; Refill: 0    2. Sore throat  As above         There are no Patient Instructions on file for this visit.    No follow-ups on file.    Kayli Jean-Baptiste MD  [facility]      Video-Visit Details    Type of service:  Video Visit    Video End Time:12:04 pm    Originating Location (pt. Location): Home    Distant Location (provider location):  [facility]     Platform used for Video Visit: Jesus    No follow-ups on file.       Kayli Jean-Baptiste MD

## 2020-06-23 DIAGNOSIS — R05.9 COUGH: ICD-10-CM

## 2020-06-23 PROCEDURE — U0003 INFECTIOUS AGENT DETECTION BY NUCLEIC ACID (DNA OR RNA); SEVERE ACUTE RESPIRATORY SYNDROME CORONAVIRUS 2 (SARS-COV-2) (CORONAVIRUS DISEASE [COVID-19]), AMPLIFIED PROBE TECHNIQUE, MAKING USE OF HIGH THROUGHPUT TECHNOLOGIES AS DESCRIBED BY CMS-2020-01-R: HCPCS | Performed by: FAMILY MEDICINE

## 2020-06-23 NOTE — LETTER
June 25, 2020        Louann Crocker  [address] [place] [address]    This letter provides a written record that you were tested for COVID-19 on 6/23/20.       Your result was negative. This means that we didn t find the virus that causes COVID-19 in your sample. A test may show negative when you do actually have the virus. This can happen when the virus is in the early stages of infection, before you feel illness symptoms.    If you have symptoms   Stay home and away from others (self-isolate) until you meet ALL of the guidelines below:    You ve had no fever--and no medicine that reduces fever--for 3 full days (72 hours). And      Your other symptoms have gotten better. For example, your cough or breathing has improved. And     At least 10 days have passed since your symptoms started.    During this time:    Stay home. Don t go to work, school or anywhere else.     Stay in your own room, including for meals. Use your own bathroom if you can.    Stay away from others in your home. No hugging, kissing or shaking hands. No visitors.    Clean  high touch  surfaces often (doorknobs, counters, handles, etc.). Use a household cleaning spray or wipes. You can find a full list on the EPA website at www.epa.gov/pesticide-registration/list-n-disinfectants-use-against-sars-cov-2.    Cover your mouth and nose with a mask, tissue or washcloth to avoid spreading germs.    Wash your hands and face often with soap and water.    Going back to work  Check with your employer for any guidelines to follow for going back to work.    Employers: This document serves as formal notice that your employee tested negative for COVID-19, as of the testing date shown above.

## 2020-06-24 ENCOUNTER — MYC MEDICAL ADVICE (OUTPATIENT)
Dept: FAMILY MEDICINE | Facility: CLINIC | Age: 80
End: 2020-06-24

## 2020-06-24 DIAGNOSIS — R05.9 COUGH: Primary | ICD-10-CM

## 2020-06-24 DIAGNOSIS — J02.9 SORE THROAT: ICD-10-CM

## 2020-06-24 LAB
SARS-COV-2 RNA SPEC QL NAA+PROBE: NOT DETECTED
SPECIMEN SOURCE: NORMAL

## 2020-06-25 NOTE — TELEPHONE ENCOUNTER
Will route to triage    Yodit Thurman/[facility]  [place]

## 2020-06-25 NOTE — TELEPHONE ENCOUNTER
Ok for antibiotics today if wanted, ok to wait until Monday also.  Pt is right around that 7 day quinn, so could be on tail end of viral infection but is within window to consider bacterial infection.

## 2020-06-25 NOTE — TELEPHONE ENCOUNTER
"Routed to [facility], see pt MYchart message and advise      Last office visit: 6/22/20 with provider:  Virtual visit  Future Office Visit:        \"Patient will contact us at the end of the week if she is not having any improvement, at that time we may consider prescription of antibiotics as needed.  Otherwise follow-up as needed\"  Katina Li RN, BSN  Message handled by CLINIC NURSE.    "

## 2020-06-26 RX ORDER — AZITHROMYCIN 250 MG/1
TABLET, FILM COATED ORAL
Qty: 6 TABLET | Refills: 0 | Status: SHIPPED | OUTPATIENT
Start: 2020-06-26 | End: 2020-07-01

## 2020-10-28 ENCOUNTER — OFFICE VISIT (OUTPATIENT)
Dept: URGENT CARE | Facility: URGENT CARE | Age: 80
End: 2020-10-28
Payer: MEDICARE

## 2020-10-28 ENCOUNTER — ANCILLARY PROCEDURE (OUTPATIENT)
Dept: GENERAL RADIOLOGY | Facility: CLINIC | Age: 80
End: 2020-10-28
Attending: PHYSICIAN ASSISTANT
Payer: MEDICARE

## 2020-10-28 VITALS
DIASTOLIC BLOOD PRESSURE: 80 MMHG | TEMPERATURE: 97.3 F | SYSTOLIC BLOOD PRESSURE: 160 MMHG | OXYGEN SATURATION: 99 % | HEART RATE: 78 BPM

## 2020-10-28 DIAGNOSIS — M25.562 ACUTE PAIN OF LEFT KNEE: ICD-10-CM

## 2020-10-28 DIAGNOSIS — M25.562 ACUTE PAIN OF LEFT KNEE: Primary | ICD-10-CM

## 2020-10-28 PROCEDURE — 99213 OFFICE O/P EST LOW 20 MIN: CPT | Performed by: PHYSICIAN ASSISTANT

## 2020-10-28 PROCEDURE — 73562 X-RAY EXAM OF KNEE 3: CPT | Mod: LT | Performed by: RADIOLOGY

## 2020-10-28 NOTE — PROGRESS NOTES
SUBJECTIVE:   Louann Crocker is a 80 year old female presenting with a chief complaint of   Chief Complaint   Patient presents with     Urgent Care     Musculoskeletal Problem     Left knee pain-Twisted leg today        She is an established patient of [facility].    Knee Pain    Onset of symptoms was  today  Location: left knee  Context:       The injury happened while at home.  She was moving boxes, and pivoted to turn out of the room, and somewhat twisted the left knee. No falls. She has been unable to bear weight on the left leg since then due to pain. The knee feels somewhat unstable, knee feels like it is going to give out. Rates pain at 8/10.  Course of symptoms is same.    Severity moderately severe  Current and Associated symptoms: Pain, Tenderness and Decreased range of motion  Denies  Swelling, Bruising, Warmth and Redness  Aggravating Factors: walking, weight-bearing and movement  Therapies to improve symptoms include: naproxen, icing  Hx of right TKA.  Patient is accompanied by her  today. They are driving to [place] in 2 days.      Review of Systems   Musculoskeletal:        Left knee pain   Skin: Negative for wound.       Past Medical History:   Diagnosis Date     Anxiety      DDD (degenerative disc disease), cervical      DDD (degenerative disc disease), lumbar      Fibromyalgia      GERD (gastroesophageal reflux disease)      History of blood transfusion      IBS (irritable bowel syndrome)      Major depressive disorder, single episode, severe, without mention of psychotic behavior 2005    ECT x 16     Family History   Problem Relation Age of Onset     Arthritis Mother         fibromyalgia     Osteoporosis Mother      Cancer Mother         colon     Colon Cancer Mother      Hypertension Father      Cerebrovascular Disease Father      Substance Abuse Father      Substance Abuse Brother      Substance Abuse Son      Colon Cancer Son      Respiratory Brother         emphysema     Current  Outpatient Medications   Medication Sig Dispense Refill     calcium carbonate-vitamin D 600-400 MG-UNIT CHEW Take 1 chew tab by mouth       Cyanocobalamin (B-12) 1000 MCG TBCR Take 1,000 mcg by mouth daily 100 tablet 1     DULoxetine HCl 40 MG CPEP Take 40 mg by mouth daily       MULTI-VITAMIN OR TABS 1 TABLET DAILY 30 0     NAPROXEN PO Take 220-440 mg by mouth 2 times daily as needed for moderate pain       Omeprazole (PRILOSEC PO) Take 20 mg by mouth every morning       Pyridoxine HCl (VITAMIN B6 PO) Take 100 mg by mouth daily       Vitamin D, Cholecalciferol, 25 MCG (1000 UT) TABS        benzonatate (TESSALON) 100 MG capsule Take 1 capsule (100 mg) by mouth 3 times daily as needed for cough (Patient not taking: Reported on 10/28/2020) 30 capsule 0     fluticasone propionate (CUTIVATE) 0.005 % ointment Apply 1 g topically 2 times daily (Patient not taking: Reported on 10/28/2020) 1 Tube 11     tolterodine ER (DETROL LA) 4 MG 24 hr capsule TAKE 1 CAPSULE BY MOUTH ONCE DAILY (Patient not taking: Reported on 10/28/2020) 90 capsule 3     Social History     Tobacco Use     Smoking status: Former Smoker     Packs/day: 1.00     Years: 20.00     Pack years: 20.00     Types: Cigarettes     Quit date: 1977     Years since quittin.8     Smokeless tobacco: Never Used   Substance Use Topics     Alcohol use: Yes     Comment: on occasion       OBJECTIVE  BP (!) 160/80 (BP Location: Right arm, Patient Position: Sitting, Cuff Size: Adult Regular)   Pulse 78   Temp 97.3  F (36.3  C) (Tympanic)   SpO2 99%   Breastfeeding No     Physical Exam  Constitutional:       General: She is not in acute distress.     Appearance: She is well-developed.   HENT:      Head: Normocephalic and atraumatic.      Right Ear: External ear normal.      Left Ear: External ear normal.   Eyes:      Conjunctiva/sclera: Conjunctivae normal.   Neck:      Musculoskeletal: Normal range of motion.   Pulmonary:      Effort: Pulmonary effort is normal.  No respiratory distress.      Breath sounds: Normal breath sounds.   Musculoskeletal:         General: Tenderness present. No swelling.      Comments: Left knee exam: No gross deformities, swelling or ecchymosis noted. No erythema. No warmth.  Patellofemoral tenderness. Significant pain with knee extension. Unable to flex past 90 degrees due to pain. No tenderness over medial or lateral joint line. Tenderness to palpation posterior knee. No joint laxity noted. Positive Gayla's. She can barely put weight on the left leg. She is walking on her her toes on the left.   Skin:     General: Skin is warm and dry.   Neurological:      Mental Status: She is alert.         Labs:  No results found for this or any previous visit (from the past 24 hour(s)).    KNEE LEFT THREE VIEWS   10/28/2020 4:00 PM      HISTORY: No known injury. Acute pain of left knee.     COMPARISON: None.                                                                      IMPRESSION: Mild chondrocalcinosis of the menisci. Joint spaces appear  fairly well preserved except perhaps mild to moderate central  patellofemoral joint space loss. No evidence of acute fracture or  subluxation. Small spur at superior pole of the patella at quadriceps  insertion site. There is a posterior osteophyte seen off the posterior  lip of the tibial plateau that is difficult to localize on the AP  view. No joint effusion.     LUPE ZAMORA MD    ASSESSMENT:      ICD-10-CM    1. Acute pain of left knee  M25.562 XR Knee Left 3 Views     Orthopedic & Spine  Referral          PLAN:    Acute pain of left knee: Xray reveals today mild to moderate patellofemoral joint space loss. No evidence of acute fracture or dislocation. No joint effusion. Concerns for meniscal injury based on exam. Patient and her  planning to drive to [place] in 2 days. Orthopedic referral. Might benefit from local steroid injection. Continue Naproxen. Weight bearing as tolerated. Follow up  with Ortho as discussed. Patient and her  agree.     Followup:  With Ortho    Patient Instructions       Patient Education     RICE     Rest an injury, elevate it, and use ice and compression as directed.   RICE stands for rest, ice, compression, and elevation. These can limit pain and swelling after an injury. RICE may be recommended to help treat breaks (fractures), sprains, strains, and bruises or bumps.   Home care  Here are the details of RICE:    Rest. Limit the use of the injured body part. This helps prevent further damage to the body part and gives it time to heal. In some cases, you may need a sling, brace, splint, or cast to help keep the body part still until it has healed.    Ice. Applying ice right after an injury helps relieve pain and swelling. To make an ice pack, put ice cubes in a plastic bag that seals at the top. Wrap the bag in a clean, thin towel or cloth. Then place it over the injured area. Do this for 10 to 15 minutes every 3 to 4 hours. Continue for the next 1 to 3 days or until your symptoms improve. Never put ice directly on your skin. Don't ice an area longer than 15 minutes at a time.    Compression. Putting pressure on an injury helps reduce swelling and provides support. Wrap the injured area firmly with an elastic bandage or wrap. Make sure not to wrap the bandage too tightly or you will cut off blood flow to the injured area. If your bandage loosens, rewrap it.    Elevation. Keeping an injury raised or elevated above the level of your heart reduces swelling, pain, and throbbing. For instance, if you have a broken leg, it may help to rest your leg on several pillows when sitting or lying down. Try to keep the injured area elevated as often as possible.  Follow-up care  Follow up with your healthcare provider, or as advised.  When to seek medical advice  Call your healthcare provider right away if any of these occur:    Fever of 100.4 F (38 C) or higher, or as directed by your  healthcare provider    Chills    Increased pain or swelling in the injured body part    Injured body part becomes cold, blue, numb, or tingly    Signs of infection. These include warmth in the skin, redness, drainage, or bad smell coming from the injured body part.  Jerri last reviewed this educational content on 6/1/2018 2000-2020 [employer]. [address] [place] [address]. All rights reserved. This information is not intended as a substitute for professional medical care. Always follow your healthcare professional's instructions.         Pat to follow up with Primary Care provider regarding elevated blood pressure.

## 2020-10-29 ENCOUNTER — OFFICE VISIT (OUTPATIENT)
Dept: ORTHOPEDICS | Facility: CLINIC | Age: 80
End: 2020-10-29
Attending: PHYSICIAN ASSISTANT
Payer: MEDICARE

## 2020-10-29 VITALS
WEIGHT: 148 LBS | BODY MASS INDEX: 26.22 KG/M2 | DIASTOLIC BLOOD PRESSURE: 64 MMHG | SYSTOLIC BLOOD PRESSURE: 122 MMHG | HEIGHT: 63 IN

## 2020-10-29 DIAGNOSIS — M25.562 ACUTE PAIN OF LEFT KNEE: ICD-10-CM

## 2020-10-29 DIAGNOSIS — M17.12 PRIMARY OSTEOARTHRITIS OF LEFT KNEE: Primary | ICD-10-CM

## 2020-10-29 PROCEDURE — 99213 OFFICE O/P EST LOW 20 MIN: CPT | Mod: 25 | Performed by: ORTHOPAEDIC SURGERY

## 2020-10-29 PROCEDURE — 20610 DRAIN/INJ JOINT/BURSA W/O US: CPT | Mod: LT | Performed by: ORTHOPAEDIC SURGERY

## 2020-10-29 RX ORDER — TESTOSTERONE CYPIONATE 200 MG/ML
2 INJECTION INTRAMUSCULAR
Status: SHIPPED | OUTPATIENT
Start: 2020-10-29

## 2020-10-29 RX ORDER — LIDOCAINE HYDROCHLORIDE 10 MG/ML
4 INJECTION, SOLUTION INFILTRATION; PERINEURAL
Status: SHIPPED | OUTPATIENT
Start: 2020-10-29

## 2020-10-29 RX ADMIN — TESTOSTERONE CYPIONATE 2 ML: 200 INJECTION INTRAMUSCULAR at 08:55

## 2020-10-29 RX ADMIN — LIDOCAINE HYDROCHLORIDE 4 ML: 10 INJECTION, SOLUTION INFILTRATION; PERINEURAL at 08:55

## 2020-10-29 ASSESSMENT — MIFFLIN-ST. JEOR: SCORE: 1110.45

## 2020-10-29 ASSESSMENT — ENCOUNTER SYMPTOMS: WOUND: 0

## 2020-10-29 NOTE — PATIENT INSTRUCTIONS
Patient Education     RICE     Rest an injury, elevate it, and use ice and compression as directed.   RICE stands for rest, ice, compression, and elevation. These can limit pain and swelling after an injury. RICE may be recommended to help treat breaks (fractures), sprains, strains, and bruises or bumps.   Home care  Here are the details of RICE:    Rest. Limit the use of the injured body part. This helps prevent further damage to the body part and gives it time to heal. In some cases, you may need a sling, brace, splint, or cast to help keep the body part still until it has healed.    Ice. Applying ice right after an injury helps relieve pain and swelling. To make an ice pack, put ice cubes in a plastic bag that seals at the top. Wrap the bag in a clean, thin towel or cloth. Then place it over the injured area. Do this for 10 to 15 minutes every 3 to 4 hours. Continue for the next 1 to 3 days or until your symptoms improve. Never put ice directly on your skin. Don't ice an area longer than 15 minutes at a time.    Compression. Putting pressure on an injury helps reduce swelling and provides support. Wrap the injured area firmly with an elastic bandage or wrap. Make sure not to wrap the bandage too tightly or you will cut off blood flow to the injured area. If your bandage loosens, rewrap it.    Elevation. Keeping an injury raised or elevated above the level of your heart reduces swelling, pain, and throbbing. For instance, if you have a broken leg, it may help to rest your leg on several pillows when sitting or lying down. Try to keep the injured area elevated as often as possible.  Follow-up care  Follow up with your healthcare provider, or as advised.  When to seek medical advice  Call your healthcare provider right away if any of these occur:    Fever of 100.4 F (38 C) or higher, or as directed by your healthcare provider    Chills    Increased pain or swelling in the injured body part    Injured body part  becomes cold, blue, numb, or tingly    Signs of infection. These include warmth in the skin, redness, drainage, or bad smell coming from the injured body part.  [employer] last reviewed this educational content on 6/1/2018 2000-2020 [employer], [employer]. [address] [place] [id number]. All rights reserved. This information is not intended as a substitute for professional medical care. Always follow your healthcare professional's instructions.         Pat to follow up with Primary Care provider regarding elevated blood pressure.

## 2020-10-29 NOTE — LETTER
10/29/2020         RE: Louann Crocker  [address]  [facility] [address]        Dear Colleague,    Thank you for referring your patient, Louann Crocker, to the [facility]. Please see a copy of my visit note below.    HISTORY OF PRESENT ILLNESS:    Louann Crocker is a 80 year old female who is seen in consultation at the request of Dr. Levine for left knee pain, date of injury: 10/28/20.  Patient reports she moving boxes, and pivoted to turn out of the room. She felt a sharp pain prior. She does note a few days prior she felt the left knee felt usual as if something was moving across the anterior aspect of the knee.   Patient is accompanied by her  today.    Present symptoms: left posterior knee pain, pain with weight bearing, radiation of pain down the shin. Burning sensation radiation on the top of the left foot. Acute swelling that has resolved with ice. Difficulties sleeping in bed due to pain while side lying.  Sensation of instability it the knee.   Current pain level: 8/10,  Worst pain level: 10/10  Treatments tried to this point: icing, naproxen   Orthopedic PMH: right TKA, DOS 2007, low back pain with SI joint fusion, Lumbar fusion L3-4, L4-L5, cervical fusion    Past Medical History:   Diagnosis Date     Anxiety      DDD (degenerative disc disease), cervical      DDD (degenerative disc disease), lumbar      Fibromyalgia      GERD (gastroesophageal reflux disease)      History of blood transfusion      IBS (irritable bowel syndrome)      Major depressive disorder, single episode, severe, without mention of psychotic behavior 2005    ECT x 16       Past Surgical History:   Procedure Laterality Date     APPENDECTOMY       ARTHROSCOPY KNEE RT/LT  2007     BACK SURGERY  5/2013    Sacroileac Fusion     BACK SURGERY  10/6/2014    Lumbar Fusion L3-L-4, L-4L-5     C FUSION MC-P JOINT,SINGLE  2010     C FUSION SHOULDER JOINT  1/01    lap bone  sput     C ULLOA W/O FACETEC FORAMOT/DSKC 1/2 VRT SEG, LUMBAR  1993     C LAP,BILIARY TRACT,UNLISTED  1966     C LAPAROSCOPY, SURGICAL; W/ VAGINAL HYSTERECTOMY W/WO REMOVAL OVARY(S)/TUBES  1997    Laparoscopy, Vag Hysterectomy     CATARACT IOL, RT/LT  2008     COLONOSCOPY       ENDOSCOPIC RETROGRADE CHOLANGIOPANCREATOGRAPHY  2005     HC DILATION/CURETTAGE DIAG/THER NON OB  1961    D & C     HC ERCP W SPHINCTEROTOMY  12/09    CBD stones     HC REMOVAL GALLBLADDER  1964    Cholecystectomy     HC REPAIR ROTATOR CUFF,ACUTE  3/90     SURGICAL HISTORY OF -   2005    cervical     SURGICAL HISTORY OF -   2005    lumbar      SURGICAL HISTORY OF -   2008    right knee replacement     VITRECTOMY PARSPLANA WITH 25 GAUGE SYSTEM  5/10/2012    Procedure:VITRECTOMY PARSPLANA WITH 25 GAUGE SYSTEM; LEFT EYE 25 GAUGE VITRECTOMY, MEMBRANE STRIPPING, AIR  FLUID EXCHANGE, GAS 15% C3F8; Surgeon:ANTONIETTA WALTERS; Location:[facility]       Family History   Problem Relation Age of Onset     Arthritis Mother         fibromyalgia     Osteoporosis Mother      Cancer Mother         colon     Colon Cancer Mother      Hypertension Father      Cerebrovascular Disease Father      Substance Abuse Father      Substance Abuse Brother      Substance Abuse Son      Colon Cancer Son      Respiratory Brother         emphysema       Social History     Socioeconomic History     Marital status:      Spouse name: Amador     Number of children: 4     Years of education: Not on file     Highest education level: Not on file   Occupational History     Employer: RETIRED     Comment: self employed   Social Needs     Financial resource strain: Not on file     Food insecurity     Worry: Not on file     Inability: Not on file     Transportation needs     Medical: Not on file     Non-medical: Not on file   Tobacco Use     Smoking status: Former Smoker     Packs/day: 1.00     Years: 20.00     Pack years: 20.00     Types: Cigarettes     Quit date: 1/1/1977     Years  since quittin.8     Smokeless tobacco: Never Used   Substance and Sexual Activity     Alcohol use: Yes     Comment: on occasion     Drug use: No     Sexual activity: Yes     Partners: Male     Birth control/protection: Surgical   Lifestyle     Physical activity     Days per week: Not on file     Minutes per session: Not on file     Stress: Not on file   Relationships     Social connections     Talks on phone: Not on file     Gets together: Not on file     Attends Judaism service: Not on file     Active member of club or organization: Not on file     Attends meetings of clubs or organizations: Not on file     Relationship status: Not on file     Intimate partner violence     Fear of current or ex partner: Not on file     Emotionally abused: Not on file     Physically abused: Not on file     Forced sexual activity: Not on file   Other Topics Concern     Parent/sibling w/ CABG, MI or angioplasty before 65F 55M? Not Asked   Social History Narrative     Not on file       Current Outpatient Medications   Medication Sig Dispense Refill     calcium carbonate-vitamin D 600-400 MG-UNIT CHEW Take 1 chew tab by mouth       Cyanocobalamin (B-12) 1000 MCG TBCR Take 1,000 mcg by mouth daily 100 tablet 1     DULoxetine HCl 40 MG CPEP Take 40 mg by mouth daily       MULTI-VITAMIN OR TABS 1 TABLET DAILY 30 0     NAPROXEN PO Take 220-440 mg by mouth 2 times daily as needed for moderate pain       Omeprazole (PRILOSEC PO) Take 20 mg by mouth every morning       Pyridoxine HCl (VITAMIN B6 PO) Take 100 mg by mouth daily       Vitamin D, Cholecalciferol, 25 MCG (1000 UT) TABS        benzonatate (TESSALON) 100 MG capsule Take 1 capsule (100 mg) by mouth 3 times daily as needed for cough (Patient not taking: Reported on 10/28/2020) 30 capsule 0     fluticasone propionate (CUTIVATE) 0.005 % ointment Apply 1 g topically 2 times daily (Patient not taking: Reported on 10/28/2020) 1 Tube 11     tolterodine ER (DETROL LA) 4 MG 24 hr  "capsule TAKE 1 CAPSULE BY MOUTH ONCE DAILY (Patient not taking: Reported on 10/28/2020) 90 capsule 3       Allergies   Allergen Reactions     Adhesive Tape      Flagyl [Metronidazole Hcl]      Imidazole antifungals, HIVES & RASH     Nickel      rash       REVIEW OF SYSTEMS:  CONSTITUTIONAL:  Nighttime chills  INTEGUMENTARY/SKIN:  rash  EYES:  NEGATIVE for vision changes or irritation  ENT/MOUTH:  NEGATIVE for ear, mouth and throat problems  RESP:  NEGATIVE for significant cough or SOB  BREAST:  NEGATIVE for masses, tenderness or discharge  CV:  NEGATIVE for chest pain, palpitations or peripheral edema  GI:  Diarrhea, constipation, reflux/heartburn   :  Frequency    MUSCULOSKELETAL:  See HPI above  NEURO:  NEGATIVE for weakness, dizziness or paresthesias  ENDOCRINE:  NEGATIVE for temperature intolerance, skin/hair changes  HEME/ALLERGY/IMMUNE:  NEGATIVE for bleeding problems  PSYCHIATRIC:  Depression, panic attacks      PHYSICAL EXAM:  /64 (BP Location: Right arm, Patient Position: Chair, Cuff Size: Adult Regular)   Ht 1.6 m (5' 3\")   Wt 67.1 kg (148 lb)   BMI 26.22 kg/m    Body mass index is 26.22 kg/m .   GENERAL APPEARANCE: healthy, alert and no distress   HEENT: No apparent thyroid megaly. Clear sclera with normal ocular movement  RESPIRATORY: No labored breathing  SKIN: no suspicious lesions or rashes  NEURO: Normal strength and tone, mentation intact and speech normal  VASCULAR: Good pulses, and capillary refill   LYMPH: no lymphadenopathy   PSYCH:  mentation appears normal and affect normal/bright    MUSCULOSKELETAL:  Not in acute distress  Demonstrates significant difficulty getting up from sitting  She can barely put weight on the left leg  She walks on her tiptoe on the left  No effusion or deformity  No erythema  No significant warmth  Range of motion is limited in deep flexion causing pain mostly at the anterior aspect  Patellofemoral tenderness  Pain is reported in the posterior aspect but not " palpable pain in any structure, particularly the hamstring structures  No specific medial or lateral joint line tenderness  Because of pain with a deep flexion it was impossible to do Gayla's  Ligaments are stable  Extensor mechanism is intact  Circulation is intact     ASSESSMENT:  Acute flareup of chronic patellofemoral DJD  Status post right total knee arthroplasty doing well    PLAN:  The x-ray images from yesterday were visualized.  There is a significant joint space narrowing in the medial patellofemoral facet.  Chondrocalcinosis present but her examination is not impressive with medial and lateral joint line tenderness.  The main cause for pain was acute flareup of underlying chronic patellofemoral joint DJD.  Since she is planning on driving down to [place] for the winter, at this point she wants to go ahead with cortisone injection which was felt very reasonable given the circumstances.  She is already on naproxen.  Continuation naproxen and frequent stops at the rest place while she is traveling to [place] is recommended.  She tolerated cortisone injection well.  Once the lidocaine portion of the injection became effective, she was able to walk much more normally.  Follow-up after she comes back from [place].    Imaging Interpretation:     Recent Results (from the past 744 hour(s))   XR Knee Left 3 Views    Narrative    KNEE LEFT THREE VIEWS   10/28/2020 4:00 PM     HISTORY: No known injury. Acute pain of left knee.    COMPARISON: None.      Impression    IMPRESSION: Mild chondrocalcinosis of the menisci. Joint spaces appear  fairly well preserved except perhaps mild to moderate central  patellofemoral joint space loss. No evidence of acute fracture or  subluxation. Small spur at superior pole of the patella at quadriceps  insertion site. There is a posterior osteophyte seen off the posterior  lip of the tibial plateau that is difficult to localize on the AP  view. No joint effusion.    LUPE ZAMORA,  MD     Large Joint Injection/Arthocentesis: L knee joint    Date/Time: 10/29/2020 8:55 AM  Performed by: Truong Bui MD  Authorized by: Truong Bui MD     Indications:  Pain and osteoarthritis  Needle Size:  22 G  Guidance: landmark guided    Approach:  Anterolateral  Location:  Knee      Medications:  2 mL dexamethasone 120 MG/30ML; 4 mL lidocaine 1 %  Medications comment:  8mL of 1% Lidocaine was injected.  Outcome:  Tolerated well, no immediate complications  Procedure discussed: discussed risks, benefits, and alternatives    Consent Given by:  Patient  Timeout: timeout called immediately prior to procedure            Truong Bui MD  Department of Orthopedic Surgery        Disclaimer: This note consists of symbols derived from keyboarding, dictation and/or voice recognition software. As a result, there may be errors in the script that have gone undetected. Please consider this when interpreting information found in this chart.        Again, thank you for allowing me to participate in the care of your patient.        Sincerely,        Truong Bui MD

## 2020-10-29 NOTE — PATIENT INSTRUCTIONS
1. Left knee DJD    2. Acute pain of left knee      Steroid injection of the left knee was performed today in clinic    - Would not soak in a hot tub, bath or swimming pool for 48 hours  - Ok to shower  - Ice today and only do your normal amounts of activity  - The lidocaine (what is giving you pain relief right now) will likely stop working in 1-2 hours.  You will then have pain again, similar to before you received the injection. The corticosteroid will not start working until approximately 1-2 weeks from now.  In a small percentage of people, cortisone can cause flushing/redness in the face. This usually lasts for 1-3 days and resolves. Cool compress and Ibuprofen/Tylenol can help if this happens.  Can repeat the injection anytime after 4 months    Follow up if pain fails to improve or worsens.     Call with any questions or concerns, [phone].       Move around frequently when you travel to [place]  Icing and naproxen to be continued

## 2020-10-29 NOTE — PROGRESS NOTES
HISTORY OF PRESENT ILLNESS:    Louann Crocker is a 80 year old female who is seen in consultation at the request of Dr. Levine for left knee pain, date of injury: 10/28/20.  Patient reports she moving boxes, and pivoted to turn out of the room. She felt a sharp pain prior. She does note a few days prior she felt the left knee felt usual as if something was moving across the anterior aspect of the knee.   Patient is accompanied by her  today.    Present symptoms: left posterior knee pain, pain with weight bearing, radiation of pain down the shin. Burning sensation radiation on the top of the left foot. Acute swelling that has resolved with ice. Difficulties sleeping in bed due to pain while side lying.  Sensation of instability it the knee.   Current pain level: 8/10,  Worst pain level: 10/10  Treatments tried to this point: icing, naproxen   Orthopedic PMH: right TKA, DOS 2007, low back pain with SI joint fusion, Lumbar fusion L3-4, L4-L5, cervical fusion    Past Medical History:   Diagnosis Date     Anxiety      DDD (degenerative disc disease), cervical      DDD (degenerative disc disease), lumbar      Fibromyalgia      GERD (gastroesophageal reflux disease)      History of blood transfusion      IBS (irritable bowel syndrome)      Major depressive disorder, single episode, severe, without mention of psychotic behavior 2005    ECT x 16       Past Surgical History:   Procedure Laterality Date     APPENDECTOMY       ARTHROSCOPY KNEE RT/LT  2007     BACK SURGERY  5/2013    Sacroileac Fusion     BACK SURGERY  10/6/2014    Lumbar Fusion L3-L-4, L-4L-5     C FUSION MC-P JOINT,SINGLE  2010     C FUSION SHOULDER JOINT  1/01    lap bone sput     C ULLOA W/O FACETEC FORAMOT/DSKC 1/2 VRT SEG, LUMBAR  1993     C LAP,BILIARY TRACT,UNLISTED  1966     C LAPAROSCOPY, SURGICAL; W/ VAGINAL HYSTERECTOMY W/WO REMOVAL OVARY(S)/TUBES  1997    Laparoscopy, Vag Hysterectomy     CATARACT IOL, RT/LT  2008     COLONOSCOPY        ENDOSCOPIC RETROGRADE CHOLANGIOPANCREATOGRAPHY       HC DILATION/CURETTAGE DIAG/THER NON OB  1961    D & C     HC ERCP W SPHINCTEROTOMY      CBD stones     HC REMOVAL GALLBLADDER  1964    Cholecystectomy     HC REPAIR ROTATOR CUFF,ACUTE  3/90     SURGICAL HISTORY OF -       cervical     SURGICAL HISTORY OF -       lumbar      SURGICAL HISTORY OF -       right knee replacement     VITRECTOMY PARSPLANA WITH 25 GAUGE SYSTEM  5/10/2012    Procedure:VITRECTOMY PARSPLANA WITH 25 GAUGE SYSTEM; LEFT EYE 25 GAUGE VITRECTOMY, MEMBRANE STRIPPING, AIR  FLUID EXCHANGE, GAS 15% C3F8; Surgeon:ANTONIETTA WALTERS; Location:[facility]       Family History   Problem Relation Age of Onset     Arthritis Mother         fibromyalgia     Osteoporosis Mother      Cancer Mother         colon     Colon Cancer Mother      Hypertension Father      Cerebrovascular Disease Father      Substance Abuse Father      Substance Abuse Brother      Substance Abuse Son      Colon Cancer Son      Respiratory Brother         emphysema       Social History     Socioeconomic History     Marital status:      Spouse name: Amador     Number of children: 4     Years of education: Not on file     Highest education level: Not on file   Occupational History     Employer: RETIRED     Comment: self employed   Social Needs     Financial resource strain: Not on file     Food insecurity     Worry: Not on file     Inability: Not on file     Transportation needs     Medical: Not on file     Non-medical: Not on file   Tobacco Use     Smoking status: Former Smoker     Packs/day: 1.00     Years: 20.00     Pack years: 20.00     Types: Cigarettes     Quit date: 1977     Years since quittin.8     Smokeless tobacco: Never Used   Substance and Sexual Activity     Alcohol use: Yes     Comment: on occasion     Drug use: No     Sexual activity: Yes     Partners: Male     Birth control/protection: Surgical   Lifestyle     Physical activity     Days per  week: Not on file     Minutes per session: Not on file     Stress: Not on file   Relationships     Social connections     Talks on phone: Not on file     Gets together: Not on file     Attends Jainism service: Not on file     Active member of club or organization: Not on file     Attends meetings of clubs or organizations: Not on file     Relationship status: Not on file     Intimate partner violence     Fear of current or ex partner: Not on file     Emotionally abused: Not on file     Physically abused: Not on file     Forced sexual activity: Not on file   Other Topics Concern     Parent/sibling w/ CABG, MI or angioplasty before 65F 55M? Not Asked   Social History Narrative     Not on file       Current Outpatient Medications   Medication Sig Dispense Refill     calcium carbonate-vitamin D 600-400 MG-UNIT CHEW Take 1 chew tab by mouth       Cyanocobalamin (B-12) 1000 MCG TBCR Take 1,000 mcg by mouth daily 100 tablet 1     DULoxetine HCl 40 MG CPEP Take 40 mg by mouth daily       MULTI-VITAMIN OR TABS 1 TABLET DAILY 30 0     NAPROXEN PO Take 220-440 mg by mouth 2 times daily as needed for moderate pain       Omeprazole (PRILOSEC PO) Take 20 mg by mouth every morning       Pyridoxine HCl (VITAMIN B6 PO) Take 100 mg by mouth daily       Vitamin D, Cholecalciferol, 25 MCG (1000 UT) TABS        benzonatate (TESSALON) 100 MG capsule Take 1 capsule (100 mg) by mouth 3 times daily as needed for cough (Patient not taking: Reported on 10/28/2020) 30 capsule 0     fluticasone propionate (CUTIVATE) 0.005 % ointment Apply 1 g topically 2 times daily (Patient not taking: Reported on 10/28/2020) 1 Tube 11     tolterodine ER (DETROL LA) 4 MG 24 hr capsule TAKE 1 CAPSULE BY MOUTH ONCE DAILY (Patient not taking: Reported on 10/28/2020) 90 capsule 3       Allergies   Allergen Reactions     Adhesive Tape      Flagyl [Metronidazole Hcl]      Imidazole antifungals, HIVES & RASH     Nickel      rash       REVIEW OF  "SYSTEMS:  CONSTITUTIONAL:  Nighttime chills  INTEGUMENTARY/SKIN:  rash  EYES:  NEGATIVE for vision changes or irritation  ENT/MOUTH:  NEGATIVE for ear, mouth and throat problems  RESP:  NEGATIVE for significant cough or SOB  BREAST:  NEGATIVE for masses, tenderness or discharge  CV:  NEGATIVE for chest pain, palpitations or peripheral edema  GI:  Diarrhea, constipation, reflux/heartburn   :  Frequency    MUSCULOSKELETAL:  See HPI above  NEURO:  NEGATIVE for weakness, dizziness or paresthesias  ENDOCRINE:  NEGATIVE for temperature intolerance, skin/hair changes  HEME/ALLERGY/IMMUNE:  NEGATIVE for bleeding problems  PSYCHIATRIC:  Depression, panic attacks      PHYSICAL EXAM:  /64 (BP Location: Right arm, Patient Position: Chair, Cuff Size: Adult Regular)   Ht 1.6 m (5' 3\")   Wt 67.1 kg (148 lb)   BMI 26.22 kg/m    Body mass index is 26.22 kg/m .   GENERAL APPEARANCE: healthy, alert and no distress   HEENT: No apparent thyroid megaly. Clear sclera with normal ocular movement  RESPIRATORY: No labored breathing  SKIN: no suspicious lesions or rashes  NEURO: Normal strength and tone, mentation intact and speech normal  VASCULAR: Good pulses, and capillary refill   LYMPH: no lymphadenopathy   PSYCH:  mentation appears normal and affect normal/bright    MUSCULOSKELETAL:  Not in acute distress  Demonstrates significant difficulty getting up from sitting  She can barely put weight on the left leg  She walks on her tiptoe on the left  No effusion or deformity  No erythema  No significant warmth  Range of motion is limited in deep flexion causing pain mostly at the anterior aspect  Patellofemoral tenderness  Pain is reported in the posterior aspect but not palpable pain in any structure, particularly the hamstring structures  No specific medial or lateral joint line tenderness  Because of pain with a deep flexion it was impossible to do Gayla's  Ligaments are stable  Extensor mechanism is intact  Circulation is " intact     ASSESSMENT:  Acute flareup of chronic patellofemoral DJD  Status post right total knee arthroplasty doing well    PLAN:  The x-ray images from yesterday were visualized.  There is a significant joint space narrowing in the medial patellofemoral facet.  Chondrocalcinosis present but her examination is not impressive with medial and lateral joint line tenderness.  The main cause for pain was acute flareup of underlying chronic patellofemoral joint DJD.  Since she is planning on driving down to [place] for the winter, at this point she wants to go ahead with cortisone injection which was felt very reasonable given the circumstances.  She is already on naproxen.  Continuation naproxen and frequent stops at the rest place while she is traveling to [place] is recommended.  She tolerated cortisone injection well.  Once the lidocaine portion of the injection became effective, she was able to walk much more normally.  Follow-up after she comes back from [place].    Imaging Interpretation:     Recent Results (from the past 744 hour(s))   XR Knee Left 3 Views    Narrative    KNEE LEFT THREE VIEWS   10/28/2020 4:00 PM     HISTORY: No known injury. Acute pain of left knee.    COMPARISON: None.      Impression    IMPRESSION: Mild chondrocalcinosis of the menisci. Joint spaces appear  fairly well preserved except perhaps mild to moderate central  patellofemoral joint space loss. No evidence of acute fracture or  subluxation. Small spur at superior pole of the patella at quadriceps  insertion site. There is a posterior osteophyte seen off the posterior  lip of the tibial plateau that is difficult to localize on the AP  view. No joint effusion.    LUPE ZAMORA MD     Large Joint Injection/Arthocentesis: L knee joint    Date/Time: 10/29/2020 8:55 AM  Performed by: Truong Bui MD  Authorized by: Truong Bui MD     Indications:  Pain and osteoarthritis  Needle Size:  22 G  Guidance: landmark guided    Approach:   Anterolateral  Location:  Knee      Medications:  2 mL dexamethasone 120 MG/30ML; 4 mL lidocaine 1 %  Medications comment:  8mL of 1% Lidocaine was injected.  Outcome:  Tolerated well, no immediate complications  Procedure discussed: discussed risks, benefits, and alternatives    Consent Given by:  Patient  Timeout: timeout called immediately prior to procedure            Truong Bui MD  Department of Orthopedic Surgery        Disclaimer: This note consists of symbols derived from keyboarding, dictation and/or voice recognition software. As a result, there may be errors in the script that have gone undetected. Please consider this when interpreting information found in this chart.

## 2021-01-09 ENCOUNTER — HEALTH MAINTENANCE LETTER (OUTPATIENT)
Age: 81
End: 2021-01-09

## 2021-05-08 ENCOUNTER — HEALTH MAINTENANCE LETTER (OUTPATIENT)
Age: 81
End: 2021-05-08

## 2021-09-19 ENCOUNTER — MYC MEDICAL ADVICE (OUTPATIENT)
Dept: FAMILY MEDICINE | Facility: CLINIC | Age: 81
End: 2021-09-19

## 2021-09-22 ENCOUNTER — OFFICE VISIT (OUTPATIENT)
Dept: FAMILY MEDICINE | Facility: CLINIC | Age: 81
End: 2021-09-22
Payer: MEDICARE

## 2021-09-22 VITALS
RESPIRATION RATE: 12 BRPM | OXYGEN SATURATION: 99 % | HEART RATE: 78 BPM | BODY MASS INDEX: 26.39 KG/M2 | WEIGHT: 149 LBS | DIASTOLIC BLOOD PRESSURE: 80 MMHG | SYSTOLIC BLOOD PRESSURE: 122 MMHG | TEMPERATURE: 98.2 F

## 2021-09-22 DIAGNOSIS — N89.8 VAGINAL LESION: ICD-10-CM

## 2021-09-22 DIAGNOSIS — R30.0 DYSURIA: Primary | ICD-10-CM

## 2021-09-22 LAB
ALBUMIN UR-MCNC: NEGATIVE MG/DL
APPEARANCE UR: CLEAR
BILIRUB UR QL STRIP: NEGATIVE
CLUE CELLS: ABNORMAL
COLOR UR AUTO: YELLOW
GLUCOSE UR STRIP-MCNC: NEGATIVE MG/DL
HGB UR QL STRIP: NEGATIVE
KETONES UR STRIP-MCNC: NEGATIVE MG/DL
LEUKOCYTE ESTERASE UR QL STRIP: NEGATIVE
NITRATE UR QL: NEGATIVE
PH UR STRIP: 5.5 [PH] (ref 5–7)
SP GR UR STRIP: 1.01 (ref 1–1.03)
TRICHOMONAS, WET PREP: ABNORMAL
UROBILINOGEN UR STRIP-ACNC: 0.2 E.U./DL
WBC'S/HIGH POWER FIELD, WET PREP: ABNORMAL
YEAST, WET PREP: ABNORMAL

## 2021-09-22 PROCEDURE — 81003 URINALYSIS AUTO W/O SCOPE: CPT | Performed by: PHYSICIAN ASSISTANT

## 2021-09-22 PROCEDURE — 99213 OFFICE O/P EST LOW 20 MIN: CPT | Performed by: PHYSICIAN ASSISTANT

## 2021-09-22 PROCEDURE — 87210 SMEAR WET MOUNT SALINE/INK: CPT | Performed by: PHYSICIAN ASSISTANT

## 2021-09-22 RX ORDER — MULTIVIT-MIN/IRON/FOLIC ACID/K 18-600-40
1000 CAPSULE ORAL
COMMUNITY

## 2021-09-22 RX ORDER — DULOXETIN HYDROCHLORIDE 20 MG/1
CAPSULE, DELAYED RELEASE ORAL
COMMUNITY
Start: 2021-08-10

## 2021-09-22 RX ORDER — VIT C/B6/B5/MAGNESIUM/HERB 173 50-5-6-5MG
CAPSULE ORAL
COMMUNITY

## 2021-09-22 NOTE — PATIENT INSTRUCTIONS
Today's lab results does not show a bladder infection.    Bladder irritants also can cause symptoms similar to a bladder infection. Bladder irritants include: caffeine (tea and coffee), alcohol, apple juice, lemon juice, soy sauce, carbonated drinks, pineapple, strawberries, tomatoes, yogurt and vinegar.

## 2021-09-22 NOTE — PROGRESS NOTES
"    Assessment & Plan     Dysuria  UA and wet prep negative. Reviewed bladder irritants and provided list.  - UA Macro with Reflex to Micro and Culture - lab collect; Future  - Wet prep - lab collect; Future  - UA Macro with Reflex to Micro and Culture - lab collect  - Wet prep - lab collect    Vaginal lesion  Follow up with OB/GYN unclear cause for papular like lesion.  - Ob/Gyn Referral; Future    Review of the result(s) of each unique test - UA, Wet prep  Ordering of each unique test  22 minutes spent on the date of the encounter doing chart review, history and exam, documentation and further activities per the note      Patient Instructions   Today's lab results does not show a bladder infection.    Bladder irritants also can cause symptoms similar to a bladder infection. Bladder irritants include: caffeine (tea and coffee), alcohol, apple juice, lemon juice, soy sauce, carbonated drinks, pineapple, strawberries, tomatoes, yogurt and vinegar.        Return if symptoms worsen or fail to improve.    Naomi Hannon PA-C  [facility]    Cristina Abbott is a 81 year old who presents for the following health issues     HPI     Genitourinary - Female  Onset/Duration: 1 month (started having \"pimples\" in the vaginal area)  Description:   Painful urination (Dysuria): YES- started 2 weeks ago           Frequency: no  Blood in urine (Hematuria): no  Delay in urine (Hesitency): no  Intensity: moderate  Progression of Symptoms:  improving  Accompanying Signs & Symptoms:  Fever/chills: no  Flank pain: no  Nausea and vomiting: no  Vaginal symptoms: sores  Abdominal/Pelvic Pain: no  History:   History of frequent UTI s: YES  History of kidney stones: no  Sexually Active: YES  Possibility of pregnancy: No  Precipitating or alleviating factors: None  Therapies tried and outcome: hydrocortisone    She notes she had a \"pimple\" on the right inside of the labia. This went away but then she had another " one occur on the left inside of the labia. This also has improved with topical hydrocortisone. New one on the right labia again.      Review of Systems   GENERAL:  No fevers        Objective    /80 (BP Location: Right arm, Patient Position: Chair, Cuff Size: Adult Regular)   Pulse 78   Temp 98.2  F (36.8  C) (Oral)   Resp 12   Wt 67.6 kg (149 lb)   SpO2 99%   BMI 26.39 kg/m    Body mass index is 26.39 kg/m .  Physical Exam   GENERAL: No acute distress  HEENT: Normocephalic  SKIN: 1 mm slightly raised mildly erythematous papule (not fluid filled or pustular in appearance), right inner/inferior labia majora. Slightly tender to palpation.  NEURO: Alert and non-focal      Results for orders placed or performed in visit on 09/22/21 (from the past 24 hour(s))   UA Macro with Reflex to Micro and Culture - lab collect    Specimen: Urine, Clean Catch   Result Value Ref Range    Color Urine Yellow Colorless, Straw, Light Yellow, Yellow    Appearance Urine Clear Clear    Glucose Urine Negative Negative mg/dL    Bilirubin Urine Negative Negative    Ketones Urine Negative Negative mg/dL    Specific Gravity Urine 1.015 1.003 - 1.035    Blood Urine Negative Negative    pH Urine 5.5 5.0 - 7.0    Protein Albumin Urine Negative Negative mg/dL    Urobilinogen Urine 0.2 0.2, 1.0 E.U./dL    Nitrite Urine Negative Negative    Leukocyte Esterase Urine Negative Negative    Narrative    Microscopic not indicated   Wet prep - lab collect    Specimen: Vagina; Swab   Result Value Ref Range    Trichomonas Absent Absent    Yeast Absent Absent    Clue Cells Absent Absent    WBCs/high power field 1+ (A) None

## 2021-10-07 ENCOUNTER — OFFICE VISIT (OUTPATIENT)
Dept: OBGYN | Facility: CLINIC | Age: 81
End: 2021-10-07
Payer: MEDICARE

## 2021-10-07 VITALS
DIASTOLIC BLOOD PRESSURE: 70 MMHG | WEIGHT: 150 LBS | HEIGHT: 63 IN | SYSTOLIC BLOOD PRESSURE: 126 MMHG | BODY MASS INDEX: 26.58 KG/M2

## 2021-10-07 DIAGNOSIS — N90.89 VULVAR LESION: Primary | ICD-10-CM

## 2021-10-07 PROCEDURE — 99212 OFFICE O/P EST SF 10 MIN: CPT | Performed by: OBSTETRICS & GYNECOLOGY

## 2021-10-07 ASSESSMENT — MIFFLIN-ST. JEOR: SCORE: 1114.53

## 2021-10-07 NOTE — NURSING NOTE
"Chief Complaint   Patient presents with     Vaginal Problem     right side pimple       Initial /70 (BP Location: Right arm, Patient Position: Chair, Cuff Size: Adult Regular)   Ht 1.6 m (5' 3\")   Wt 68 kg (150 lb)   Breastfeeding No   BMI 26.57 kg/m   Estimated body mass index is 26.57 kg/m  as calculated from the following:    Height as of this encounter: 1.6 m (5' 3\").    Weight as of this encounter: 68 kg (150 lb).  BP completed using cuff size: regular    Questioned patient about current smoking habits.  Pt. has never smoked.          The following HM Due: NONE    Marianne Correia CMA    "

## 2021-10-10 NOTE — PROGRESS NOTES
HPI:  Louann Crocker is a 81 year old white female  postmenopausal not on hormone replacement therapy status post vaginal hysterectomy with BSO for menorrhagia in approximately  when she was in her 50s, who I am asked to see by MARK Polo  for evaluation of a papular vulvar lesion noted on recent exam on 2021.  At the time of that visit the patient was being evaluated for dysuria.  A UA and wet prep were done and were negative and fluid management and elimination of bladder irritants were discussed.  I last saw this patient on 10/15/2018 for evaluation of urge incontinence and started her on Detrol LA 4 mg daily.  The patient states that her urinary symptoms abated and she stopped Detrol years ago.  She denies symptoms to suggest stress incontinence and states that leakage is not a major concern for her she wears a liner only if she goes out in public.  She reyna in [place] with her  and sees dermatology there routinely.  Recently in the shower she noticed a pea-sized lesion on the right labia.  She states that she has had this for a long time used hydrocortisone and it resolved.  A while later she had a similar lesion on the left treated with hydrocortisone it resolved and now she feels like it is been a recurrence of one on the right in the previous area.  She has a history of irritable bowel syndrome history of hemorrhoids and treats it with hydrocortisone and stool softeners.  Her  is a retired pharmacist.  She denies any vaginal bleeding or related irritation..  I reviewed her recent lab work    Past Medical History:   Diagnosis Date     Anxiety      DDD (degenerative disc disease), cervical      DDD (degenerative disc disease), lumbar      Fibromyalgia      GERD (gastroesophageal reflux disease)      History of blood transfusion      IBS (irritable bowel syndrome)      Major depressive disorder, single episode, severe, without mention of psychotic behavior      ECT x 16     Past Surgical History:   Procedure Laterality Date     APPENDECTOMY       ARTHROSCOPY KNEE RT/LT  2007     BACK SURGERY  5/2013    Sacroileac Fusion     BACK SURGERY  10/6/2014    Lumbar Fusion L3-L-4, L-4L-5     C FUSION MC-P JOINT,SINGLE  2010     C FUSION SHOULDER JOINT  1/01    lap bone sput     C ULLOA W/O FACETEC FORAMOT/DSKC 1/2 VRT SEG, LUMBAR  1993     C LAP,BILIARY TRACT,UNLISTED  1966     C LAPAROSCOPY, SURGICAL; W/ VAGINAL HYSTERECTOMY W/WO REMOVAL OVARY(S)/TUBES  1997    Laparoscopy, Vag Hysterectomy     CATARACT IOL, RT/LT  2008     COLONOSCOPY       ENDOSCOPIC RETROGRADE CHOLANGIOPANCREATOGRAPHY  2005     HC DILATION/CURETTAGE DIAG/THER NON OB  1961    D & C     HC ERCP W SPHINCTEROTOMY  12/09    CBD stones     HC REMOVAL GALLBLADDER  1964    Cholecystectomy     HC REPAIR ROTATOR CUFF,ACUTE  3/90     SURGICAL HISTORY OF -   2005    cervical     SURGICAL HISTORY OF -   2005    lumbar      SURGICAL HISTORY OF -   2008    right knee replacement     VITRECTOMY PARSPLANA WITH 25 GAUGE SYSTEM  5/10/2012    Procedure:VITRECTOMY PARSPLANA WITH 25 GAUGE SYSTEM; LEFT EYE 25 GAUGE VITRECTOMY, MEMBRANE STRIPPING, AIR  FLUID EXCHANGE, GAS 15% C3F8; Surgeon:ANTONIETTA WALTERS; Location:[facility]     Family History   Problem Relation Age of Onset     Arthritis Mother         fibromyalgia     Osteoporosis Mother      Cancer Mother         colon     Colon Cancer Mother      Hypertension Father      Cerebrovascular Disease Father      Substance Abuse Father      Substance Abuse Brother      Substance Abuse Son      Colon Cancer Son      Respiratory Brother         emphysema     Social History     Socioeconomic History     Marital status:      Spouse name: Amador     Number of children: 4     Years of education: Not on file     Highest education level: Not on file   Occupational History     Employer: RETIRED     Comment: self employed   Tobacco Use     Smoking status: Former Smoker     Packs/day: 1.00      Years: 20.00     Pack years: 20.00     Types: Cigarettes     Quit date: 1977     Years since quittin.8     Smokeless tobacco: Never Used   Substance and Sexual Activity     Alcohol use: Yes     Comment: on occasion     Drug use: No     Sexual activity: Yes     Partners: Male     Birth control/protection: Surgical   Other Topics Concern     Parent/sibling w/ CABG, MI or angioplasty before 65F 55M? Not Asked   Social History Narrative     Not on file     Social Determinants of Health     Financial Resource Strain:      Difficulty of Paying Living Expenses:    Food Insecurity:      Worried About Running Out of Food in the Last Year:      Ran Out of Food in the Last Year:    Transportation Needs:      Lack of Transportation (Medical):      Lack of Transportation (Non-Medical):    Physical Activity:      Days of Exercise per Week:      Minutes of Exercise per Session:    Stress:      Feeling of Stress :    Social Connections:      Frequency of Communication with Friends and Family:      Frequency of Social Gatherings with Friends and Family:      Attends Latter-day Services:      Active Member of Clubs or Organizations:      Attends Club or Organization Meetings:      Marital Status:    Intimate Partner Violence:      Fear of Current or Ex-Partner:      Emotionally Abused:      Physically Abused:      Sexually Abused:        Allergies:  Adhesive tape, Flagyl [metronidazole hcl], and Nickel    Current Outpatient Medications   Medication Sig Dispense Refill     Ascorbic Acid (VITAMIN C) 500 MG CAPS Take 1,000 mg by mouth       aspirin (ASA) 81 MG EC tablet 1   Daily       Cyanocobalamin (B-12) 1000 MCG TBCR Take 1,000 mcg by mouth daily 100 tablet 1     diclofenac (VOLTAREN) 1 % topical gel Apply topically as needed       DULoxetine (CYMBALTA) 20 MG capsule TAKE 1 CAPSULE BY MOUTH THREE TIMES DAILY       MULTI-VITAMIN OR TABS 1 TABLET DAILY 30 0     NAPROXEN PO Take 220-440 mg by mouth 2 times daily as needed for  "moderate pain       Omeprazole (PRILOSEC PO) Take 20 mg by mouth every morning       Pyridoxine HCl (VITAMIN B6 PO) Take 100 mg by mouth daily       Turmeric 500 MG CAPS        Vitamin D, Cholecalciferol, 25 MCG (1000 UT) TABS          Review Of Systems   ROS: 10 point ROS neg other than the symptoms noted above in the HPI.    Exam:  /70 (BP Location: Right arm, Patient Position: Chair, Cuff Size: Adult Regular)   Ht 1.6 m (5' 3\")   Wt 68 kg (150 lb)   Breastfeeding No   BMI 26.57 kg/m    {Constitutional: healthy, alert and no distress  Genitourinary: EG BUS reveals vulvar varicosities on the right and left labia majora and minora.  No evidence of thrombosis.  When I asked the patient to point to the pimple that she felt she pointed to these varicosities.  Patient also has copious hemorrhoidal tissue but no evidence of thrombosis.  Speculum exam mildly atrophic vaginal epithelium absent cervix good anterior apical and posterior compartment support.  Bimanual exam the uterus is absent adnexa without masses enlargement or tenderness.  Rectovaginal exam reveals acceptable posterior compartment support with normal sphincter tone    Assessment/Plan:  (N90.89) Vulvar lesion  (primary encounter diagnosis)  Comment: I believe the lesion she is referring to is a vulvar varicosity.  This certainly would fit with her historical account.  I do not see evidence of other disease or infectious etiology.  She denies any history of HSV.  Plan: I gave the patient a detailed written outline of my thoughts and findings and recommendations and cares for these lesions.  Patient understands accepts and will call as needed concerns arise      Raudel Kan M.D.           "

## 2021-10-10 NOTE — PATIENT INSTRUCTIONS
You can reach your [facility] Care Team any time of the day by calling [phone]. This number will put you in touch with the 24 hour nurse line if the clinic is closed.    To contact your OB/GYN Surgery Scheduler please call [phone]. This is a direct number for your care team between 8 a.m. and 4 p.m. Monday through Friday.    [facility] is open for your convenience: [phone]  Monday through Friday 8 a.m. to 8:30 p.m.  Saturday 9 a.m. to 6 p.m.  Sunday Noon to 6 p.m.    They are closed on all major holidays.

## 2021-10-23 ENCOUNTER — HEALTH MAINTENANCE LETTER (OUTPATIENT)
Age: 81
End: 2021-10-23

## 2022-06-04 ENCOUNTER — HEALTH MAINTENANCE LETTER (OUTPATIENT)
Age: 82
End: 2022-06-04

## 2022-08-18 ENCOUNTER — TRANSFERRED RECORDS (OUTPATIENT)
Dept: HEALTH INFORMATION MANAGEMENT | Facility: CLINIC | Age: 82
End: 2022-08-18

## 2022-08-23 ENCOUNTER — OFFICE VISIT (OUTPATIENT)
Dept: URGENT CARE | Facility: URGENT CARE | Age: 82
End: 2022-08-23
Payer: MEDICARE

## 2022-08-23 VITALS
BODY MASS INDEX: 26.57 KG/M2 | RESPIRATION RATE: 16 BRPM | OXYGEN SATURATION: 98 % | SYSTOLIC BLOOD PRESSURE: 136 MMHG | TEMPERATURE: 98.3 F | HEART RATE: 72 BPM | DIASTOLIC BLOOD PRESSURE: 74 MMHG | WEIGHT: 150 LBS

## 2022-08-23 DIAGNOSIS — L08.9 WOUND INFECTION: ICD-10-CM

## 2022-08-23 DIAGNOSIS — M79.89 SWELLING OF LEFT LOWER EXTREMITY: ICD-10-CM

## 2022-08-23 DIAGNOSIS — T14.8XXA WOUND INFECTION: ICD-10-CM

## 2022-08-23 DIAGNOSIS — L03.113 CELLULITIS OF RIGHT WRIST: Primary | ICD-10-CM

## 2022-08-23 PROCEDURE — 99214 OFFICE O/P EST MOD 30 MIN: CPT | Performed by: PHYSICIAN ASSISTANT

## 2022-08-23 RX ORDER — CEPHALEXIN 500 MG/1
500 CAPSULE ORAL 4 TIMES DAILY
Qty: 28 CAPSULE | Refills: 0 | Status: SHIPPED | OUTPATIENT
Start: 2022-08-23 | End: 2022-08-30

## 2022-08-23 NOTE — PROGRESS NOTES
Assessment/Plan:    Suspect wound infection which led to cellulitis of the R wrist. Rx Keflex. Return if not improving in 2-3 days.     No erythema or tenderness to LLE to suggest sprain/fracture, gout or cellulitis. NVI. Pt has varicose veins, suspect venous insufficiency but given unilateral nature of her swelling, have arranged for her to be seen at the [facility] tomorrow for an ultrasound to rule out DVT. Pt has two trips coming up and would like to have this checked out beforehand.    See patient instructions below.    At the end of the encounter, I discussed results, diagnosis, medications. Discussed red flags for immediate return to clinic/ER, as well as indications for follow up if no improvement. Patient understood and agreed to plan. Patient was stable for discharge.      ICD-10-CM    1. Cellulitis of right wrist  L03.113 cephALEXin (KEFLEX) 500 MG capsule   2. Wound infection  T14.8XXA     L08.9    3. Swelling of left lower extremity  M79.89          Return in about 1 day (around 2022).    DENISE England, PAGRACE  [facility]  -----------------------------------------------------------------------------------------------------------------------------------------------------    HPI:  Louann Crocker is a 81 year old female who presents for evaluation of the followin. R wrist pain and erythema onset 2 weeks ago. She notes getting a skin tear after bumping her wrist against the corner of a cardboard box 3 weeks ago, and the wound took a while to scab over. It kept getting opened back up. Eventually the area started to become painful and red, and has been progressively worsening in the last few days. No treatments tried. Patient reports no fever/chills, drainage, or wrist swelling. She does have osteoarthritis in her hand so has some pain in her hand/fingers, but believes this is due to the arthritis.    2. LLE swelling onset 1 week ago, primarily in the ankle. No  injury. She has no pain or tenderness, warmth, or erythema. Patient reports no history of DVT/PE, recent travel/surgery, estrogen use, tobacco use,  or history of cancer.    Past Medical History:   Diagnosis Date     Anxiety      DDD (degenerative disc disease), cervical      DDD (degenerative disc disease), lumbar      Fibromyalgia      GERD (gastroesophageal reflux disease)      History of blood transfusion      IBS (irritable bowel syndrome)      Major depressive disorder, single episode, severe, without mention of psychotic behavior 2005    ECT x 16       Vitals:    08/23/22 1755   BP: 136/74   BP Location: Right arm   Patient Position: Chair   Cuff Size: Adult Regular   Pulse: 72   Resp: 16   Temp: 98.3  F (36.8  C)   TempSrc: Oral   SpO2: 98%   Weight: 68 kg (150 lb)       Physical Exam  Vitals and nursing note reviewed.   Cardiovascular:      Pulses:           Dorsalis pedis pulses are 2+ on the left side.   Pulmonary:      Effort: Pulmonary effort is normal.   Musculoskeletal:      Right wrist: Tenderness (radial aspect of wrist, adjacent to scabbed over wound, with approx 6 cm surrounding erythema) present. Normal range of motion.      Left lower leg: No tenderness. 1+ Pitting Edema (ankle and proximal foot) present.      Comments: No purulent drainage, fluctuance, or induration   Neurological:      Mental Status: She is alert.         Labs/Imaging:  No results found for this or any previous visit (from the past 24 hour(s)).  No results found for this or any previous visit (from the past 24 hour(s)).        Patient Instructions   Go to [facility] at 9:15am tomorrow morning for your leg swelling.     -Complete antibiotics as prescribed. Take with food to help prevent stomach upset.   -Elevate the affected limb as much as possible. This will help keep the swelling down.  -Keep the infected area clean; warm water soak with mild soap for 10-15 minutes 3 times a day. Pat dry.  -Take Tylenol 650mg every 4 hours or  ibuprofen 600mg every 6 hours by mouth for pain/fever.  Do not exceed 4000mg of acetaminophen or 2400mg of ibuprofen from any source in a 24 hour period.  Taking Tylenol and ibuprofen together may be helpful in reducing pain.   -If develop a fever, increased redness, pain, drainage or swelling from the area, should contact primary care clinic/provider.  -Symptoms should be improving within 48 hours and resolved in next 7-10 days.

## 2022-08-23 NOTE — PATIENT INSTRUCTIONS
Go to [facility] at 9:15am tomorrow morning for your leg swelling.     -Complete antibiotics as prescribed. Take with food to help prevent stomach upset.   -Elevate the affected limb as much as possible. This will help keep the swelling down.  -Keep the infected area clean; warm water soak with mild soap for 10-15 minutes 3 times a day. Pat dry.  -Take Tylenol 650mg every 4 hours or ibuprofen 600mg every 6 hours by mouth for pain/fever.  Do not exceed 4000mg of acetaminophen or 2400mg of ibuprofen from any source in a 24 hour period.  Taking Tylenol and ibuprofen together may be helpful in reducing pain.   -If develop a fever, increased redness, pain, drainage or swelling from the area, should contact primary care clinic/provider.  -Symptoms should be improving within 48 hours and resolved in next 7-10 days.

## 2022-08-24 ENCOUNTER — OFFICE VISIT (OUTPATIENT)
Dept: PEDIATRICS | Facility: CLINIC | Age: 82
End: 2022-08-24
Attending: PHYSICIAN ASSISTANT
Payer: MEDICARE

## 2022-08-24 ENCOUNTER — HOSPITAL ENCOUNTER (OUTPATIENT)
Dept: ULTRASOUND IMAGING | Facility: CLINIC | Age: 82
Discharge: HOME | End: 2022-08-24
Attending: NURSE PRACTITIONER | Admitting: NURSE PRACTITIONER
Payer: MEDICARE

## 2022-08-24 VITALS
SYSTOLIC BLOOD PRESSURE: 172 MMHG | DIASTOLIC BLOOD PRESSURE: 73 MMHG | HEIGHT: 63 IN | BODY MASS INDEX: 26.58 KG/M2 | TEMPERATURE: 98.1 F | OXYGEN SATURATION: 100 % | HEART RATE: 66 BPM | WEIGHT: 150 LBS | RESPIRATION RATE: 16 BRPM

## 2022-08-24 DIAGNOSIS — R60.0 EDEMA OF LEFT LOWER EXTREMITY: Primary | ICD-10-CM

## 2022-08-24 DIAGNOSIS — R60.0 EDEMA OF LEFT LOWER EXTREMITY: ICD-10-CM

## 2022-08-24 DIAGNOSIS — M79.605 PAIN OF LEFT LOWER EXTREMITY: ICD-10-CM

## 2022-08-24 PROCEDURE — 93971 EXTREMITY STUDY: CPT | Mod: LT

## 2022-08-24 PROCEDURE — 99213 OFFICE O/P EST LOW 20 MIN: CPT | Performed by: NURSE PRACTITIONER

## 2022-08-24 NOTE — PATIENT INSTRUCTIONS
Results for orders placed or performed during the hospital encounter of 08/24/22   US Lower Extremity Venous Duplex Left     Status: None (Preliminary result)    Narrative    VENOUS ULTRASOUND LEFT LOWER EXTREMITY  8/24/2022 10:21 AM     HISTORY: Edema of left lower extremity.    COMPARISON: None.    TECHNIQUE: Color Doppler and spectral waveform analysis performed  throughout the deep veins of the left lower extremity.    FINDINGS: The left common femoral, proximal greater saphenous,  femoral, and popliteal veins demonstrate normal blood flow,  compression, and augmentation. Posterior tibial and peroneal veins are  compressible. Contralateral right common femoral vein is patent.      Impression    IMPRESSION: Negative for DVT in the left lower extremity.

## 2022-08-24 NOTE — PROGRESS NOTES
"  Assessment & Plan     Edema of left lower extremity  - US Lower Extremity Venous Duplex Left; Future    Pain of left lower extremity      US negative for DVT.   Monitor.reduce sodium in diet  Elevate legs when seated and will f/u with PCP if persists or worsens.        Patient Instructions     Results for orders placed or performed during the hospital encounter of 08/24/22   US Lower Extremity Venous Duplex Left     Status: None (Preliminary result)    Narrative    VENOUS ULTRASOUND LEFT LOWER EXTREMITY  8/24/2022 10:21 AM     HISTORY: Edema of left lower extremity.    COMPARISON: None.    TECHNIQUE: Color Doppler and spectral waveform analysis performed  throughout the deep veins of the left lower extremity.    FINDINGS: The left common femoral, proximal greater saphenous,  femoral, and popliteal veins demonstrate normal blood flow,  compression, and augmentation. Posterior tibial and peroneal veins are  compressible. Contralateral right common femoral vein is patent.      Impression    IMPRESSION: Negative for DVT in the left lower extremity.            BMI:   Estimated body mass index is 26.57 kg/m  as calculated from the following:    Height as of this encounter: 1.6 m (5' 3\").    Weight as of this encounter: 68 kg (150 lb).         Return in about 1 week (around 8/31/2022) for with regular provider if symptoms persist.    SANTOS Acuña CNP  [facility]    Cristina Abbott is a 81 year old, presenting  urgently to [facility] by referral from Trisha MOHAMUD to rule out DVT   for the following health issues:  Musculoskeletal Problem (R/O left leg DVT)      HPI     Musculoskeletal problem/pain  Onset/Duration: 2 weeks  Description  Location: leg - left  Joint Swelling: no   Redness: no   Pain: mild  Warmth: no   Progression of Symptoms:  worse  Accompanying signs and symptoms:   Fevers: no   Numbness/tingling/weakness: no   History  Trauma to the area: no   Previous history of Gout: " "no    Alcohol usage: none  Diuretic Use: no   Recent illness:  no   Previous similar problem:no    Previous evaluation:  YES  Aggravating factors include: walking  Therapies tried and outcome:  nothing      Objective    BP (!) 159/80   Pulse 75   Temp 98.2  F (36.8  C) (Oral)   Resp 16   Ht 1.6 m (5' 3\")   Wt 68 kg (150 lb)   SpO2 96%   BMI 26.57 kg/m    Body mass index is 26.57 kg/m .  Physical Exam   GENERAL: healthy, alert and no distress  RESP: lungs clear to auscultation - no rales, rhonchi or wheezes  CV: regular rate and rhythm, normal S1 S2, no S3 or S4, no murmur, click or rub, no peripheral edema and peripheral pulses strong  MS: LLE exam shows normal strength and muscle mass, no deformities, no evidence of joint effusion, ROM of all joints is normal and medial ankle pain, no erythema or edema  SKIN: no suspicious lesions or rashes                 .  ..  "

## 2022-09-21 ENCOUNTER — TELEPHONE (OUTPATIENT)
Dept: FAMILY MEDICINE | Facility: CLINIC | Age: 82
End: 2022-09-21

## 2022-09-21 NOTE — TELEPHONE ENCOUNTER
Patient Quality Outreach    Patient is due for the following:   Depression  -  PHQ-9 needed  Physical Annual Wellness Visit    Next Steps:   Patient has upcoming appointment, these items will be addressed at that time.    Type of outreach:    Chart review performed, no outreach needed.      Questions for provider review:    None     Manjit Morrison

## 2022-09-29 ENCOUNTER — OFFICE VISIT (OUTPATIENT)
Dept: INTERNAL MEDICINE | Facility: CLINIC | Age: 82
End: 2022-09-29
Payer: MEDICARE

## 2022-09-29 VITALS
SYSTOLIC BLOOD PRESSURE: 146 MMHG | OXYGEN SATURATION: 98 % | WEIGHT: 149 LBS | DIASTOLIC BLOOD PRESSURE: 86 MMHG | HEART RATE: 72 BPM | TEMPERATURE: 97.8 F | RESPIRATION RATE: 20 BRPM | HEIGHT: 63 IN | BODY MASS INDEX: 26.4 KG/M2

## 2022-09-29 DIAGNOSIS — Z23 NEED FOR SHINGLES VACCINE: ICD-10-CM

## 2022-09-29 DIAGNOSIS — Z76.89 ENCOUNTER TO ESTABLISH CARE: Primary | ICD-10-CM

## 2022-09-29 PROBLEM — M65.30 ACQUIRED TRIGGER FINGER: Status: ACTIVE | Noted: 2020-11-18

## 2022-09-29 PROBLEM — K59.00 CONSTIPATION: Status: ACTIVE | Noted: 2022-09-29

## 2022-09-29 PROCEDURE — 99213 OFFICE O/P EST LOW 20 MIN: CPT | Performed by: INTERNAL MEDICINE

## 2022-09-29 ASSESSMENT — ENCOUNTER SYMPTOMS
CARDIOVASCULAR NEGATIVE: 1
RESPIRATORY NEGATIVE: 1
CONSTITUTIONAL NEGATIVE: 1
GASTROINTESTINAL NEGATIVE: 1
ARTHRALGIAS: 1

## 2022-09-29 ASSESSMENT — PATIENT HEALTH QUESTIONNAIRE - PHQ9
SUM OF ALL RESPONSES TO PHQ QUESTIONS 1-9: 1
SUM OF ALL RESPONSES TO PHQ QUESTIONS 1-9: 1
10. IF YOU CHECKED OFF ANY PROBLEMS, HOW DIFFICULT HAVE THESE PROBLEMS MADE IT FOR YOU TO DO YOUR WORK, TAKE CARE OF THINGS AT HOME, OR GET ALONG WITH OTHER PEOPLE: NOT DIFFICULT AT ALL

## 2022-09-29 NOTE — PROGRESS NOTES
"  Assessment & Plan     Encounter to establish care  At this time, I did spend a significant amount of time reviewing patient's past medical history with her.  She did have a few questions about my own medical training and personal background.  I did discuss with the patient that I will be happy to assist her with any acute issues that may arise during the times of year that she and her  do reside in [place].  Patient had no other major concerns or issues that she wished to address at this time    Need for shingles vaccine  First dose of the shingles vaccination administered today.      Review of external notes as documented elsewhere in note  30 minutes spent on the date of the encounter doing chart review, history and exam, documentation and further activities per the note       BMI:   Estimated body mass index is 26.39 kg/m  as calculated from the following:    Height as of this encounter: 1.6 m (5' 3\").    Weight as of this encounter: 67.6 kg (149 lb).       See Patient Instructions    Return in about 2 months (around 11/29/2022) for Routine preventive.    Mohamud Goddard MD  [facility]    Cristina Abbott is a 82 year old, presenting for the following health issues:  Establish Care      Patient is an 82-year-old  female who presents to the clinic as a new patient to establish care.  Her main reason for coming in today is that she and her  would like to establish with a local physician for when they are staying in [place].  Patient does spend approximately 7 months out of the year living in [place].  He does return to [place] during the summer for approximately 4 to 5 months.  She has no major concerns or issues that she would like to address today.  Patient does have a complicated past medical history that include Fibromyalgia, spondylosis, herniation of intervertebral disc, as well as depression and anxiety.  She does see a physician while living in " "[place] who manages most of her chronic medical issues.  Her only prescribed medication is Cymbalta that she takes for management of her mood as well as to assist with pain management.  Patient does feel that this medication has been very helpful for her.  Again, she had no major concerns or issues to address today.  Patient did wish to receive the first dose of the shingles vaccination series today.    History of Present Illness       Reason for visit:  To re-establish contact with the clinic after the [facility] of my former physician and get suggestions for continuing care of new and established maladies    She eats 0-1 servings of fruits and vegetables daily.She consumes 0 sweetened beverage(s) daily.She exercises with enough effort to increase her heart rate 60 or more minutes per day.  She exercises with enough effort to increase her heart rate 7 days per week.   She is taking medications regularly.       Hypertension Follow-up      Do you check your blood pressure regularly outside of the clinic? No     Are you following a low salt diet? Yes    Are your blood pressures ever more than 140 on the top number (systolic) OR more   than 90 on the bottom number (diastolic), for example 140/90? No        Review of Systems   Constitutional: Negative.    HENT: Negative.    Respiratory: Negative.    Cardiovascular: Negative.    Gastrointestinal: Negative.    Genitourinary: Negative.    Musculoskeletal: Positive for arthralgias.            Objective    Blood pressure (!) 146/86, pulse 72, temperature 97.8  F (36.6  C), resp. rate 20, height 1.6 m (5' 3\"), weight 67.6 kg (149 lb), SpO2 98 %, not currently breastfeeding.      Physical Exam  Vitals reviewed.   Constitutional:       General: She is not in acute distress.     Appearance: She is well-developed.   HENT:      Right Ear: Tympanic membrane and external ear normal.      Left Ear: Tympanic membrane and external ear normal.      Nose: Nose normal.      Mouth/Throat: "      Pharynx: No oropharyngeal exudate.   Eyes:      General:         Right eye: No discharge.         Left eye: No discharge.      Conjunctiva/sclera: Conjunctivae normal.      Pupils: Pupils are equal, round, and reactive to light.   Neck:      Thyroid: No thyromegaly.      Trachea: No tracheal deviation.   Cardiovascular:      Rate and Rhythm: Normal rate and regular rhythm.      Pulses: Normal pulses.      Heart sounds: Normal heart sounds, S1 normal and S2 normal. No murmur heard.    No friction rub. No S3 or S4 sounds.   Pulmonary:      Effort: Pulmonary effort is normal. No respiratory distress.      Breath sounds: Normal breath sounds. No wheezing or rales.   Abdominal:      General: Bowel sounds are normal.      Palpations: Abdomen is soft. There is no mass.      Tenderness: There is no abdominal tenderness.   Lymphadenopathy:      Cervical: No cervical adenopathy.   Skin:     General: Skin is warm and dry.      Findings: No rash.   Neurological:      Mental Status: She is alert and oriented to person, place, and time.      Motor: No abnormal muscle tone.      Deep Tendon Reflexes: Reflexes are normal and symmetric.

## 2022-11-26 ENCOUNTER — HEALTH MAINTENANCE LETTER (OUTPATIENT)
Age: 82
End: 2022-11-26

## 2023-01-30 ENCOUNTER — TRANSFERRED RECORDS (OUTPATIENT)
Dept: HEALTH INFORMATION MANAGEMENT | Facility: CLINIC | Age: 83
End: 2023-01-30

## 2023-06-10 ENCOUNTER — HEALTH MAINTENANCE LETTER (OUTPATIENT)
Age: 83
End: 2023-06-10

## 2023-06-30 ENCOUNTER — OFFICE VISIT (OUTPATIENT)
Dept: PODIATRY | Facility: CLINIC | Age: 83
End: 2023-06-30
Payer: MEDICARE

## 2023-06-30 VITALS — BODY MASS INDEX: 26.4 KG/M2 | WEIGHT: 149 LBS | HEIGHT: 63 IN

## 2023-06-30 DIAGNOSIS — M79.7 FIBROMYALGIA: Primary | ICD-10-CM

## 2023-06-30 PROCEDURE — 99203 OFFICE O/P NEW LOW 30 MIN: CPT | Performed by: PODIATRIST

## 2023-06-30 NOTE — PROGRESS NOTES
"Foot & Ankle Surgery  June 30, 2023    CC: \"Sore or infected little on left foot\"    I was asked to see Louann Crocker regarding the chief complaint by: Self    HPI:  Pt is a 82 year old female who presents with above complaint.  2 to 3-year history of pain on the left fifth toe.  Describes burning pain, \"if my left and rightfoot burn and both feel like a electric shock\".  Pain 10 out of 10 \"almost every day\".  She has a painful skin lesion on her left fifth toe.    ROS:   Pos for CC.  The patient denies current nausea, vomiting, chills, fevers, belly pain, calf pain, chest pain or SOB.  Complete remainder of ROS is otherwise neg.    VITALS:    Vitals:    06/30/23 1101   Weight: 67.6 kg (149 lb)   Height: 1.6 m (5' 3\")       PMH:    Past Medical History:   Diagnosis Date     Anxiety      DDD (degenerative disc disease), cervical      DDD (degenerative disc disease), lumbar      Fibromyalgia      GERD (gastroesophageal reflux disease)      History of blood transfusion      IBS (irritable bowel syndrome)      Major depressive disorder, single episode, severe, without mention of psychotic behavior 2005    ECT x 16       SXHX:    Past Surgical History:   Procedure Laterality Date     APPENDECTOMY       ARTHROSCOPY KNEE RT/LT  2007     BACK SURGERY  5/2013    Sacroileac Fusion     BACK SURGERY  10/6/2014    Lumbar Fusion L3-L-4, L-4L-5     CATARACT IOL, RT/LT  2008     COLONOSCOPY       ENDOSCOPIC RETROGRADE CHOLANGIOPANCREATOGRAPHY  2005     HC DILATION/CURETTAGE DIAG/THER NON OB  1961    D & C     HC ERCP W SPHINCTEROTOMY  12/09    CBD stones     HC REMOVAL GALLBLADDER  1964    Cholecystectomy     HC REPAIR ROTATOR CUFF,ACUTE  3/90     SURGICAL HISTORY OF -   2005    cervical     SURGICAL HISTORY OF -   2005    lumbar      SURGICAL HISTORY OF -   2008    right knee replacement     VITRECTOMY PARSPLANA WITH 25 GAUGE SYSTEM  5/10/2012    Procedure:VITRECTOMY PARSPLANA WITH 25 GAUGE SYSTEM; LEFT EYE 25 GAUGE " VITRECTOMY, MEMBRANE STRIPPING, AIR  FLUID EXCHANGE, GAS 15% C3F8; Surgeon:ANTONIETTA WALTERS; Location:[facility]     ZZC FUSION MC-P JOINT,SINGLE  2010     ZZC FUSION SHOULDER JOINT  1/01    lap bone sput     RUST ULLOA W/O FACETEC FORAMOT/DSKC 1/2 VRT SEG, LUMBAR  1993     RUST LAP,BILIARY TRACT,UNLISTED  1966     RUST LAPAROSCOPY, SURGICAL; W/ VAGINAL HYSTERECTOMY W/WO REMOVAL OVARY(S)/TUBES  1997    Laparoscopy, Vag Hysterectomy        MEDS:    Current Outpatient Medications   Medication     Ascorbic Acid (VITAMIN C) 500 MG CAPS     Cyanocobalamin (B-12) 1000 MCG TBCR     diclofenac (VOLTAREN) 1 % topical gel     DULoxetine (CYMBALTA) 20 MG capsule     MULTI-VITAMIN OR TABS     NAPROXEN PO     Omeprazole (PRILOSEC PO)     Pyridoxine HCl (VITAMIN B6 PO)     Turmeric 500 MG CAPS     Vitamin D, Cholecalciferol, 25 MCG (1000 UT) TABS     Current Facility-Administered Medications   Medication     dexamethasone (DECADRON) injection 2 mL     lidocaine 1 % injection 4 mL       ALL:     Allergies   Allergen Reactions     Adhesive Tape      Flagyl [Metronidazole Hcl]      Imidazole antifungals, HIVES & RASH     Nickel      rash       FMH:    Family History   Problem Relation Age of Onset     Arthritis Mother         fibromyalgia     Osteoporosis Mother      Cancer Mother         colon     Colon Cancer Mother      Hypertension Father      Cerebrovascular Disease Father      Substance Abuse Father      Substance Abuse Brother      Substance Abuse Son      Colon Cancer Son      Respiratory Brother         emphysema       SocHx:    Social History     Socioeconomic History     Marital status:      Spouse name: Amador     Number of children: 4     Years of education: Not on file     Highest education level: Not on file   Occupational History     Employer: RETIRED     Comment: self employed   Tobacco Use     Smoking status: Former     Packs/day: 1.00     Years: 20.00     Pack years: 20.00     Types: Cigarettes     Quit date:  1977     Years since quittin.5     Smokeless tobacco: Never   Vaping Use     Vaping Use: Never used   Substance and Sexual Activity     Alcohol use: Yes     Comment: on occasion     Drug use: No     Sexual activity: Yes     Partners: Male     Birth control/protection: Surgical   Other Topics Concern     Parent/sibling w/ CABG, MI or angioplasty before 65F 55M? Not Asked   Social History Narrative     Not on file     Social Determinants of Health     Financial Resource Strain: Not on file   Food Insecurity: Not on file   Transportation Needs: Not on file   Physical Activity: Not on file   Stress: Not on file   Social Connections: Not on file   Intimate Partner Violence: Not on file   Housing Stability: Not on file           EXAMINATION:  Gen:   No apparent distress  Neuro:   A&Ox3, no deficits  Psych:    Answering questions appropriately for age and situation with normal affect  Head:    NCAT  Eye:    Visual scanning without deficit  Ear:    Response to auditory stimuli wnl  Lung:    Non-labored breathing on RA noted  Abd:    NTND per patient report  Lymph:    Neg for pitting/non-pitting edema BLE  Vasc:    Pulses palpable, CFT minimally delayed  Neuro:    Light touch sensation intact to all sensory nerve distributions without paresthesias  Derm:    Hyperkeratotic lesion with mild scab left fifth toe.  No wound is seen.  No erosion or fungating mass is seen.  MSK:    ROM, strength wnl without limitation, no pain on palpation noted.  Calf:    Neg for redness, swelling or tenderness      Assessment:  82 year old female with painful callus left fifth toe      Medical Decision Making/Plan:  Discussed etiologies, anatomy and options  1.  Painful callus left fifth toe  -I personally reviewed and interpreted the patient's lower extremity history pertinent to today's visit, including imaging/labs, in preparation for initiating a treatment program.  -We discussed that many hyper-keratotic lesions are caused by  pressure or shearing forces on the skin, and these can often be treated sufficiently with shoes, inserts and local tissue debridement.  Some lesions, however, can have a deep core, and while home treatments are helpful, occasional clinical debridement may be necessary.  In certain cases, surgical excision may be required if no other treatments have proven sufficient.  -Our home instruction handout was dispensed, detailing home therapies to minimize regrowth of the hyperkeratotic tissue.    -we discussed a rough estimate of the cost of debridement in clinic, and how insurance may not cover the cost meaning the patient may be responsible.    -This is not covered by her insurance.  She declined debridement  -Advised comfortable shoes and we discussed padding options  -Our routine foot care handout was dispensed.  Encouraged her to call the clinic to see if this is a covered service in their office    Follow up:  prn or sooner with acute issues      Patient's medical history was reviewed today      Jhony Anthony DPM FACFAS FACFAOM  Podiatric Foot & Ankle Surgeon  [facility]  [phone]    Disclaimer: This note consists of symbols derived from keyboarding, dictation and/or voice recognition software. As a result, there may be errors in the script that have gone undetected. Please consider this when interpreting information found in this chart.

## 2023-06-30 NOTE — PATIENT INSTRUCTIONS
Thank you for choosing [facility] Podiatry / Foot & Ankle Surgery!    DR. AMAYA'S CLINIC LOCATIONS:     [facility] (Friday) TRIAGE LINE: [phone]   [address]  APPOINTMENTS: [phone]   [place] [address] RADIOLOGY: [phone]    PHYSICAL THERAPY: [phone]    SET UP SURGERY: [phone]   [facility] (Mon-Tues AM-Thurs) BILLING QUESTIONS: [phone] [id number] FAX: [phone]   [place] [address]        Tips for treating painful calluses:    1.  Pumice stone/dashawn board therapy multiple times weekly to remove callus tissue as it builds up    2.  Good supportive shoes and minimizing barefoot ambulation can slow down callus formation, and can decrease pain levels    3.  Gel inserts can also provide padding to the bottom of the foot, to prevent pain and slow recurrence.    4.  Applying lotion daily/twice daily to the callus tissue can keep it softer and less painful.  Lotions with lactic acid or urea work well, but most lotions are sufficient      Here is a list of routine foot care resources, which includes toenail trimming and callus/corn management.     This is not a referral. It is your responsibility to contact the organization and your insurance to confirm cost and coverage.      ROUTINE FOOT CARE (NAIL TRIMMING / CALLUSES)      [facility]  [phone]   [facility]  [phone]  Multiple locations   [facility]  [phone] Dr. Salgado and Dr. Ardon  [address]  [place] [address] [phone]

## 2023-07-22 ENCOUNTER — OFFICE VISIT (OUTPATIENT)
Dept: URGENT CARE | Facility: URGENT CARE | Age: 83
End: 2023-07-22
Payer: MEDICARE

## 2023-07-22 VITALS
WEIGHT: 147.7 LBS | HEART RATE: 80 BPM | DIASTOLIC BLOOD PRESSURE: 83 MMHG | TEMPERATURE: 97.5 F | OXYGEN SATURATION: 99 % | SYSTOLIC BLOOD PRESSURE: 163 MMHG | RESPIRATION RATE: 16 BRPM | BODY MASS INDEX: 26.16 KG/M2

## 2023-07-22 DIAGNOSIS — S91.105A OPEN WOUND OF FIFTH TOE OF LEFT FOOT, INITIAL ENCOUNTER: Primary | ICD-10-CM

## 2023-07-22 PROCEDURE — 99213 OFFICE O/P EST LOW 20 MIN: CPT | Performed by: PHYSICIAN ASSISTANT

## 2023-07-22 RX ORDER — MUPIROCIN 20 MG/G
OINTMENT TOPICAL 3 TIMES DAILY
Qty: 30 G | Refills: 0 | Status: SHIPPED | OUTPATIENT
Start: 2023-07-22 | End: 2023-08-01

## 2023-07-22 RX ORDER — CEPHALEXIN 500 MG/1
500 CAPSULE ORAL 3 TIMES DAILY
Qty: 30 CAPSULE | Refills: 0 | Status: SHIPPED | OUTPATIENT
Start: 2023-07-22 | End: 2023-08-01

## 2023-07-22 NOTE — PROGRESS NOTES
Assessment & Plan     Open wound of fifth toe of left foot, initial encounter  Acute problem.  Concern is for infection today.  Keflex is prescribed, Bactroban ointment also prescribed.  Wound care instructions are given.  Advised to keep clean, change dressing once a day.  Dressing with bacitracin, Telfa and Coban today.  Keep monitoring symptoms.  Follow-up if any worsening symptoms.  Patient agrees with the plan.  - cephALEXin (KEFLEX) 500 MG capsule  Dispense: 30 capsule; Refill: 0  - mupirocin (BACTROBAN) 2 % external ointment  Dispense: 30 g; Refill: 0       Return in about 1 week (around 7/29/2023) for Symptoms failing to improve.    Poly Levine PA-C  [facility] [place]    Cristina Abbott is a 82 year old female who presents to clinic today for the following health issues:  Chief Complaint   Patient presents with     Musculoskeletal Problem     81 yo F presents with the following complaint left foot swelling as well as an open wound on left little toe.  First onset months.       HPI  Patient is presenting to urgent care today with a complaint of a wound left fifth toe.  Ongoing for the past few days.  She reports she has had a callus on that left fifth toe for a couple months.  Last seen podiatry 3 weeks ago.  Her insurance would not cover debridement, so she declined having the debridement done at the podiatry clinic.  She has been soaking in Epsom salt and warm water.  She reports drainage for the past few days and now an open wound in the past couple days at the affected toe.  She reports swelling and pain and erythema.  No fevers or chills.      Review of Systems  Constitutional, HEENT, cardiovascular, pulmonary, GI, , musculoskeletal, neuro, skin, endocrine and psych systems are negative, except as otherwise noted.      Objective    BP (!) 163/83   Pulse 80   Temp 97.5  F (36.4  C)   Resp 16   Wt 67 kg (147 lb 11.2 oz)   SpO2 99%   BMI 26.16 kg/m    Physical Exam    GENERAL: healthy, alert and no distress  SKIN: There is an open wound left fifth toe 2 cm x 1 cm.  No purulent drainage at this time.  There is mild erythema extending on the dorsum of the left foot.  There is tenderness to palpation.  Please see pictures below.

## 2023-07-28 ENCOUNTER — OFFICE VISIT (OUTPATIENT)
Dept: INTERNAL MEDICINE | Facility: CLINIC | Age: 83
End: 2023-07-28
Payer: MEDICARE

## 2023-07-28 ENCOUNTER — ANCILLARY PROCEDURE (OUTPATIENT)
Dept: GENERAL RADIOLOGY | Facility: CLINIC | Age: 83
End: 2023-07-28
Payer: MEDICARE

## 2023-07-28 VITALS
BODY MASS INDEX: 25.87 KG/M2 | HEIGHT: 63 IN | WEIGHT: 146 LBS | DIASTOLIC BLOOD PRESSURE: 74 MMHG | RESPIRATION RATE: 18 BRPM | OXYGEN SATURATION: 98 % | SYSTOLIC BLOOD PRESSURE: 120 MMHG | HEART RATE: 84 BPM | TEMPERATURE: 98 F

## 2023-07-28 DIAGNOSIS — E83.52 HYPERCALCEMIA: ICD-10-CM

## 2023-07-28 DIAGNOSIS — L03.90 CELLULITIS, UNSPECIFIED CELLULITIS SITE: ICD-10-CM

## 2023-07-28 DIAGNOSIS — L97.521 SKIN ULCER OF TOE OF LEFT FOOT, LIMITED TO BREAKDOWN OF SKIN (H): ICD-10-CM

## 2023-07-28 DIAGNOSIS — Z86.39 HISTORY OF IRON DEFICIENCY: ICD-10-CM

## 2023-07-28 DIAGNOSIS — E55.9 VITAMIN D DEFICIENCY: ICD-10-CM

## 2023-07-28 DIAGNOSIS — R53.83 OTHER FATIGUE: Primary | ICD-10-CM

## 2023-07-28 PROBLEM — I45.6 WOLFF-PARKINSON-WHITE PATTERN: Status: ACTIVE | Noted: 2023-07-28

## 2023-07-28 PROBLEM — M19.031 OSTEOARTHRITIS OF BOTH WRISTS: Status: ACTIVE | Noted: 2023-07-28

## 2023-07-28 PROBLEM — M19.032 OSTEOARTHRITIS OF BOTH WRISTS: Status: ACTIVE | Noted: 2023-07-28

## 2023-07-28 LAB
ALBUMIN UR-MCNC: NEGATIVE MG/DL
APPEARANCE UR: CLEAR
BACTERIA #/AREA URNS HPF: NORMAL /HPF
BILIRUB UR QL STRIP: NEGATIVE
COLOR UR AUTO: YELLOW
ERYTHROCYTE [DISTWIDTH] IN BLOOD BY AUTOMATED COUNT: 13 % (ref 10–15)
GLUCOSE UR STRIP-MCNC: NEGATIVE MG/DL
HCT VFR BLD AUTO: 43.1 % (ref 35–47)
HGB BLD-MCNC: 14.1 G/DL (ref 11.7–15.7)
HGB UR QL STRIP: NEGATIVE
KETONES UR STRIP-MCNC: ABNORMAL MG/DL
LEUKOCYTE ESTERASE UR QL STRIP: NEGATIVE
MCH RBC QN AUTO: 28.7 PG (ref 26.5–33)
MCHC RBC AUTO-ENTMCNC: 32.7 G/DL (ref 31.5–36.5)
MCV RBC AUTO: 88 FL (ref 78–100)
NITRATE UR QL: NEGATIVE
PH UR STRIP: 7 [PH] (ref 5–7)
PLATELET # BLD AUTO: 214 10E3/UL (ref 150–450)
RBC # BLD AUTO: 4.92 10E6/UL (ref 3.8–5.2)
RBC #/AREA URNS AUTO: NORMAL /HPF
SP GR UR STRIP: 1.02 (ref 1–1.03)
UROBILINOGEN UR STRIP-ACNC: 1 E.U./DL
WBC # BLD AUTO: 9.3 10E3/UL (ref 4–11)
WBC #/AREA URNS AUTO: NORMAL /HPF

## 2023-07-28 PROCEDURE — 81001 URINALYSIS AUTO W/SCOPE: CPT

## 2023-07-28 PROCEDURE — 82728 ASSAY OF FERRITIN: CPT

## 2023-07-28 PROCEDURE — 83540 ASSAY OF IRON: CPT

## 2023-07-28 PROCEDURE — 84443 ASSAY THYROID STIM HORMONE: CPT

## 2023-07-28 PROCEDURE — 80053 COMPREHEN METABOLIC PANEL: CPT

## 2023-07-28 PROCEDURE — 84439 ASSAY OF FREE THYROXINE: CPT

## 2023-07-28 PROCEDURE — 99214 OFFICE O/P EST MOD 30 MIN: CPT

## 2023-07-28 PROCEDURE — 73630 X-RAY EXAM OF FOOT: CPT | Mod: TC | Performed by: RADIOLOGY

## 2023-07-28 PROCEDURE — 36415 COLL VENOUS BLD VENIPUNCTURE: CPT

## 2023-07-28 PROCEDURE — 83550 IRON BINDING TEST: CPT

## 2023-07-28 PROCEDURE — 85027 COMPLETE CBC AUTOMATED: CPT

## 2023-07-28 ASSESSMENT — PATIENT HEALTH QUESTIONNAIRE - PHQ9
10. IF YOU CHECKED OFF ANY PROBLEMS, HOW DIFFICULT HAVE THESE PROBLEMS MADE IT FOR YOU TO DO YOUR WORK, TAKE CARE OF THINGS AT HOME, OR GET ALONG WITH OTHER PEOPLE: NOT DIFFICULT AT ALL
SUM OF ALL RESPONSES TO PHQ QUESTIONS 1-9: 2
SUM OF ALL RESPONSES TO PHQ QUESTIONS 1-9: 2

## 2023-07-28 ASSESSMENT — PAIN SCALES - GENERAL: PAINLEVEL: NO PAIN (0)

## 2023-07-28 NOTE — PATIENT INSTRUCTIONS
XR is downstairs and lab is in [address]. I will comment on your results when they are all back.      Follow up with podiatrist regarding wound care. My nurse will call you on Monday to check in

## 2023-07-28 NOTE — PROGRESS NOTES
"  Assessment & Plan       Other fatigue  Patient has noticed increasing fatigue several months.  She will sleep more than 8 hours/day and nap during the day as well.  We will check labs.  She states that her previously been well so we will check that as well. She typically has primary care done in [place]- we do not have records   - CBC with platelets; Future  - TSH with free T4 reflex; Future  - Iron and iron binding capacity; Future  - Ferritin; Future  - UA with Microscopic reflex to Culture - lab collect; Future  - Comprehensive metabolic panel (BMP + Alb, Alk Phos, ALT, AST, Total. Bili, TP); Future  - CBC with platelets  - TSH with free T4 reflex  - Iron and iron binding capacity  - Ferritin  - UA with Microscopic reflex to Culture - lab collect  - Comprehensive metabolic panel (BMP + Alb, Alk Phos, ALT, AST, Total. Bili, TP)    History of iron deficiency  Update labs  - Iron and iron binding capacity; Future  - Ferritin; Future  - Iron and iron binding capacity  - Ferritin    Cellulitis, unspecified cellulitis site  We will have RN call on Monday to see if she has any more swelling or redness. If so can extend keflex   - Comprehensive metabolic panel (BMP + Alb, Alk Phos, ALT, AST, Total. Bili, TP); Future  - Comprehensive metabolic panel (BMP + Alb, Alk Phos, ALT, AST, Total. Bili, TP)    Skin ulcer of toe of left foot, limited to breakdown of skin (H)  Ensure no osteomyelitis or foreign body  - XR Foot Left G/E 3 Views; Future       BMI:   Estimated body mass index is 25.86 kg/m  as calculated from the following:    Height as of this encounter: 1.6 m (5' 3\").    Weight as of this encounter: 66.2 kg (146 lb).     Jhony Tim NP  [facility]    Cristina Abbott is a 82 year old, presenting for the following health issues:  Hypertension and Infection (Possible infection of big toe on left foot.)      7/28/2023    12:33 PM   Additional Questions   Roomed by Mayra Meyer       History " "of Present Illness       Reason for visit:  My toe left foot is infected left knee is hurting etc    She eats 0-1 servings of fruits and vegetables daily.She consumes 1 sweetened beverage(s) daily.She exercises with enough effort to increase her heart rate 60 or more minutes per day.  She exercises with enough effort to increase her heart rate 5 days per week.   She is taking medications regularly.     Hypertension Follow-up    Do you check your blood pressure regularly outside of the clinic? No   Are you following a low salt diet? Yes  Are your blood pressures ever more than 140 on the top number (systolic) OR more   than 90 on the bottom number (diastolic), for example 140/90? No    Had antibiotic x3 a day for 10 days. And used Bactroban on area. It formed a big bubble, and had been draining since then    Denies fever and chills that were abnormal for patient     Patient is also experiencing fatigue- has been occurring ongoing for a couple of months    Ate breakfast this morning         Review of Systems   Constitutional, HEENT, cardiovascular, pulmonary, gi and gu systems are negative, except as otherwise noted.      Objective    Resp 18   Ht 1.6 m (5' 3\")   LMP  (LMP Unknown)   Breastfeeding No   BMI 26.16 kg/m    Body mass index is 26.16 kg/m .  Physical Exam   GENERAL: alert and no distress  EYES: Eyes grossly normal to inspection  RESP: lungs clear to auscultation - no rales, rhonchi or wheezes  CV: regular rate and rhythm, normal S1 S2, no S3 or S4, no murmur, click or rub, no peripheral edema and peripheral pulses strong  MS: no gross musculoskeletal defects noted, no edema  SKIN: no suspicious lesions or rashes  NEURO: Normal strength and tone, mentation intact and speech normal  PSYCH: mentation appears normal, affect normal/bright    "

## 2023-07-29 LAB
ALBUMIN SERPL BCG-MCNC: 4.4 G/DL (ref 3.5–5.2)
ALP SERPL-CCNC: 77 U/L (ref 35–104)
ALT SERPL W P-5'-P-CCNC: 31 U/L (ref 0–50)
ANION GAP SERPL CALCULATED.3IONS-SCNC: 12 MMOL/L (ref 7–15)
AST SERPL W P-5'-P-CCNC: 42 U/L (ref 0–45)
BILIRUB SERPL-MCNC: 0.5 MG/DL
BUN SERPL-MCNC: 17.5 MG/DL (ref 8–23)
CALCIUM SERPL-MCNC: 10.6 MG/DL (ref 8.8–10.2)
CHLORIDE SERPL-SCNC: 103 MMOL/L (ref 98–107)
CREAT SERPL-MCNC: 0.65 MG/DL (ref 0.51–0.95)
DEPRECATED HCO3 PLAS-SCNC: 22 MMOL/L (ref 22–29)
FERRITIN SERPL-MCNC: 66 NG/ML (ref 11–328)
GFR SERPL CREATININE-BSD FRML MDRD: 87 ML/MIN/1.73M2
GLUCOSE SERPL-MCNC: 90 MG/DL (ref 70–99)
IRON BINDING CAPACITY (ROCHE): 260 UG/DL (ref 240–430)
IRON SATN MFR SERPL: 35 % (ref 15–46)
IRON SERPL-MCNC: 90 UG/DL (ref 37–145)
POTASSIUM SERPL-SCNC: 4.2 MMOL/L (ref 3.4–5.3)
PROT SERPL-MCNC: 6.8 G/DL (ref 6.4–8.3)
SODIUM SERPL-SCNC: 137 MMOL/L (ref 136–145)
T4 FREE SERPL-MCNC: 1.22 NG/DL (ref 0.9–1.7)
TSH SERPL DL<=0.005 MIU/L-ACNC: 0.29 UIU/ML (ref 0.3–4.2)

## 2023-07-31 ENCOUNTER — TELEPHONE (OUTPATIENT)
Dept: INTERNAL MEDICINE | Facility: CLINIC | Age: 83
End: 2023-07-31
Payer: MEDICARE

## 2023-07-31 DIAGNOSIS — R79.89 ABNORMAL TSH: Primary | ICD-10-CM

## 2023-07-31 DIAGNOSIS — R94.6 THYROID FUNCTION TEST ABNORMAL: ICD-10-CM

## 2023-07-31 DIAGNOSIS — L03.032 CELLULITIS OF TOE OF LEFT FOOT: Primary | ICD-10-CM

## 2023-07-31 RX ORDER — CEPHALEXIN 500 MG/1
500 CAPSULE ORAL 3 TIMES DAILY
Qty: 15 CAPSULE | Refills: 0 | Status: SHIPPED | OUTPATIENT
Start: 2023-07-31

## 2023-07-31 NOTE — TELEPHONE ENCOUNTER
Per Jhony Tim's request attempted to follow up with patient  to see how her symptoms following her 07/28/2023 visit to check on status of warmth and swelling regarding diagnosis of cellulitis. If patient is still having warmth and swelling, Jhony stated he could extend her Keflex antibiotic.     Spoke with  who states patient was unavailable and to try calling later.    Thank you,  Les Lomeli, Triage RN [facility]  8:13 AM 7/31/2023

## 2023-11-15 ENCOUNTER — TRANSFERRED RECORDS (OUTPATIENT)
Dept: HEALTH INFORMATION MANAGEMENT | Facility: CLINIC | Age: 83
End: 2023-11-15

## 2024-01-22 ENCOUNTER — TRANSFERRED RECORDS (OUTPATIENT)
Dept: HEALTH INFORMATION MANAGEMENT | Facility: CLINIC | Age: 84
End: 2024-01-22

## 2024-01-26 ENCOUNTER — TRANSFERRED RECORDS (OUTPATIENT)
Dept: HEALTH INFORMATION MANAGEMENT | Facility: CLINIC | Age: 84
End: 2024-01-26

## 2024-01-29 ENCOUNTER — TRANSFERRED RECORDS (OUTPATIENT)
Dept: HEALTH INFORMATION MANAGEMENT | Facility: CLINIC | Age: 84
End: 2024-01-29

## 2024-03-07 ENCOUNTER — TRANSFERRED RECORDS (OUTPATIENT)
Dept: HEALTH INFORMATION MANAGEMENT | Facility: CLINIC | Age: 84
End: 2024-03-07

## 2024-03-28 ENCOUNTER — TRANSFERRED RECORDS (OUTPATIENT)
Dept: MULTI SPECIALTY CLINIC | Facility: CLINIC | Age: 84
End: 2024-03-28

## 2024-04-19 ENCOUNTER — TRANSFERRED RECORDS (OUTPATIENT)
Dept: HEALTH INFORMATION MANAGEMENT | Facility: CLINIC | Age: 84
End: 2024-04-19

## 2024-06-08 ENCOUNTER — OFFICE VISIT (OUTPATIENT)
Dept: URGENT CARE | Facility: URGENT CARE | Age: 84
End: 2024-06-08
Payer: MEDICARE

## 2024-06-08 VITALS
DIASTOLIC BLOOD PRESSURE: 79 MMHG | HEART RATE: 77 BPM | WEIGHT: 131.6 LBS | TEMPERATURE: 97.6 F | SYSTOLIC BLOOD PRESSURE: 178 MMHG | OXYGEN SATURATION: 99 % | BODY MASS INDEX: 23.31 KG/M2

## 2024-06-08 DIAGNOSIS — S06.0X0A CONCUSSION WITHOUT LOSS OF CONSCIOUSNESS, INITIAL ENCOUNTER: Primary | ICD-10-CM

## 2024-06-08 PROCEDURE — 99213 OFFICE O/P EST LOW 20 MIN: CPT | Performed by: FAMILY MEDICINE

## 2024-06-08 RX ORDER — MESALAMINE 1.2 G/1
1 TABLET, DELAYED RELEASE ORAL
COMMUNITY
Start: 2024-04-01

## 2024-06-08 RX ORDER — BUDESONIDE 3 MG/1
6 CAPSULE, COATED PELLETS ORAL DAILY
COMMUNITY
Start: 2024-04-01 | End: 2024-10-07

## 2024-06-08 NOTE — PROGRESS NOTES
"  Assessment & Plan     Concussion without loss of consciousness, initial encounter  Fall 2 days ago without loss of consciousness, not on anticoagulation.  Do not suspect intracranial hemorrhage.  Neurological exam symptoms most consistent with concussion loss of consciousness.  Discussed gradual return to physical activity, return if having difficulty swallowing, double vision, speech difficulties, or new onset dizziness.            BMI  Estimated body mass index is 23.31 kg/m  as calculated from the following:    Height as of 7/28/23: 1.6 m (5' 3\").    Weight as of this encounter: 59.7 kg (131 lb 9.6 oz).             Return in about 1 week (around 6/15/2024) for If symptoms do not improve or gets worse..    Subjective   Pat is a 83 year old, presenting for the following health issues:  Urgent Care (Pt accidentally fell into rocks in front of her home 2d ago; she hit her head on the rocks, injured both knee caps and lt arm. Ever since, pt has been feeling extremely fatigued with head pain. She also experiences slight dizziness with sudden movements.)    HPI   Patient is a pleasant 83-year-old female accompanied by her  presents to urgent care after feeling some fatigue and headache after an accidental fall 2 days ago when she slipped and fell by her rocks.  Hit on the right top of her head, no loss of consciousness.  Scraped up her kneecaps and arm.  No associated numbness weakness or tingling.    No double vision difficulty swallowing speech irregularities.   has not noticed any significant mental changes, he is a retired pharmacist, states her pupils have been reactive and equal.                Objective    BP (!) 178/79   Pulse 77   Temp 97.6  F (36.4  C) (Tympanic)   Wt 59.7 kg (131 lb 9.6 oz)   LMP  (LMP Unknown)   SpO2 99%   BMI 23.31 kg/m    Body mass index is 23.31 kg/m .  Physical Exam  Vitals reviewed.   Constitutional:       Appearance: She is not ill-appearing.   HENT:      Right " Ear: Tympanic membrane normal.      Left Ear: Tympanic membrane normal.      Nose: No congestion or rhinorrhea.   Eyes:      General:         Right eye: No discharge.         Left eye: No discharge.      Extraocular Movements: Extraocular movements intact.      Pupils: Pupils are equal, round, and reactive to light.   Cardiovascular:      Rate and Rhythm: Normal rate and regular rhythm.   Pulmonary:      Effort: Pulmonary effort is normal.   Musculoskeletal:      Comments: Contusion over her elbows and knees, full range of motion.   Neurological:      Mental Status: She is alert.      Comments: Cranial nerves II through XII intact, strength 5+, symmetric sensation intact, normal deep tendon reflexes.  No dysmetria, normal gait                    Signed Electronically by: Alberto Peñaloza MD

## 2024-08-03 ENCOUNTER — HEALTH MAINTENANCE LETTER (OUTPATIENT)
Age: 84
End: 2024-08-03

## 2024-08-12 ENCOUNTER — TRANSFERRED RECORDS (OUTPATIENT)
Dept: HEALTH INFORMATION MANAGEMENT | Facility: CLINIC | Age: 84
End: 2024-08-12
Payer: MEDICARE

## 2024-10-02 ENCOUNTER — TELEPHONE (OUTPATIENT)
Dept: SCHEDULING | Facility: CLINIC | Age: 84
End: 2024-10-02
Payer: MEDICARE

## 2024-10-02 NOTE — TELEPHONE ENCOUNTER
reports that patient was seen by endocrinologist in [place], and was scheduled for next Prolia injection 10/15/24. Patient will not be back in [place] until December. Was going to have orders faxed to [facility] center but was unable to as orders need to be placed by a [place] provider.      is requesting if patient can receive prolia injection here in the clinic. Advised  that patient has not seen anyone in clinic for over a year and would likely need appointment to discuss first. Clinic would also need endocrinology notes as well.  will have endo notes faxed to clinic.     PCP please advise if willing to order prolia for patient to receive injection in clinic? Could this be completed with a VV to be seen sooner?    Summer RN 2:16 PM October 2, 2024   [facility]

## 2024-10-02 NOTE — TELEPHONE ENCOUNTER
Reason for Call:  Appointment Request    Patient requesting this type of appt: Chronic Diease Management/Medication/Follow-Up    Requested provider: RN Visit    Reason patient unable to be scheduled: Needs to be scheduled by clinic    When does patient want to be seen/preferred time: 1-2 weeks    Comments: Pt is needing appointment for Prolia injection.  states she was originally going to be receiving this from a provider she see's in [place] for this, but is unable to make it there until December, needing in the next 2 weeks.    Could we send this information to you in [employer] or would you prefer to receive a phone call?:   Patient would prefer a phone call Okay to leave a detailed message?: Yes at Home phone: [phone]       Call taken on 10/2/2024 at 11:11 AM by Zhanna Freeman

## 2024-10-02 NOTE — TELEPHONE ENCOUNTER
Called and spoke with  Amador (consent to communicate on file) to relay DR. Goddard's response. Virtual video visit appointment set up for next week.    Will wait for fax from [place] providers.  contacted [facility] this afternoon to have records faxed to clinic.     Oct 07, 2024 9:00 AM  (Arrive by 8:55 AM)  Provider Visit with Mohamud Goddard MD  [facility] ([facility] ) [phone]     Thank you,  Les Lomeli, Triage RN [facility]  3:26 PM 10/2/2024

## 2024-10-03 NOTE — TELEPHONE ENCOUNTER
Pts  calling to follow up on paperwork from endocrinology in [place] - Dr Denisha Yarbrough.  [phone]    They are telling pt records were faxed and did go through. Has virtual appt with PCP for this issue on Monday and wants to make sure the records are at clinic    Please call to let them know records are available.     Denisha Parker RN

## 2024-10-04 NOTE — TELEPHONE ENCOUNTER
remembers seeing records from [place], but unsure if records were from endo provider or for this patient.  sent records to be scanned into Epic.     Thank you,  Les Lomeli, Triage RN [facility]  8:03 AM 10/4/2024      No. Let's wait for blood draw for myasthenia panel. Please note allergy

## 2024-10-07 ENCOUNTER — VIRTUAL VISIT (OUTPATIENT)
Dept: INTERNAL MEDICINE | Facility: CLINIC | Age: 84
End: 2024-10-07
Payer: MEDICARE

## 2024-10-07 ENCOUNTER — LAB (OUTPATIENT)
Dept: LAB | Facility: CLINIC | Age: 84
End: 2024-10-07
Payer: MEDICARE

## 2024-10-07 DIAGNOSIS — M81.0 OSTEOPOROSIS WITHOUT CURRENT PATHOLOGICAL FRACTURE, UNSPECIFIED OSTEOPOROSIS TYPE: ICD-10-CM

## 2024-10-07 DIAGNOSIS — M81.0 OSTEOPOROSIS WITHOUT CURRENT PATHOLOGICAL FRACTURE, UNSPECIFIED OSTEOPOROSIS TYPE: Primary | ICD-10-CM

## 2024-10-07 DIAGNOSIS — E78.1 HYPERTRIGLYCERIDEMIA: Primary | ICD-10-CM

## 2024-10-07 LAB
ANION GAP SERPL CALCULATED.3IONS-SCNC: 9 MMOL/L (ref 7–15)
BUN SERPL-MCNC: 12.7 MG/DL (ref 8–23)
CALCIUM SERPL-MCNC: 10.1 MG/DL (ref 8.8–10.4)
CHLORIDE SERPL-SCNC: 106 MMOL/L (ref 98–107)
CHOLEST SERPL-MCNC: 188 MG/DL
CREAT SERPL-MCNC: 0.75 MG/DL (ref 0.51–0.95)
EGFRCR SERPLBLD CKD-EPI 2021: 78 ML/MIN/1.73M2
FASTING STATUS PATIENT QL REPORTED: ABNORMAL
FASTING STATUS PATIENT QL REPORTED: NORMAL
GLUCOSE SERPL-MCNC: 93 MG/DL (ref 70–99)
HCO3 SERPL-SCNC: 28 MMOL/L (ref 22–29)
HDLC SERPL-MCNC: 58 MG/DL
LDLC SERPL CALC-MCNC: 107 MG/DL
NONHDLC SERPL-MCNC: 130 MG/DL
POTASSIUM SERPL-SCNC: 4.3 MMOL/L (ref 3.4–5.3)
SODIUM SERPL-SCNC: 143 MMOL/L (ref 135–145)
TRIGL SERPL-MCNC: 115 MG/DL

## 2024-10-07 PROCEDURE — 99442 PR PHYSICIAN TELEPHONE EVALUATION 11-20 MIN: CPT | Mod: 95 | Performed by: INTERNAL MEDICINE

## 2024-10-07 PROCEDURE — 36415 COLL VENOUS BLD VENIPUNCTURE: CPT

## 2024-10-07 PROCEDURE — 80048 BASIC METABOLIC PNL TOTAL CA: CPT

## 2024-10-07 PROCEDURE — 80061 LIPID PANEL: CPT

## 2024-10-07 NOTE — PROGRESS NOTES
Pat is a 84 year old who is being evaluated via a billable video visit.    How would you like to obtain your AVS? MyChart  If the video visit is dropped, the invitation should be resent by: Text to cell phone: [phone]  Will anyone else be joining your video visit? No      Assessment & Plan     Osteoporosis without current pathological fracture, unspecified osteoporosis type  At this time, patient will arrange a lab visit to have a basic metabolic panel collected to ensure there are no electrolyte abnormalities present after her initial Prolia injection 6 months ago.  Assuming no unexpected abnormalities, we will arrange for a follow-up Prolia injection for her at this time.  Side effects of Prolia use were reviewed.  Patient had no further questions or concerns in this regard.  - Basic metabolic panel  (Ca, Cl, CO2, Creat, Gluc, K, Na, BUN); Future        See Patient Instructions    Subjective   Milena is a 84 year old, presenting for the following health issues:  Imm/Inj (Wants to discuss about prolia injection )        10/7/2024     8:25 AM   Additional Questions   Roomed by azamit   Accompanied by          10/7/2024     8:25 AM   Patient Reported Additional Medications   Patient reports taking the following new medications none     Via the Health Maintenance questionnaire, the patient has reported the following services have been completed DEXA: Dr. Denisha Yarbrough[facility], [place], [place] 2024-03-28, this information has been sent to the abstraction team.  Video Start Time:  8:50 AM    Patient is an 84-year-old  female who participates in virtual visit to discuss further Prolia injections.  Patient does split her time between [place] and [place].  She did have a DEXA scan in November 2023 that did confirm the presence of osteoporosis in [place].  She did receive her first Prolia injection approximately 6 months prior to this visit, and she will be due for a follow-up  "injection on October 15, 2024.  Patient had not been seen in this clinic since 2022, and she did schedule a visit to see if she could arrange for her next injection to be performed.  Patient has not had any lab work since her initial injection was administered.  She did tolerate the medication without issue.  Patient has no other questions or concerns that she wished to address at this time.    History of Present Illness       Reason for visit:  Need approval for Ploria shot for osteoporosis. Second in the series.    She eats 2-3 servings of fruits and vegetables daily.She consumes 0 sweetened beverage(s) daily.She exercises with enough effort to increase her heart rate 10 to 19 minutes per day.  She exercises with enough effort to increase her heart rate 7 days per week.   She is taking medications regularly.         Review of Systems  CONSTITUTIONAL: NEGATIVE for fever, chills, change in weight  INTEGUMENTARY/SKIN: NEGATIVE for worrisome rashes, moles or lesions  ENT/MOUTH: NEGATIVE for ear, mouth and throat problems  RESP: NEGATIVE for significant cough or SOB  CV: NEGATIVE for chest pain, palpitations or peripheral edema  GI: NEGATIVE for nausea, abdominal pain, heartburn, or change in bowel habits  : NEGATIVE for frequency, dysuria, or hematuria  MUSCULOSKELETAL: NEGATIVE for significant arthralgias or myalgia      Objective    Vitals - Patient Reported  Weight (Patient Reported): 64 kg (141 lb)  Height (Patient Reported): 162.6 cm (5' 4\")  BMI (Based on Pt Reported Ht/Wt): 24.2  Pain Score: No Pain (0)        Physical Exam   GENERAL: alert and no distress  EYES: Eyes grossly normal to inspection.  No discharge or erythema, or obvious scleral/conjunctival abnormalities.  RESP: No audible wheeze, cough, or visible cyanosis.    SKIN: Visible skin clear. No significant rash, abnormal pigmentation or lesions.  NEURO: Cranial nerves grossly intact.  Mentation and speech appropriate for age.  PSYCH: Appropriate " affect, tone, and pace of words    Diagnostic testing: BMP is pending.      Video-Visit Details    Type of service:  Video Visit   Video End Time:9:07 AM  Originating Location (pt. Location): Home  Distant Location (provider location):  On-site  Platform used for Video Visit: Jesus  Signed Electronically by: Mohamud Goddard MD

## 2024-10-10 ENCOUNTER — MYC MEDICAL ADVICE (OUTPATIENT)
Dept: INTERNAL MEDICINE | Facility: CLINIC | Age: 84
End: 2024-10-10
Payer: MEDICARE

## 2024-10-10 DIAGNOSIS — M81.0 OSTEOPOROSIS WITHOUT CURRENT PATHOLOGICAL FRACTURE, UNSPECIFIED OSTEOPOROSIS TYPE: Primary | ICD-10-CM

## 2024-10-10 NOTE — TELEPHONE ENCOUNTER
No concerning findings noted on her recent labs. She should be able to proceed with her next Prolia injection.

## 2024-10-11 ENCOUNTER — TELEPHONE (OUTPATIENT)
Dept: INTERNAL MEDICINE | Facility: CLINIC | Age: 84
End: 2024-10-11
Payer: MEDICARE

## 2024-10-11 DIAGNOSIS — M81.0 OSTEOPOROSIS WITHOUT CURRENT PATHOLOGICAL FRACTURE, UNSPECIFIED OSTEOPOROSIS TYPE: ICD-10-CM

## 2024-10-11 DIAGNOSIS — Z92.29 PERSONAL HISTORY OF OTHER DRUG THERAPY: Primary | ICD-10-CM

## 2024-10-11 NOTE — TELEPHONE ENCOUNTER
Patient's  calls, is wanting to know when she can be scheduled for her Prolia injection. Informed her  that the clinic will be reaching out to them soon for scheduling. Alternative phone number for cell is [phone].

## 2024-10-11 NOTE — TELEPHONE ENCOUNTER
Call to  and discussed. He schedules for pt. She had this Prolia shot in [place] 6 months ago. Advised  to have pt confirm the date before she comes in so we can document this. He agrees.     Scheduled for 10/29. Will route to CAM PA dept.

## 2024-10-15 NOTE — TELEPHONE ENCOUNTER
Will route to Dr Goddard:     Boris,  I am working on the therapy plan that was entered for Prolia for this patient.  With their insurance coverage, it follows Medicare's NGS policy.  Currently we have the DX M81.0 but this request will need an additional diagnosis to support why this patient is not appropriate for other therapies.    M81.8 (ICD-10-CM) - Other osteoporosis without current pathological fracture (**covered as a single code)    Z92.29 - Personal history of other drug therapy  T50.995S - Adverse effect of other drugs, medicaments and biological substances, sequela  T88.7XXS - Unspecified adverse effect of drug or medicament, sequela  Z88.8 - Allergy status to other drugs, medicaments and biological substances status    N18.3 - Chronic kidney disease, stage 3 (moderate)  N18.4 - Chronic kidney disease, stage 4 (severe)  N18.5 - Chronic kidney disease, stage 5    Would a second diagnosis code apply to this patient?    If so, can you please have it added to the order or enter a new order with both codes?  Thank you,  Anju

## 2024-10-16 ENCOUNTER — HOSPITAL ENCOUNTER (EMERGENCY)
Facility: CLINIC | Age: 84
Discharge: HOME | End: 2024-10-16
Attending: EMERGENCY MEDICINE | Admitting: EMERGENCY MEDICINE
Payer: MEDICARE

## 2024-10-16 ENCOUNTER — APPOINTMENT (OUTPATIENT)
Dept: CT IMAGING | Facility: CLINIC | Age: 84
End: 2024-10-16
Attending: EMERGENCY MEDICINE
Payer: MEDICARE

## 2024-10-16 VITALS
OXYGEN SATURATION: 97 % | TEMPERATURE: 97.4 F | HEART RATE: 70 BPM | RESPIRATION RATE: 18 BRPM | HEIGHT: 64 IN | BODY MASS INDEX: 23.73 KG/M2 | DIASTOLIC BLOOD PRESSURE: 82 MMHG | SYSTOLIC BLOOD PRESSURE: 178 MMHG | WEIGHT: 139 LBS

## 2024-10-16 DIAGNOSIS — R04.0 EPISTAXIS: ICD-10-CM

## 2024-10-16 DIAGNOSIS — R51.9 NONINTRACTABLE EPISODIC HEADACHE, UNSPECIFIED HEADACHE TYPE: ICD-10-CM

## 2024-10-16 LAB
ATRIAL RATE - MUSE: 69 BPM
DIASTOLIC BLOOD PRESSURE - MUSE: NORMAL MMHG
ERYTHROCYTE [DISTWIDTH] IN BLOOD BY AUTOMATED COUNT: 13 % (ref 10–15)
HCT VFR BLD AUTO: 45.7 % (ref 35–47)
HGB BLD-MCNC: 15 G/DL (ref 11.7–15.7)
HOLD SPECIMEN: NORMAL
INTERPRETATION ECG - MUSE: NORMAL
MCH RBC QN AUTO: 28.7 PG (ref 26.5–33)
MCHC RBC AUTO-ENTMCNC: 32.8 G/DL (ref 31.5–36.5)
MCV RBC AUTO: 87 FL (ref 78–100)
P AXIS - MUSE: 74 DEGREES
PLATELET # BLD AUTO: 196 10E3/UL (ref 150–450)
PR INTERVAL - MUSE: 150 MS
QRS DURATION - MUSE: 72 MS
QT - MUSE: 408 MS
QTC - MUSE: 437 MS
R AXIS - MUSE: 55 DEGREES
RBC # BLD AUTO: 5.23 10E6/UL (ref 3.8–5.2)
SYSTOLIC BLOOD PRESSURE - MUSE: NORMAL MMHG
T AXIS - MUSE: 68 DEGREES
VENTRICULAR RATE- MUSE: 69 BPM
WBC # BLD AUTO: 7.6 10E3/UL (ref 4–11)

## 2024-10-16 PROCEDURE — 93005 ELECTROCARDIOGRAM TRACING: CPT | Performed by: EMERGENCY MEDICINE

## 2024-10-16 PROCEDURE — 85014 HEMATOCRIT: CPT | Performed by: EMERGENCY MEDICINE

## 2024-10-16 PROCEDURE — G1010 CDSM STANSON: HCPCS

## 2024-10-16 PROCEDURE — 250N000013 HC RX MED GY IP 250 OP 250 PS 637: Performed by: EMERGENCY MEDICINE

## 2024-10-16 PROCEDURE — 36415 COLL VENOUS BLD VENIPUNCTURE: CPT | Performed by: EMERGENCY MEDICINE

## 2024-10-16 PROCEDURE — 99285 EMERGENCY DEPT VISIT HI MDM: CPT | Mod: 25 | Performed by: EMERGENCY MEDICINE

## 2024-10-16 RX ORDER — ACETAMINOPHEN 325 MG/1
650 TABLET ORAL ONCE
Status: COMPLETED | OUTPATIENT
Start: 2024-10-16 | End: 2024-10-16

## 2024-10-16 RX ADMIN — ACETAMINOPHEN 325MG 650 MG: 325 TABLET ORAL at 13:24

## 2024-10-16 ASSESSMENT — COLUMBIA-SUICIDE SEVERITY RATING SCALE - C-SSRS
6. HAVE YOU EVER DONE ANYTHING, STARTED TO DO ANYTHING, OR PREPARED TO DO ANYTHING TO END YOUR LIFE?: NO
2. HAVE YOU ACTUALLY HAD ANY THOUGHTS OF KILLING YOURSELF IN THE PAST MONTH?: NO
1. IN THE PAST MONTH, HAVE YOU WISHED YOU WERE DEAD OR WISHED YOU COULD GO TO SLEEP AND NOT WAKE UP?: NO

## 2024-10-16 ASSESSMENT — ACTIVITIES OF DAILY LIVING (ADL)
ADLS_ACUITY_SCORE: 35
ADLS_ACUITY_SCORE: 35

## 2024-10-16 NOTE — ED PROVIDER NOTES
Emergency Department Note      History of Present Illness     Chief Complaint   Epistaxis and Headache      HPI   Louann Crocker is a 84 year old female with a history of hypertriglyceridemia, hypertension, arthritis, osteoporosis, Renee-Parkinson-White pattern, and anemia who presents to the ED for epistaxis and headache. She developed epistaxis around 1100 today. Patient reports blood coming from both nostrils, which was resolved by laying flat and putting pressure on the nose. Upon visit to the ED the bleeding has subsided. Patient notes the presence of a headache and dizziness following the nose bleed. She also notes left arm pain during movement, but suspects it is related to her arthritis. She denies any previous history of epistaxis, heart problems, or use of blood thinners. Patient notes a previous neck fusion on left side.    Independent Historian   None      Past Medical History     Medical History and Problem List   Anxiety  DDD (degenerative disc disease), cervical  DDD (degenerative disc disease), lumbar  Fibromyalgia  GERD (gastroesophageal reflux disease)  History of blood transfusion  IBS (irritable bowel syndrome)  MDD  Anomalous atrioventricular excitation  Calculus of kidney  Myalgia and myositis  Hypertriglyceridemia  Menopausal syndrome (hot flashes)  Biliary colic  S/P spinal surgery  Spondylosis  Spinal stenosis  Conversion disorder  Hypertension  Hyperglycemia  Sensorineural hearing loss, unilateral  Acquired trigger finger  Anemia  Herniated intervertebral disc  Hypercalcemia  Osteoarthritis of both wrists  Vitamin D deficiency  Renee-Parkinson-White pattern  Acute delirium poss due to medications    Medications   Voltaren  Cymbalta  Lialda  Multi-vitamin or tabs  Naproxen PO  Prilosec PO  Vitamin D, Cholecalciferol  Premarin  Tylenol extra strength  Prolia  Decarden  Ambien  Pepcid  Mobic  Lexapro  Paxil  Klonopin  Aspirin  Polyethylene  "Glycol  Lachydrin  Fibercon  Drisdol  Premarin  Zyprexa  Oxycontin  Percocet  Evista    Surgical History   Past Surgical History:   Procedure Laterality Date    APPENDECTOMY      ARTHROSCOPY KNEE RT/LT  2007    BACK SURGERY  5/2013    Sacroileac Fusion    BACK SURGERY  10/6/2014    Lumbar Fusion L3-L-4, L-4L-5    CATARACT IOL, RT/LT  2008    COLONOSCOPY      ENDOSCOPIC RETROGRADE CHOLANGIOPANCREATOGRAPHY  2005    HC DILATION/CURETTAGE DIAG/THER NON OB  1961    D & C    HC ERCP W SPHINCTEROTOMY  12/09    CBD stones    HC REMOVAL GALLBLADDER  1964    Cholecystectomy    HC REPAIR ROTATOR CUFF,ACUTE  3/90    SURGICAL HISTORY OF -   2005    cervical    SURGICAL HISTORY OF -   2005    lumbar     SURGICAL HISTORY OF -   2008    right knee replacement    VITRECTOMY PARSPLANA WITH 25 GAUGE SYSTEM  5/10/2012    Procedure:VITRECTOMY PARSPLANA WITH 25 GAUGE SYSTEM; LEFT EYE 25 GAUGE VITRECTOMY, MEMBRANE STRIPPING, AIR  FLUID EXCHANGE, GAS 15% C3F8; Surgeon:ATNONIETTA WALTERS; Location: [facility]    ZZC FUSION MC-P JOINT,SINGLE  2010    ZZC FUSION SHOULDER JOINT  1/01    lap bone sput    C ULLOA W/O FACETEC FORAMOT/DSKC 1/2 VRT SEG, LUMBAR  1993    ZZ LAP,BILIARY TRACT,UNLISTED  1966    [facility] LAPAROSCOPY, SURGICAL; W/ VAGINAL HYSTERECTOMY W/WO REMOVAL OVARY(S)/TUBES  1997    Laparoscopy, Vag Hysterectomy       Physical Exam     Patient Vitals for the past 24 hrs:   BP Temp Temp src Pulse Resp SpO2 Height Weight   10/16/24 1228 (!) 191/96 97.4  F (36.3  C) Oral 76 20 100 % 1.626 m (5' 4\") 63 kg (139 lb)     Physical Exam  General: Patient is alert and cooperative.  Tearful.   HENT:  No epistaxis; no fresh or clotted blood in nares.  No obvious punctate source of resolved bleeding.  Normal oropharynx.   Neck:  Normal range of motion and appearance.   Cardiovascular:  Normal rate.   Pulmonary/Chest:  Effort normal.   Musculoskeletal: Normal range of motion. No edema or tenderness.   Neurological: oriented, normal strength, sensation, and " coordination.   Skin: Warm and dry. No rash.  Scattered bruises on arms.   Psychiatric: tearful, anxious.     Diagnostics     Lab Results   Labs Ordered and Resulted from Time of ED Arrival to Time of ED Departure   CBC WITH PLATELETS - Abnormal       Result Value    WBC Count 7.6      RBC Count 5.23 (*)     Hemoglobin 15.0      Hematocrit 45.7      MCV 87      MCH 28.7      MCHC 32.8      RDW 13.0      Platelet Count 196         Imaging   CT Head w/o Contrast   Preliminary Result   IMPRESSION:   1. No acute intracranial hemorrhage, extra axial fluid collection, or   mass effect.   2. Brain atrophy and presumed chronic small vessel ischemic changes,   as described.           EKG   ECG taken at 1317, ECG read at 1343  Normal sinus rhythm with sinus arrhythmia  Normal ECG   Rate 69 bpm. OK interval 150 ms. QRS duration 72 ms. QT/QTc 408/437 ms. P-R-T axes 74 55 68.    Independent Interpretation   None    ED Course      Medications Administered   Medications   acetaminophen (TYLENOL) tablet 650 mg (650 mg Oral $Given 10/16/24 1324)       Procedures   Procedures     Discussion of Management   None    ED Course   ED Course as of 10/16/24 1429   Wed Oct 16, 2024   1256 I obtained history and examined the patient as noted above.     1422 I rechecked the patient and explained findings.       Additional Documentation  None    Medical Decision Making / Diagnosis     CMS Diagnoses: None    MIPS       None    MDM   Louann Crocker is a 84 year old female with acute atraumatic epistaxis.  Controlled PTA with direct pressure.  No anticoagulated or on asa.  No fever or uri symptoms.  Hx anxiety, tearful on arrival.  Also does complain of LUE discomfort, worse with movement.  No concern for cardiogenic etiology or indication for imaging.  EKG, cbc wnl.  No bleeding recurrence while in ED.  Discharged with reassurance.     Disposition   The patient was discharged.     Diagnosis     ICD-10-CM    1. Epistaxis  R04.0       2.  Nonintractable episodic headache, unspecified headache type  R51.9            Discharge Medications   New Prescriptions    No medications on file     I, ASHLEY JHA, am serving as a scribe at 1:08 PM on 10/16/2024 to document services personally performed by Samuel Kidd MD based on my observations and the provider's statements to me.      Samuel Kidd MD  10/17/24 0705

## 2024-10-16 NOTE — ED TRIAGE NOTES
"Pt presents for evaluation of a sudden onset epistaxis, headache and HTN with pain radiating down the LUE and in to left wrist. Started at 1100 while pt was \"emotionally distraught\". Nosebleed was on the right side. Pt applied pressure to naris and laid down, bleeding stopped after about 10 minutes. Pain is right side of head. Chronic pain in LUE. BP at home 170/81. Pt crying in triage.         "

## 2024-10-18 ENCOUNTER — PATIENT OUTREACH (OUTPATIENT)
Dept: INTERNAL MEDICINE | Facility: CLINIC | Age: 84
End: 2024-10-18
Payer: MEDICARE

## 2024-10-18 NOTE — TELEPHONE ENCOUNTER
"ED / Discharge Outreach Protocol    Patient Contact    Attempt # 1    Was call answered?  Yes.  \"May I please speak with <patient name>\"  Is patient available?   Yes      Transitions of Care Outreach  Chief Complaint   Patient presents with    Hospital F/U       Most Recent Admission Date: 10/16/2024   Most Recent Admission Diagnosis:      Most Recent Discharge Date: 10/16/2024   Most Recent Discharge Diagnosis: Epistaxis - R04.0  Nonintractable episodic headache, unspecified headache type - R51.9     Transitions of Care Assessment    Discharge Assessment  How are you doing now that you are home?: \"I am doing really well\"  How are your symptoms? (Red Flag symptoms escalate to triage hotline per guidelines): Improved  Do you know how to contact your clinic care team if you have future questions or changes to your health status? : Yes  Does the patient have their discharge instructions? : Yes  Does the patient have questions regarding their discharge instructions? : No  Were you started on any new medications or were there changes to any of your previous medications? : No  Does the patient have all of their medications?: Yes  Do you have questions regarding any of your medications? : No  Do you have all of your needed medical supplies or equipment (DME)?  (i.e. oxygen tank, CPAP, cane, etc.): Yes    Follow up Plan     Discharge Follow-Up  Discharge follow up appointment scheduled in alignment with recommended follow up timeframe or Transitions of Risk Category? (Low = within 30 days; Moderate= within 14 days; High= within 7 days): No  Patient's follow up appointment not scheduled: Patient declined scheduling support. Education on the importance of transitions of care follow up. Provided scheduling phone number.    Future Appointments   Date Time Provider Department Center   10/29/2024 10:00 AM Fiona Romo       Outpatient Plan as outlined on AVS reviewed with patient.    For any urgent concerns, please contact our " 24 hour nurse triage line: [phone] ([phone])       Madelyn Lomeli RN

## 2024-10-21 NOTE — TELEPHONE ENCOUNTER
Routing back to [facility] finance.    Thank you,  Les Lomeli, Triage RN [facility] [place]  1:53 PM 10/21/2024

## 2024-10-29 ENCOUNTER — ALLIED HEALTH/NURSE VISIT (OUTPATIENT)
Dept: NURSING | Facility: CLINIC | Age: 84
End: 2024-10-29
Payer: MEDICARE

## 2024-10-29 ENCOUNTER — TELEPHONE (OUTPATIENT)
Dept: INTERNAL MEDICINE | Facility: CLINIC | Age: 84
End: 2024-10-29

## 2024-10-29 DIAGNOSIS — M81.0 OSTEOPOROSIS WITHOUT CURRENT PATHOLOGICAL FRACTURE, UNSPECIFIED OSTEOPOROSIS TYPE: ICD-10-CM

## 2024-10-29 DIAGNOSIS — Z92.29 PERSONAL HISTORY OF OTHER DRUG THERAPY: Primary | ICD-10-CM

## 2024-10-29 DIAGNOSIS — M81.0 OSTEOPOROSIS WITHOUT CURRENT PATHOLOGICAL FRACTURE, UNSPECIFIED OSTEOPOROSIS TYPE: Primary | ICD-10-CM

## 2024-10-29 PROCEDURE — 96372 THER/PROPH/DIAG INJ SC/IM: CPT | Performed by: INTERNAL MEDICINE

## 2024-10-29 PROCEDURE — 99207 PR NO CHARGE NURSE ONLY: CPT | Performed by: INTERNAL MEDICINE

## 2024-10-29 NOTE — PROGRESS NOTES
Clinic Administered Medication Documentation      Prolia Documentation    Indication: Prolia  (denosumab) is a prescription medicine used to treat osteoporosis in patients who:   Are at high risk for fracture, meaning patients who have had a fracture related to osteoporosis, or who have multiple risk factors for fracture.  Cannot use another osteoporosis medicine or other osteoporosis medicines did not work well.  The timeline for early/late injections would be 4 weeks early and any time after the 6 month quinn. If a patient receives their injection late, then the subsequent injection would be 6 months from the date that they actually received the injection.    When was the last injection?  3/28/24 per spouse's report (was received in [place])  Was the last injection at least 6 months ago? Yes  Has the prior authorization been completed?  Yes  Is there an active order (written within the past 365 days, with administrations remaining, not ) in the chart?  Yes   GFR Estimate   Date Value Ref Range Status   10/07/2024 78 >60 mL/min/1.73m2 Final     Comment:     eGFR calculated using  CKD-EPI equation.   2018 >90 >60 mL/min/1.7m2 Final     Comment:     Non  GFR Calc     Has patient had a GFR within the last 12 months? Yes   Is GFR under 30, or patient has a diagnosis of CKD4 or CKD5? No   Patient denies gastric bypass or parathyroid surgery in past 6 months? Yes - patient denies.   Patient denies dental work in the past two months involving drilling into the bone, such as implants/extractions, oral surgery or a tooth extraction that has not healed yet?  Yes  Patient denies plans for an emergency tooth extraction within the next week? Yes    The following steps were completed to comply with the REMS program for Prolia:  Reviewed information in the Medication Guide, including the serious risks of Prolia  and the symptoms of each risk.  Advised patient to seek prompt medical attention if  they have signs or symptoms of any of the serious risks.  Provided each patient a copy of the Medication Guide and Patient Guide.    Prior to injection, verified patient identity using patient's name and date of birth. Medication was administered. Please see MAR and medication order for additional information. Patient instructed to remain in clinic for 15 minutes and report any adverse reaction to staff immediately.    Vial/Syringe: Syringe  Was this medication supplied by the patient? No  Patient has no refills remaining. Refill encounter opened, order pended and Routed to the provider    Assisted with scheduling next appointment.     Apr 29, 2025 10:00 AM  Nurse Only with Fiona Romo  [facility] ([facility] ) [phone]          Missy Long RN  [facility]

## 2024-10-29 NOTE — TELEPHONE ENCOUNTER
Patient seen for Prolia injection today. Plans to receive all future Prolia injections in [place], will need new Prolia order for her next injection in April. Future appointment scheduled. Prolia order pended and routed to provider to review.     Apr 29, 2025 10:00 AM  Nurse Only with Fiona Romo  [facility] ([facility] ) [phone]           Missy Long RN  [facility]

## 2025-02-18 ENCOUNTER — MYC MEDICAL ADVICE (OUTPATIENT)
Dept: INTERNAL MEDICINE | Facility: CLINIC | Age: 85
End: 2025-02-18
Payer: MEDICARE

## 2025-02-18 DIAGNOSIS — I35.9 AORTIC VALVE DISEASE: Primary | ICD-10-CM

## 2025-03-03 DIAGNOSIS — Z92.29 PERSONAL HISTORY OF OTHER DRUG THERAPY: Primary | ICD-10-CM

## 2025-03-03 DIAGNOSIS — M81.0 OSTEOPOROSIS WITHOUT CURRENT PATHOLOGICAL FRACTURE, UNSPECIFIED OSTEOPOROSIS TYPE: ICD-10-CM

## 2025-03-20 ENCOUNTER — OFFICE VISIT (OUTPATIENT)
Dept: INTERNAL MEDICINE | Facility: CLINIC | Age: 85
End: 2025-03-20
Payer: MEDICARE

## 2025-03-20 VITALS
OXYGEN SATURATION: 98 % | HEART RATE: 92 BPM | TEMPERATURE: 97.8 F | DIASTOLIC BLOOD PRESSURE: 74 MMHG | WEIGHT: 146.8 LBS | BODY MASS INDEX: 25.06 KG/M2 | RESPIRATION RATE: 16 BRPM | SYSTOLIC BLOOD PRESSURE: 130 MMHG | HEIGHT: 64 IN

## 2025-03-20 DIAGNOSIS — K52.9 COLITIS: Primary | ICD-10-CM

## 2025-03-20 DIAGNOSIS — Z12.31 ENCOUNTER FOR SCREENING MAMMOGRAM FOR MALIGNANT NEOPLASM OF BREAST: ICD-10-CM

## 2025-03-20 RX ORDER — DOCUSATE SODIUM 100 MG/1
100 CAPSULE, LIQUID FILLED ORAL DAILY
COMMUNITY

## 2025-03-20 NOTE — PROGRESS NOTES
"  Assessment & Plan     Colitis  At this time, I will place a referral to the gastroenterology service for further evaluation given her history of an unspecified type of colitis.  Patient and her  did wish to speak with the gastroenterologist about the possible need for a colonoscopy or other evaluation.  They did elect to reserve any further evaluation until speaking with the specialist.  - Adult GI  Referral - Consult Only; Future    Encounter for screening mammogram for malignant neoplasm of breast  - MA Screen Bilateral w/Rohan; Future          BMI  Estimated body mass index is 25.2 kg/m  as calculated from the following:    Height as of this encounter: 1.626 m (5' 4\").    Weight as of this encounter: 66.6 kg (146 lb 12.8 oz).         See Patient Instructions    Subjective   Pat is a 84 year old, presenting for the following health issues:  Follow Up        3/20/2025     1:01 PM   Additional Questions   Roomed by gt Da Silva   Accompanied by          3/20/2025     1:01 PM   Patient Reported Additional Medications   Patient reports taking the following new medications none     Patient is an 84-year-old  female who presents to the clinic for follow-up visit.  Her main concerns in regards to coming to the clinic today are a request for a mammogram and a referral to gastroenterology.  Patient reports that her last mammogram was completed in [place] in January 2024.  No abnormalities were reportedly found.  Patient states that she has never had an abnormal mammogram, and she would like to continue breast cancer screening at this time.  Patient and her  both report that she was diagnosed with an unspecified type of colitis in [place] in January 2024 as well.  She was on meds along me for approximately 6 months before relocating back to [place].  Patient reports that she is starting to experience some discomfort that occurs intermittently in her right lower abdominal quadrant.  " "Patient states this is how her previous bout of colitis did began.  She is hopeful that she can establish with a gastroenterologist at this time.          Review of Systems  CONSTITUTIONAL: NEGATIVE for fever, chills, change in weight  INTEGUMENTARY/SKIN: NEGATIVE for worrisome rashes, moles or lesions  ENT/MOUTH: NEGATIVE for ear, mouth and throat problems  RESP: NEGATIVE for significant cough or SOB  CV: NEGATIVE for chest pain, palpitations or peripheral edema  GI: NEGATIVE for nausea, abdominal pain, heartburn, or change in bowel habits  : NEGATIVE for frequency, dysuria, or hematuria  MUSCULOSKELETAL: NEGATIVE for significant arthralgias or myalgia  NEURO: NEGATIVE for weakness, dizziness or paresthesias      Objective    /74 (BP Location: Right arm, Patient Position: Sitting, Cuff Size: Adult Regular)   Pulse 92   Temp 97.8  F (36.6  C) (Oral)   Resp 16   Ht 1.626 m (5' 4\")   Wt 66.6 kg (146 lb 12.8 oz)   LMP  (LMP Unknown)   SpO2 98%   BMI 25.20 kg/m    Body mass index is 25.2 kg/m .  Physical Exam  Vitals reviewed.   Constitutional:       Appearance: Normal appearance.   HENT:      Head: Normocephalic and atraumatic.      Mouth/Throat:      Mouth: Mucous membranes are moist.      Pharynx: Oropharynx is clear.   Eyes:      Extraocular Movements: Extraocular movements intact.      Conjunctiva/sclera: Conjunctivae normal.      Pupils: Pupils are equal, round, and reactive to light.   Cardiovascular:      Rate and Rhythm: Normal rate and regular rhythm.      Pulses: Normal pulses.      Heart sounds: Normal heart sounds.   Pulmonary:      Effort: Pulmonary effort is normal.      Breath sounds: Normal breath sounds.   Skin:     General: Skin is warm and dry.   Neurological:      Mental Status: She is alert.                Signed Electronically by: Mohamud Goddard MD    "

## 2025-03-25 ENCOUNTER — HOSPITAL ENCOUNTER (OUTPATIENT)
Dept: MAMMOGRAPHY | Facility: CLINIC | Age: 85
Discharge: HOME | End: 2025-03-25
Attending: INTERNAL MEDICINE | Admitting: INTERNAL MEDICINE
Payer: MEDICARE

## 2025-03-25 DIAGNOSIS — Z12.31 ENCOUNTER FOR SCREENING MAMMOGRAM FOR MALIGNANT NEOPLASM OF BREAST: ICD-10-CM

## 2025-03-25 PROCEDURE — 77067 SCR MAMMO BI INCL CAD: CPT

## 2025-03-25 PROCEDURE — 77063 BREAST TOMOSYNTHESIS BI: CPT

## 2025-03-31 ENCOUNTER — ANCILLARY ORDERS (OUTPATIENT)
Dept: RADIOLOGY | Facility: CLINIC | Age: 85
End: 2025-03-31
Payer: MEDICARE

## 2025-04-03 ENCOUNTER — OFFICE VISIT (OUTPATIENT)
Dept: CARDIOLOGY | Facility: CLINIC | Age: 85
End: 2025-04-03
Payer: MEDICARE

## 2025-04-03 ENCOUNTER — ORDERS ONLY (AUTO-RELEASED) (OUTPATIENT)
Dept: CARDIOLOGY | Facility: CLINIC | Age: 85
End: 2025-04-03

## 2025-04-03 VITALS
WEIGHT: 149.2 LBS | HEIGHT: 64 IN | SYSTOLIC BLOOD PRESSURE: 131 MMHG | BODY MASS INDEX: 25.47 KG/M2 | OXYGEN SATURATION: 100 % | DIASTOLIC BLOOD PRESSURE: 78 MMHG | HEART RATE: 85 BPM

## 2025-04-03 DIAGNOSIS — I48.91 ATRIAL FIBRILLATION, UNSPECIFIED TYPE (H): ICD-10-CM

## 2025-04-03 DIAGNOSIS — E78.2 MIXED HYPERLIPIDEMIA: ICD-10-CM

## 2025-04-03 DIAGNOSIS — I05.9 MITRAL VALVE DISEASE: Primary | ICD-10-CM

## 2025-04-03 NOTE — PROGRESS NOTES
CARDIOLOGY CLINIC CONSULTATION      REASON FOR CONSULT:   Mixed mitral valve disease    PRIMARY CARE PHYSICIAN:  Mohamud Goddard        History of Present Illness   Louann Crocker is an extremely pleasant 84 year old female here as a new patient establish care.  She previously been seen by Dr. Arpit Higgins in [place].  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  She is a former smoker, but quit in her 30s.    She is here today to establish care after moving to [place].  Symptomatically she feels well from a cardiac standpoint.  She has had some mild chronic dyspnea on strenuous exertion for some time, but no significant change recently.  No chest pain issues.  No significant palpitations.  If she gets up too quickly she gets a bit lightheaded, but no syncope or near syncope.  She has had occasional falling episodes due to loss of balance.    Her most recent labs are from 10/2024, showing total cholesterol 188, HDL 58, , triglycerides 115, normal sodium and potassium, normal renal function, normal hemoglobin and normal platelets.  In reviewing her outside records, I was able to review her most recent clinic note from 1/2024, a 7-day Zio patch from 12/2021 which was most notable for primarily sinus rhythm with occasional PACs and 10 brief runs of AF (longest 9 beats-I suspect this may have actually been AT), as well as a 3% PVC burden.  I do not see the report from her most recent TTE in 1/2024, but the clinic note describes this as having mild mitral stenosis and moderate mitral regurgitation.  Finally, she did have an exercise MPI in 12/2021 which showed normal perfusion at stress and rest.      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  From a cardiac standpoint she is symptomatically doing well with no major acute symptoms.  I do think that we should repeat a TTE to follow the severity of her mitral valve disease and establish a baseline here.  In addition, she did have a report of some brief AF on her 2021 Zio patch, but I do not see any follow-up that was done on this.  While I would suspect that the 10 runs mentioned where the longest run lasted only 9 beats actually represented atrial tachycardia rather than AF, I do think that we should check another monitor now to make sure that she is not having more AF, particularly more long-lasting AF.  If so, we will need to have a discussion about pros and cons of anticoagulation, particularly in light of some of her falling issues.  She may be a good candidate for a Watchman device in that case.    Finally, I did want to note that she told me an excellent joke on my way out following our visit, and I will need to ask her for more jokes at future visits.        -TTE  -14-day Zio patch  -Further plans pending results of above testing as described above        Follow-up: 3 months, or sooner as needed        Florian Brewster MD  Interventional Cardiology  April 3, 2025      The longitudinal plan of care for the diagnosis(es)/condition(s) as documented were addressed during this visit. Due to the added complexity in care, I will continue to support Milena in the subsequent management and with ongoing continuity of care.        Medications   Current Outpatient Medications   Medication Sig Dispense Refill    Cyanocobalamin (B-12) 1000 MCG TBCR Take 1,000 mcg by mouth daily 100  tablet 1    diclofenac (VOLTAREN) 1 % topical gel Apply topically as needed      docusate sodium (COLACE) 100 MG capsule Take 100 mg by mouth daily.      DULoxetine (CYMBALTA) 20 MG capsule TAKE 1 CAPSULE BY MOUTH THREE TIMES DAILY      MULTI-VITAMIN OR TABS 1 TABLET DAILY 30 0    NAPROXEN PO Take 220-440 mg by mouth 2 times daily as needed for moderate pain      Omeprazole (PRILOSEC PO) Take 20 mg by mouth every morning      Pyridoxine HCl (VITAMIN B6 PO) Take 100 mg by mouth daily      Vitamin D, Cholecalciferol, 25 MCG (1000 UT) TABS        Current Facility-Administered Medications   Medication Dose Route Frequency Provider Last Rate Last Admin    [START ON 4/29/2025] denosumab (PROLIA) injection 60 mg  60 mg Subcutaneous Q6 Months          Allergies   Allergies   Allergen Reactions    Mirtazapine Other (See Comments)     Hyperactivity and increased pain.    Adhesive Tape     Cyclobenzaprine     Flagyl [Metronidazole Hcl]      Imidazole antifungals, HIVES & RASH    Lactose     Nickel      rash         Physical Exam       BP: 131/78 Pulse: 85     SpO2: 100 %      Vital Signs with Ranges  Pulse:  [85] 85  BP: (131)/(78) 131/78  SpO2:  [100 %] 100 %  149 lbs 3.2 oz    Constitutional: Well-appearing, no acute distress  Respiratory: Normal respiratory effort, CTAB  Cardiovascular: RRR, faint systolic murmur at the apex.  JVP < 7 cm H2O.  There is no LE edema.  Normal carotid upstrokes, no carotid bruits.

## 2025-04-03 NOTE — LETTER
4/3/2025    Mohamud Goddard MD  [address] [facility] [address]    RE: Louann Crocker       Dear Colleague,     I had the pleasure of seeing Louann Crocker in the [facility].  CARDIOLOGY CLINIC CONSULTATION      REASON FOR CONSULT:   Mixed mitral valve disease    PRIMARY CARE PHYSICIAN:  Mohamud Goddard        History of Present Illness  Louann Crocker is an extremely pleasant 84 year old female here as a new patient establish care.  She previously been seen by Dr. Arpit Higgins in [place].  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  She is a former smoker, but quit in her 30s.    She is here today to establish care after moving to [place].  Symptomatically she feels well from a cardiac standpoint.  She has had some mild chronic dyspnea on strenuous exertion for some time, but no significant change recently.  No chest pain issues.  No significant palpitations.  If she gets up too quickly she gets a bit lightheaded, but no syncope or near syncope.  She has had occasional falling episodes due to loss of balance.    Her most recent labs are from 10/2024, showing total cholesterol 188, HDL 58, , triglycerides 115, normal sodium and potassium, normal renal function, normal hemoglobin and normal platelets.  In reviewing her outside records, I was able to review her most recent clinic note from 1/2024, a 7-day Zio patch from 12/2021 which was most notable for primarily sinus rhythm with occasional PACs and 10 brief runs of AF (longest 9 beats-I suspect this may have actually been AT), as well as a 3% PVC burden.  I do not see the report from her most recent TTE in 1/2024, but the clinic note describes this as having mild mitral stenosis and moderate mitral  regurgitation.  Finally, she did have an exercise MPI in 12/2021 which showed normal perfusion at stress and rest.      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  From a cardiac standpoint she is symptomatically doing well with no major acute symptoms.  I do think that we should repeat a TTE to follow the severity of her mitral valve disease and establish a baseline here.  In addition, she did have a report of some brief AF on her 2021 Zio patch, but I do not see any follow-up that was done on this.  While I would suspect that the 10 runs mentioned where the longest run lasted only 9 beats actually represented atrial tachycardia rather than AF, I do think that we should check another monitor now to make sure that she is not having more AF, particularly more long-lasting AF.  If so, we will need to have a discussion about pros and cons of anticoagulation, particularly in light of some of her falling issues.  She may be a good candidate for a Watchman device in that case.    Finally, I did want to note that she told me an excellent joke on my way out following our visit, and I will need to ask her for more jokes at future visits.        -TTE  -14-day Zio patch  -Further plans pending results of above testing as described above        Follow-up: 3 months, or sooner as needed        Florian Brewsetr MD  Interventional Cardiology  April 3, 2025      The longitudinal plan of care for the diagnosis(es)/condition(s) as documented were addressed during this visit. Due to the added complexity in care, I will continue to support Milena in the  subsequent management and with ongoing continuity of care.        Medications  Current Outpatient Medications   Medication Sig Dispense Refill     Cyanocobalamin (B-12) 1000 MCG TBCR Take 1,000 mcg by mouth daily 100 tablet 1     diclofenac (VOLTAREN) 1 % topical gel Apply topically as needed       docusate sodium (COLACE) 100 MG capsule Take 100 mg by mouth daily.       DULoxetine (CYMBALTA) 20 MG capsule TAKE 1 CAPSULE BY MOUTH THREE TIMES DAILY       MULTI-VITAMIN OR TABS 1 TABLET DAILY 30 0     NAPROXEN PO Take 220-440 mg by mouth 2 times daily as needed for moderate pain       Omeprazole (PRILOSEC PO) Take 20 mg by mouth every morning       Pyridoxine HCl (VITAMIN B6 PO) Take 100 mg by mouth daily       Vitamin D, Cholecalciferol, 25 MCG (1000 UT) TABS        Current Facility-Administered Medications   Medication Dose Route Frequency Provider Last Rate Last Admin     [START ON 4/29/2025] denosumab (PROLIA) injection 60 mg  60 mg Subcutaneous Q6 Months          Allergies  Allergies   Allergen Reactions     Mirtazapine Other (See Comments)     Hyperactivity and increased pain.     Adhesive Tape      Cyclobenzaprine      Flagyl [Metronidazole Hcl]      Imidazole antifungals, HIVES & RASH     Lactose      Nickel      rash         Physical Exam      BP: 131/78 Pulse: 85     SpO2: 100 %      Vital Signs with Ranges  Pulse:  [85] 85  BP: (131)/(78) 131/78  SpO2:  [100 %] 100 %  149 lbs 3.2 oz    Constitutional: Well-appearing, no acute distress  Respiratory: Normal respiratory effort, CTAB  Cardiovascular: RRR, faint systolic murmur at the apex.  JVP < 7 cm H2O.  There is no LE edema.  Normal carotid upstrokes, no carotid bruits.      Thank you for allowing me to participate in the care of your patient.      Sincerely,     Florian Brewster MD     [facility]  cc:   Mohamud Goddard MD  [address] [place] [address]

## 2025-04-21 ENCOUNTER — VIRTUAL VISIT (OUTPATIENT)
Dept: GASTROENTEROLOGY | Facility: CLINIC | Age: 85
End: 2025-04-21
Attending: INTERNAL MEDICINE
Payer: MEDICARE

## 2025-04-21 DIAGNOSIS — K52.832 LYMPHOCYTIC COLITIS: ICD-10-CM

## 2025-04-21 PROCEDURE — 98002 SYNCH AUDIO-VIDEO NEW MOD 45: CPT | Performed by: PHYSICIAN ASSISTANT

## 2025-04-21 RX ORDER — BUDESONIDE 3 MG/1
9 CAPSULE, COATED PELLETS ORAL EVERY MORNING
Qty: 270 CAPSULE | Refills: 0 | Status: SHIPPED | OUTPATIENT
Start: 2025-04-21

## 2025-04-21 NOTE — NURSING NOTE
Current patient location: [address] [place] [address]    Is the patient currently in the state of [place]? YES    Visit mode: VIDEO    If the visit is dropped, the patient can be reconnected by:VIDEO VISIT: Send to e-mail at: [email or web address]    Will anyone else be joining the visit? Yes, spouse will join patient  (If patient encounters technical issues they should call [phone]    Are changes needed to the allergy or medication list? No    Are refills needed on medications prescribed by this physician? NO    Rooming Documentation:  Not applicable    Reason for visit: Consult    Lavenr SERNA

## 2025-04-21 NOTE — PROGRESS NOTES
GASTROENTEROLOGY NEW PATIENT VIDEO VISIT    CC/REFERRING MD:    Mohamud Goddard    REASON FOR CONSULTATION:   Mohamud Goddard for   Chief Complaint   Patient presents with    Consult       HISTORY OF PRESENT ILLNESS:    Louann Crocker is a 84 year old female with a past medical history significant for fibromyalgia, mild cognitive change, depression, anxiety, history of lumbar surgery, GERD, and IBS who is being evaluated via a billable video visit for lymphocytic colitis.  She is accompanied by her , Amador, who helps provide history.  Louann states that a little over a year ago, she was in a spell of roughly 6 months of persisting right lower quadrant abdominal pain, frequent loose stool, ultimately undergoing colonoscopy in March 2024 which was apparently notable for lymphocytic colitis.  I do not have records but they have paper records with them with these results.  She was treated with a combination of mesalamine 1.2 g twice daily and budesonide 3 mg twice daily for a total of 6 months and her symptoms resolved with these treatments.  Over the last several weeks, she feels like the symptoms are starting to recur again.  Prior to that, she was having some right lower quadrant pain in association with constipation as well.    There was no mention of any polyps on this most recent colonoscopy from last year, she did have 6 mm TA on colonoscopy in August 2018.  They are wondering if she needs another colonoscopy now.    She does have naproxen for use as needed, but does not take regularly.  She does currently take Prilosec regularly, sounds like she has had some issues with GERD and dysphagia in the past.  Not currently experiencing any upper digestive distress.    Surgical history pertinent for appendectomy, ERCP with sphincterotomy, hysterectomy, cholecystectomy.      I have reviewed and updated the patient's Past Medical History, Social History, Family  History and Medication List.    Exam:    General appearance:  Healthy appearing adult, in no acute distress  Eyes:  Sclera anicteric, Pupils round and reactive to light  Ears, nose, mouth and throat:  No obvious external lesions of ears and nose.  Hearing intact  Neck:  Symmetric, No obvious external lesions  Respiratory:  Normal respiration, no use of accessory muscles   MSK:  No visual upper extremity, neck or facial muscle atrophy  ABD:  No visual abdominal distention, no audible borborygmi  Skin:  No rashes or jaundice   Psychiatric:  Oriented to person, place and time, Appropriate mood and affect.   Neurologic:  Peripheral muscle function and dexterity appear to be intact      PERTINENT STUDIES have been reviewed.    ASSESSMENT/PLAN:    Louann Crocker is a 84 year old female who presents for evaluation of what sounds like a possible recurrence of lymphocytic colitis, which was diagnosed a little over a year ago and treated with 6 months of both mesalamine and budesonide.  She also recalls having some issues with constipation and right lower quadrant abdominal pain as well prior to the current episode.  We will work on getting her records so I can review her last colonoscopy.  We reviewed that we can typically treat lymphocytic colitis with just budesonide and the impact of mesalamine is less clear, and so we will just start with high-dose budesonide and see how she does over the next week.  I wonder if you might have had some more general inflammation found, more suggestive of ulcerative colitis, which is why the mesalamine was added.  Other etiologies of diarrhea are also possible, she is status post cholecystectomy so she could have bile acid diarrhea, also describes this longstanding history of IBS and actually more issues with constipation, so this could be overflow as well.  For now, I would like to see how she does and budesonide.  If not improving, would likely consider stool testing, brief imaging  such as x-ray, and consideration of colonoscopy if the diagnosis was still unclear after that.    1. Lymphocytic colitis  - Adult GI  Referral - Consult Only  - budesonide (ENTOCORT EC) 3 MG EC capsule; Take 3 capsules (9 mg) by mouth every morning.  Dispense: 270 capsule; Refill: 0        Video-Visit Details    Video Visit Time: 12 minutes    Type of service:  Video Visit    Originating Location (pt. Location): Home    Distant Location (provider location):  Off-site    Platform used for Video Visit: Jesus Valera PA-C      Thank you for this consultation.  It was a pleasure to participate in the care of this patient; please contact us with any further questions.  A total of 22 minutes was spent with reviewing the chart, discussing with the patient, documentation and coordination of care on 4/21/2025.    This note was created with voice recognition software, and while reviewed for accuracy, typos may remain.     Johnathon Valera PA-C  Division of Gastroenterology, Hepatology and Nutrition  [facility]  [phone]

## 2025-04-23 ENCOUNTER — LAB (OUTPATIENT)
Dept: LAB | Facility: CLINIC | Age: 85
End: 2025-04-23
Payer: MEDICARE

## 2025-04-23 DIAGNOSIS — G31.84 MILD COGNITIVE IMPAIRMENT: Primary | ICD-10-CM

## 2025-04-23 PROCEDURE — 84443 ASSAY THYROID STIM HORMONE: CPT

## 2025-04-23 PROCEDURE — 82306 VITAMIN D 25 HYDROXY: CPT | Mod: GA

## 2025-04-23 PROCEDURE — 99000 SPECIMEN HANDLING OFFICE-LAB: CPT

## 2025-04-23 PROCEDURE — 82607 VITAMIN B-12: CPT

## 2025-04-23 PROCEDURE — 36415 COLL VENOUS BLD VENIPUNCTURE: CPT

## 2025-04-23 PROCEDURE — 84393 TAU PHOSPHORYLATED EA: CPT | Mod: 90

## 2025-04-23 PROCEDURE — 81401 MOPATH PROCEDURE LEVEL 2: CPT | Mod: 90

## 2025-04-24 LAB
Lab: NORMAL
PERFORMING LABORATORY: NORMAL
SPECIMEN STATUS: NORMAL
TEST NAME: NORMAL
TSH SERPL DL<=0.005 MIU/L-ACNC: 0.73 UIU/ML (ref 0.3–4.2)
VIT B12 SERPL-MCNC: 1081 PG/ML (ref 232–1245)
VIT D+METAB SERPL-MCNC: 40 NG/ML (ref 20–50)

## 2025-04-29 ENCOUNTER — ALLIED HEALTH/NURSE VISIT (OUTPATIENT)
Dept: NURSING | Facility: CLINIC | Age: 85
End: 2025-04-29
Payer: MEDICARE

## 2025-04-29 DIAGNOSIS — M81.0 OSTEOPOROSIS WITHOUT CURRENT PATHOLOGICAL FRACTURE, UNSPECIFIED OSTEOPOROSIS TYPE: Primary | ICD-10-CM

## 2025-04-29 LAB
APOE ALLELE E2+E3+E4 BLD/T: NORMAL
CV ZIO PRELIM RESULTS: NORMAL
SPECIMEN SOURCE: NORMAL

## 2025-04-29 PROCEDURE — 99207 PR NO CHARGE NURSE ONLY: CPT

## 2025-04-29 PROCEDURE — 96372 THER/PROPH/DIAG INJ SC/IM: CPT | Performed by: INTERNAL MEDICINE

## 2025-04-29 NOTE — PROGRESS NOTES
Clinic Administered Medication Documentation      Prolia Documentation    Indication: Prolia  (denosumab) is a prescription medicine used to treat osteoporosis in patients who:   Are at high risk for fracture, meaning patients who have had a fracture related to osteoporosis, or who have multiple risk factors for fracture.  Cannot use another osteoporosis medicine or other osteoporosis medicines did not work well.  The timeline for early/late injections would be 4 weeks early and any time after the 6 month quinn. If a patient receives their injection late, then the subsequent injection would be 6 months from the date that they actually received the injection.    When was the last injection?  10/29/2024  Was the last injection at least 6 months ago? Yes  Has the prior authorization been completed?  Yes  Is there an active order (written within the past 365 days, with administrations remaining, not ) in the chart?  Yes   GFR Estimate   Date Value Ref Range Status   10/07/2024 78 >60 mL/min/1.73m2 Final     Comment:     eGFR calculated using  CKD-EPI equation.   2018 >90 >60 mL/min/1.7m2 Final     Comment:     Non  GFR Calc     Has patient had a GFR within the last 12 months? Yes   Is GFR under 30, or patient has a diagnosis of CKD4 or CKD5? No   Patient denies gastric bypass or parathyroid surgery in past 6 months? Yes - patient denies.   Patient denies undergoing any dental procedures involving drilling into the bone, such as implants, extractions, or oral surgery, within the past two months that have not yet healed?  Yes - patient denies  Patient denies plans for an emergency tooth extraction within the next week? Yes    The following steps were completed to comply with the REMS program for Prolia:  Reviewed information in the Medication Guide, including the serious risks of Prolia  and the symptoms of each risk.  Advised patient to seek prompt medical attention if they have signs or  symptoms of any of the serious risks.  Provided each patient a copy of the Medication Guide and Patient Guide.    Prior to injection, verified patient identity using patient's name and date of birth. Medication was administered. Please see MAR and medication order for additional information. Patient instructed to remain in clinic for 15 minutes and report any adverse reaction to staff immediately.    Vial/Syringe: Syringe  Was this medication supplied by the patient? No  Verified that the patient has administrations remaining in their prescription.

## 2025-05-03 LAB — LABCORP INTERFACED MISCELLANEOUS TEST RESULT: NORMAL

## 2025-05-07 ENCOUNTER — TELEPHONE (OUTPATIENT)
Dept: GASTROENTEROLOGY | Facility: CLINIC | Age: 85
End: 2025-05-07
Payer: MEDICARE

## 2025-05-07 DIAGNOSIS — K52.832 LYMPHOCYTIC COLITIS: Primary | ICD-10-CM

## 2025-05-07 RX ORDER — MESALAMINE 1.2 G/1
2400 TABLET, DELAYED RELEASE ORAL
Qty: 60 TABLET | Refills: 2 | Status: SHIPPED | OUTPATIENT
Start: 2025-05-07

## 2025-05-07 NOTE — TELEPHONE ENCOUNTER
LPN spoke with pt  instructed him of the new medication for his wife they are familiar with it had no additional questions. Verbalized understanding.

## 2025-05-07 NOTE — TELEPHONE ENCOUNTER
Thanks for speaking with her.  I am going to send an additional prescription for a medicine called mesalamine.  She was on this previously for her colitis.  She will take this in addition to the budesonide.    Thanks,    Johnathon Valera PA-C

## 2025-05-07 NOTE — TELEPHONE ENCOUNTER
Nguyễn Diego,    You send a Bio message to pt she still hasn't read the Bio message. I called pt spoke to her and states she is doing better than before with budesonide but still experiencing symptoms. For example, she states she is still having right lower quadrant abdominal pain. Pain scale went from 7 to 3 with the med. Also pt states she still gets constipation and diarrhea at times. Please advise.     Thanks,  JOSELUIS Shepard

## 2025-05-07 NOTE — TELEPHONE ENCOUNTER
Hi Pat,     Just checking in to see how you are doing.  I wanted to make sure that you are improving on the budesonide.  I tried to call the preferred phone number on file and I was not able to leave a message, hence this [email or web address] message.     Please let me know if you have any questions.

## 2025-05-27 ENCOUNTER — TELEPHONE (OUTPATIENT)
Dept: CARDIOLOGY | Facility: CLINIC | Age: 85
End: 2025-05-27
Payer: MEDICARE

## 2025-05-27 NOTE — TELEPHONE ENCOUNTER
Spoke to spouse for the patient to call back and schedule the following:    Appointment type:  Testing only- No clinic visit  Provider:  MALATHI  Return date:  next available  Additional appointment(s) needed:  Lab work too  Additional Notes:  NA  Specialty phone number: [phone]    Rosemarie Jaeger on 5/27/2025 at 1:45 PM

## 2025-05-27 NOTE — TELEPHONE ENCOUNTER
[facility] Call Center    Phone Message    May a detailed message be left on voicemail: yes     Reason for Call: Patient spouse calling back to schedule labs and Lexiscan stress test. Called priority no answer. Please call back to schedule.      Action Taken: Other: Cardio     Travel Screening: Not Applicable     Date of Service:             Thank you!  Specialty Access Center

## 2025-06-09 ENCOUNTER — HOSPITAL ENCOUNTER (OUTPATIENT)
Dept: NUCLEAR MEDICINE | Facility: CLINIC | Age: 85
Setting detail: NUCLEAR MEDICINE
Discharge: HOME | End: 2025-06-09
Attending: INTERNAL MEDICINE
Payer: MEDICARE

## 2025-06-09 ENCOUNTER — HOSPITAL ENCOUNTER (OUTPATIENT)
Dept: CARDIOLOGY | Facility: CLINIC | Age: 85
Setting detail: NUCLEAR MEDICINE
Discharge: HOME | End: 2025-06-09
Attending: INTERNAL MEDICINE
Payer: MEDICARE

## 2025-06-09 DIAGNOSIS — I49.3 FREQUENT PVCS: ICD-10-CM

## 2025-06-09 DIAGNOSIS — I48.91 UNSPECIFIED ATRIAL FIBRILLATION (H): ICD-10-CM

## 2025-06-09 LAB
CV STRESS MAX HR HE: 96
RATE PRESSURE PRODUCT: NORMAL
STRESS ECHO BASELINE DIASTOLIC HE: 71
STRESS ECHO BASELINE HR: 78 BPM
STRESS ECHO BASELINE SYSTOLIC BP: 147
STRESS ECHO CALCULATED PERCENT HR: 71 %
STRESS ECHO LAST STRESS DIASTOLIC BP: 67
STRESS ECHO LAST STRESS SYSTOLIC BP: 137
STRESS ECHO TARGET HR: 136

## 2025-06-09 PROCEDURE — 78452 HT MUSCLE IMAGE SPECT MULT: CPT

## 2025-06-09 PROCEDURE — 93017 CV STRESS TEST TRACING ONLY: CPT

## 2025-06-09 PROCEDURE — 93018 CV STRESS TEST I&R ONLY: CPT | Performed by: INTERNAL MEDICINE

## 2025-06-09 PROCEDURE — 250N000011 HC RX IP 250 OP 636: Mod: JZ | Performed by: INTERNAL MEDICINE

## 2025-06-09 PROCEDURE — A9500 TC99M SESTAMIBI: HCPCS | Performed by: INTERNAL MEDICINE

## 2025-06-09 PROCEDURE — 93016 CV STRESS TEST SUPVJ ONLY: CPT | Performed by: INTERNAL MEDICINE

## 2025-06-09 PROCEDURE — 78452 HT MUSCLE IMAGE SPECT MULT: CPT | Mod: 26 | Performed by: INTERNAL MEDICINE

## 2025-06-09 PROCEDURE — 343N000001 HC RX 343 MED OP 636: Performed by: INTERNAL MEDICINE

## 2025-06-09 RX ORDER — LIDOCAINE 40 MG/G
CREAM TOPICAL
Status: DISCONTINUED | OUTPATIENT
Start: 2025-06-09 | End: 2025-06-10 | Stop reason: HOSPADM

## 2025-06-09 RX ORDER — ALBUTEROL SULFATE 90 UG/1
2 INHALANT RESPIRATORY (INHALATION) EVERY 5 MIN PRN
Status: DISCONTINUED | OUTPATIENT
Start: 2025-06-09 | End: 2025-06-10 | Stop reason: HOSPADM

## 2025-06-09 RX ORDER — REGADENOSON 0.08 MG/ML
INJECTION, SOLUTION INTRAVENOUS
Status: DISCONTINUED
Start: 2025-06-09 | End: 2025-06-09 | Stop reason: HOSPADM

## 2025-06-09 RX ORDER — AMINOPHYLLINE 25 MG/ML
50-100 INJECTION, SOLUTION INTRAVENOUS
Status: DISCONTINUED | OUTPATIENT
Start: 2025-06-09 | End: 2025-06-10 | Stop reason: HOSPADM

## 2025-06-09 RX ORDER — REGADENOSON 0.08 MG/ML
0.4 INJECTION, SOLUTION INTRAVENOUS ONCE
Status: COMPLETED | OUTPATIENT
Start: 2025-06-09 | End: 2025-06-09

## 2025-06-09 RX ORDER — CAFFEINE 200 MG
200 TABLET ORAL
Status: DISCONTINUED | OUTPATIENT
Start: 2025-06-09 | End: 2025-06-09 | Stop reason: HOSPADM

## 2025-06-09 RX ORDER — CAFFEINE CITRATE 20 MG/ML
60 SOLUTION INTRAVENOUS
Status: DISCONTINUED | OUTPATIENT
Start: 2025-06-09 | End: 2025-06-09 | Stop reason: HOSPADM

## 2025-06-09 RX ADMIN — TECHNETIUM TC 99M SESTAMIBI 11 MILLICURIE: 1 INJECTION INTRAVENOUS at 10:47

## 2025-06-09 RX ADMIN — TECHNETIUM TC 99M SESTAMIBI 33 MILLICURIE: 1 INJECTION INTRAVENOUS at 12:10

## 2025-06-09 RX ADMIN — REGADENOSON 0.4 MG: 0.08 INJECTION, SOLUTION INTRAVENOUS at 12:09

## 2025-06-19 ENCOUNTER — RESULTS FOLLOW-UP (OUTPATIENT)
Dept: CARDIOLOGY | Facility: CLINIC | Age: 85
End: 2025-06-19

## 2025-07-06 ENCOUNTER — MYC MEDICAL ADVICE (OUTPATIENT)
Dept: GASTROENTEROLOGY | Facility: CLINIC | Age: 85
End: 2025-07-06
Payer: MEDICARE

## 2025-07-06 DIAGNOSIS — K52.832 LYMPHOCYTIC COLITIS: Primary | ICD-10-CM

## 2025-07-07 RX ORDER — MESALAMINE 1.2 G/1
2400 TABLET, DELAYED RELEASE ORAL
Qty: 60 TABLET | Refills: 2 | Status: SHIPPED | OUTPATIENT
Start: 2025-07-07

## 2025-07-07 RX ORDER — BUDESONIDE 3 MG/1
CAPSULE, COATED PELLETS ORAL
Qty: 90 CAPSULE | Refills: 0 | Status: SHIPPED | OUTPATIENT
Start: 2025-07-07 | End: 2025-09-05

## 2025-07-08 ENCOUNTER — HOSPITAL ENCOUNTER (EMERGENCY)
Facility: CLINIC | Age: 85
End: 2025-07-08
Payer: MEDICARE

## 2025-07-21 ENCOUNTER — HOSPITAL ENCOUNTER (OUTPATIENT)
Dept: CARDIOLOGY | Facility: CLINIC | Age: 85
Discharge: HOME | End: 2025-07-21
Attending: INTERNAL MEDICINE | Admitting: INTERNAL MEDICINE
Payer: MEDICARE

## 2025-07-21 DIAGNOSIS — I05.9 MITRAL VALVE DISEASE: ICD-10-CM

## 2025-07-21 LAB — LVEF ECHO: NORMAL

## 2025-07-21 PROCEDURE — 93306 TTE W/DOPPLER COMPLETE: CPT

## 2025-07-21 PROCEDURE — 93306 TTE W/DOPPLER COMPLETE: CPT | Mod: 26 | Performed by: INTERNAL MEDICINE

## 2025-08-14 ENCOUNTER — OFFICE VISIT (OUTPATIENT)
Dept: CARDIOLOGY | Facility: CLINIC | Age: 85
End: 2025-08-14
Attending: INTERNAL MEDICINE
Payer: MEDICARE

## 2025-08-14 ENCOUNTER — LAB (OUTPATIENT)
Dept: LAB | Facility: CLINIC | Age: 85
End: 2025-08-14
Payer: MEDICARE

## 2025-08-14 VITALS
DIASTOLIC BLOOD PRESSURE: 68 MMHG | SYSTOLIC BLOOD PRESSURE: 138 MMHG | HEIGHT: 64 IN | HEART RATE: 72 BPM | WEIGHT: 144.1 LBS | BODY MASS INDEX: 24.6 KG/M2

## 2025-08-14 DIAGNOSIS — I49.3 FREQUENT PVCS: ICD-10-CM

## 2025-08-14 DIAGNOSIS — E78.2 MIXED HYPERLIPIDEMIA: ICD-10-CM

## 2025-08-14 DIAGNOSIS — I47.10 SVT (SUPRAVENTRICULAR TACHYCARDIA): ICD-10-CM

## 2025-08-14 DIAGNOSIS — I05.9 MITRAL VALVE DISEASE: Primary | ICD-10-CM

## 2025-08-14 LAB
ANION GAP SERPL CALCULATED.3IONS-SCNC: 10 MMOL/L (ref 7–15)
BUN SERPL-MCNC: 27.7 MG/DL (ref 8–23)
CALCIUM SERPL-MCNC: 9.4 MG/DL (ref 8.8–10.4)
CHLORIDE SERPL-SCNC: 106 MMOL/L (ref 98–107)
CREAT SERPL-MCNC: 0.75 MG/DL (ref 0.51–0.95)
EGFRCR SERPLBLD CKD-EPI 2021: 78 ML/MIN/1.73M2
ERYTHROCYTE [DISTWIDTH] IN BLOOD BY AUTOMATED COUNT: 14.2 % (ref 10–15)
GLUCOSE SERPL-MCNC: 96 MG/DL (ref 70–99)
HCO3 SERPL-SCNC: 23 MMOL/L (ref 22–29)
HCT VFR BLD AUTO: 42.3 % (ref 35–47)
HGB BLD-MCNC: 13.4 G/DL (ref 11.7–15.7)
MAGNESIUM SERPL-MCNC: 1.7 MG/DL (ref 1.7–2.3)
MCH RBC QN AUTO: 28.2 PG (ref 26.5–33)
MCHC RBC AUTO-ENTMCNC: 31.7 G/DL (ref 31.5–36.5)
MCV RBC AUTO: 89.1 FL (ref 78–100)
PLATELET # BLD AUTO: 169 10E3/UL (ref 150–450)
POTASSIUM SERPL-SCNC: 4 MMOL/L (ref 3.4–5.3)
RBC # BLD AUTO: 4.75 10E6/UL (ref 3.8–5.2)
SODIUM SERPL-SCNC: 139 MMOL/L (ref 135–145)
WBC # BLD AUTO: 10.52 10E3/UL (ref 4–11)

## 2025-08-16 ENCOUNTER — HEALTH MAINTENANCE LETTER (OUTPATIENT)
Age: 85
End: 2025-08-16

## 2025-08-19 ENCOUNTER — MYC MEDICAL ADVICE (OUTPATIENT)
Dept: INTERNAL MEDICINE | Facility: CLINIC | Age: 85
End: 2025-08-19

## 2025-08-19 ENCOUNTER — APPOINTMENT (OUTPATIENT)
Dept: CT IMAGING | Facility: CLINIC | Age: 85
End: 2025-08-19
Attending: EMERGENCY MEDICINE
Payer: MEDICARE

## 2025-08-19 ENCOUNTER — MYC MEDICAL ADVICE (OUTPATIENT)
Dept: GASTROENTEROLOGY | Facility: CLINIC | Age: 85
End: 2025-08-19
Payer: MEDICARE

## 2025-08-19 ENCOUNTER — HOSPITAL ENCOUNTER (EMERGENCY)
Facility: CLINIC | Age: 85
Discharge: HOME | End: 2025-08-19
Attending: EMERGENCY MEDICINE | Admitting: EMERGENCY MEDICINE
Payer: MEDICARE

## 2025-08-19 VITALS
DIASTOLIC BLOOD PRESSURE: 93 MMHG | WEIGHT: 147.93 LBS | HEART RATE: 78 BPM | SYSTOLIC BLOOD PRESSURE: 192 MMHG | OXYGEN SATURATION: 99 % | BODY MASS INDEX: 25.25 KG/M2 | TEMPERATURE: 97.1 F | RESPIRATION RATE: 20 BRPM | HEIGHT: 64 IN

## 2025-08-19 DIAGNOSIS — N39.0 UTI (URINARY TRACT INFECTION), UNCOMPLICATED: ICD-10-CM

## 2025-08-19 DIAGNOSIS — K62.5 RECTAL BLEEDING: Primary | ICD-10-CM

## 2025-08-19 LAB
ABO + RH BLD: ABNORMAL
ALBUMIN SERPL BCG-MCNC: 4.2 G/DL (ref 3.5–5.2)
ALBUMIN UR-MCNC: 70 MG/DL
ALP SERPL-CCNC: 57 U/L (ref 40–150)
ALT SERPL W P-5'-P-CCNC: 37 U/L (ref 0–50)
ANION GAP SERPL CALCULATED.3IONS-SCNC: 11 MMOL/L (ref 7–15)
APPEARANCE UR: ABNORMAL
AST SERPL W P-5'-P-CCNC: 36 U/L (ref 0–45)
BASOPHILS # BLD AUTO: 0.04 10E3/UL (ref 0–0.2)
BASOPHILS NFR BLD AUTO: 0.5 %
BILIRUB SERPL-MCNC: 0.5 MG/DL
BILIRUB UR QL STRIP: NEGATIVE
BLD GP AB SCN SERPL QL: POSITIVE
BUN SERPL-MCNC: 19 MG/DL (ref 8–23)
CALCIUM SERPL-MCNC: 9.6 MG/DL (ref 8.8–10.4)
CHLORIDE SERPL-SCNC: 105 MMOL/L (ref 98–107)
COLOR UR AUTO: ABNORMAL
CREAT SERPL-MCNC: 0.67 MG/DL (ref 0.51–0.95)
EGFRCR SERPLBLD CKD-EPI 2021: 86 ML/MIN/1.73M2
EOSINOPHIL # BLD AUTO: <0.03 10E3/UL (ref 0–0.7)
EOSINOPHIL NFR BLD AUTO: 0 %
ERYTHROCYTE [DISTWIDTH] IN BLOOD BY AUTOMATED COUNT: 14.6 % (ref 10–15)
GLUCOSE SERPL-MCNC: 89 MG/DL (ref 70–99)
GLUCOSE UR STRIP-MCNC: NEGATIVE MG/DL
HCO3 SERPL-SCNC: 25 MMOL/L (ref 22–29)
HCT VFR BLD AUTO: 43.5 % (ref 35–47)
HGB BLD-MCNC: 13.9 G/DL (ref 11.7–15.7)
HGB UR QL STRIP: ABNORMAL
HOLD SPECIMEN: NORMAL
HOLD SPECIMEN: NORMAL
IMM GRANULOCYTES # BLD: 0.03 10E3/UL
IMM GRANULOCYTES NFR BLD: 0.4 %
KETONES UR STRIP-MCNC: ABNORMAL MG/DL
LEUKOCYTE ESTERASE UR QL STRIP: ABNORMAL
LYMPHOCYTES # BLD AUTO: 0.89 10E3/UL (ref 0.8–5.3)
LYMPHOCYTES NFR BLD AUTO: 12 %
MCH RBC QN AUTO: 28.1 PG (ref 26.5–33)
MCHC RBC AUTO-ENTMCNC: 32 G/DL (ref 31.5–36.5)
MCV RBC AUTO: 87.9 FL (ref 78–100)
MONOCYTES # BLD AUTO: 0.25 10E3/UL (ref 0–1.3)
MONOCYTES NFR BLD AUTO: 3.4 %
MUCOUS THREADS #/AREA URNS LPF: PRESENT /LPF
NEUTROPHILS # BLD AUTO: 6.22 10E3/UL (ref 1.6–8.3)
NEUTROPHILS NFR BLD AUTO: 83.7 %
NITRATE UR QL: NEGATIVE
NRBC # BLD AUTO: <0.03 10E3/UL
NRBC BLD AUTO-RTO: 0 /100
PH UR STRIP: 6 [PH] (ref 5–7)
PLATELET # BLD AUTO: 161 10E3/UL (ref 150–450)
POTASSIUM SERPL-SCNC: 4.7 MMOL/L (ref 3.4–5.3)
PROT SERPL-MCNC: 6.4 G/DL (ref 6.4–8.3)
RBC # BLD AUTO: 4.95 10E6/UL (ref 3.8–5.2)
RBC URINE: >182 /HPF
SODIUM SERPL-SCNC: 141 MMOL/L (ref 135–145)
SP GR UR STRIP: 1.03 (ref 1–1.03)
SPECIMEN EXP DATE BLD: ABNORMAL
SPECIMEN EXP DATE BLD: NORMAL
SQUAMOUS EPITHELIAL: 4 /HPF
UNIDENTIFIED AB [PRESENCE] IN SERUM OR PLASMA: NORMAL
UROBILINOGEN UR STRIP-MCNC: NORMAL MG/DL
WBC # BLD AUTO: 7.43 10E3/UL (ref 4–11)
WBC URINE: 48 /HPF

## 2025-08-19 PROCEDURE — 85004 AUTOMATED DIFF WBC COUNT: CPT | Performed by: EMERGENCY MEDICINE

## 2025-08-19 PROCEDURE — 250N000011 HC RX IP 250 OP 636: Performed by: EMERGENCY MEDICINE

## 2025-08-19 PROCEDURE — 81001 URINALYSIS AUTO W/SCOPE: CPT | Performed by: EMERGENCY MEDICINE

## 2025-08-19 PROCEDURE — 250N000009 HC RX 250: Performed by: EMERGENCY MEDICINE

## 2025-08-19 PROCEDURE — 36415 COLL VENOUS BLD VENIPUNCTURE: CPT | Performed by: EMERGENCY MEDICINE

## 2025-08-19 PROCEDURE — 86870 RBC ANTIBODY IDENTIFICATION: CPT | Performed by: EMERGENCY MEDICINE

## 2025-08-19 PROCEDURE — 87086 URINE CULTURE/COLONY COUNT: CPT | Performed by: EMERGENCY MEDICINE

## 2025-08-19 PROCEDURE — 86900 BLOOD TYPING SEROLOGIC ABO: CPT | Performed by: EMERGENCY MEDICINE

## 2025-08-19 PROCEDURE — 96365 THER/PROPH/DIAG IV INF INIT: CPT | Mod: 59 | Performed by: EMERGENCY MEDICINE

## 2025-08-19 PROCEDURE — 96375 TX/PRO/DX INJ NEW DRUG ADDON: CPT | Mod: 59 | Performed by: EMERGENCY MEDICINE

## 2025-08-19 PROCEDURE — 86905 BLOOD TYPING RBC ANTIGENS: CPT | Performed by: EMERGENCY MEDICINE

## 2025-08-19 PROCEDURE — 258N000003 HC RX IP 258 OP 636: Performed by: EMERGENCY MEDICINE

## 2025-08-19 PROCEDURE — 84155 ASSAY OF PROTEIN SERUM: CPT | Performed by: EMERGENCY MEDICINE

## 2025-08-19 PROCEDURE — 99285 EMERGENCY DEPT VISIT HI MDM: CPT | Mod: 25 | Performed by: EMERGENCY MEDICINE

## 2025-08-19 PROCEDURE — 250N000013 HC RX MED GY IP 250 OP 250 PS 637: Performed by: EMERGENCY MEDICINE

## 2025-08-19 PROCEDURE — 74177 CT ABD & PELVIS W/CONTRAST: CPT

## 2025-08-19 PROCEDURE — 96361 HYDRATE IV INFUSION ADD-ON: CPT | Performed by: EMERGENCY MEDICINE

## 2025-08-19 RX ORDER — ONDANSETRON 2 MG/ML
4 INJECTION INTRAMUSCULAR; INTRAVENOUS ONCE
Status: COMPLETED | OUTPATIENT
Start: 2025-08-19 | End: 2025-08-19

## 2025-08-19 RX ORDER — IOPAMIDOL 755 MG/ML
500 INJECTION, SOLUTION INTRAVASCULAR ONCE
Status: COMPLETED | OUTPATIENT
Start: 2025-08-19 | End: 2025-08-19

## 2025-08-19 RX ORDER — CEFTRIAXONE 1 G/1
1 INJECTION, POWDER, FOR SOLUTION INTRAMUSCULAR; INTRAVENOUS ONCE
Status: COMPLETED | OUTPATIENT
Start: 2025-08-19 | End: 2025-08-19

## 2025-08-19 RX ORDER — HYDROCODONE BITARTRATE AND ACETAMINOPHEN 5; 325 MG/1; MG/1
1 TABLET ORAL ONCE
Refills: 0 | Status: COMPLETED | OUTPATIENT
Start: 2025-08-19 | End: 2025-08-19

## 2025-08-19 RX ORDER — GABAPENTIN 100 MG/1
100 CAPSULE ORAL 3 TIMES DAILY
COMMUNITY

## 2025-08-19 RX ORDER — ACETAMINOPHEN 325 MG/1
650 TABLET ORAL ONCE
Status: COMPLETED | OUTPATIENT
Start: 2025-08-19 | End: 2025-08-19

## 2025-08-19 RX ORDER — CEFDINIR 300 MG/1
300 CAPSULE ORAL 2 TIMES DAILY
Qty: 14 CAPSULE | Refills: 0 | Status: SHIPPED | OUTPATIENT
Start: 2025-08-19 | End: 2025-08-26

## 2025-08-19 RX ADMIN — ONDANSETRON 4 MG: 2 INJECTION INTRAMUSCULAR; INTRAVENOUS at 13:20

## 2025-08-19 RX ADMIN — ACETAMINOPHEN 650 MG: 325 TABLET ORAL at 13:54

## 2025-08-19 RX ADMIN — IOPAMIDOL 74 ML: 755 INJECTION, SOLUTION INTRAVENOUS at 13:34

## 2025-08-19 RX ADMIN — SODIUM CHLORIDE 500 ML: 9 INJECTION, SOLUTION INTRAVENOUS at 13:20

## 2025-08-19 RX ADMIN — SODIUM CHLORIDE 59 ML: 9 INJECTION, SOLUTION INTRAVENOUS at 13:34

## 2025-08-19 RX ADMIN — HYDROCODONE BITARTRATE AND ACETAMINOPHEN 1 TABLET: 5; 325 TABLET ORAL at 13:22

## 2025-08-19 RX ADMIN — CEFTRIAXONE 1 G: 1 INJECTION, POWDER, FOR SOLUTION INTRAMUSCULAR; INTRAVENOUS at 15:07

## 2025-08-19 ASSESSMENT — ACTIVITIES OF DAILY LIVING (ADL)
ADLS_ACUITY_SCORE: 41

## 2025-08-19 ASSESSMENT — COLUMBIA-SUICIDE SEVERITY RATING SCALE - C-SSRS
6. HAVE YOU EVER DONE ANYTHING, STARTED TO DO ANYTHING, OR PREPARED TO DO ANYTHING TO END YOUR LIFE?: NO
1. IN THE PAST MONTH, HAVE YOU WISHED YOU WERE DEAD OR WISHED YOU COULD GO TO SLEEP AND NOT WAKE UP?: NO
2. HAVE YOU ACTUALLY HAD ANY THOUGHTS OF KILLING YOURSELF IN THE PAST MONTH?: NO

## 2025-08-20 LAB
BACTERIA UR CULT: NORMAL
LITTLE C AG RBC QL: NEGATIVE
SPECIMEN EXP DATE BLD: NORMAL

## 2025-08-21 ENCOUNTER — OFFICE VISIT (OUTPATIENT)
Dept: GASTROENTEROLOGY | Facility: CLINIC | Age: 85
End: 2025-08-21
Payer: MEDICARE

## 2025-08-21 VITALS
OXYGEN SATURATION: 97 % | WEIGHT: 148.5 LBS | HEART RATE: 60 BPM | BODY MASS INDEX: 25.35 KG/M2 | HEIGHT: 64 IN | SYSTOLIC BLOOD PRESSURE: 146 MMHG | DIASTOLIC BLOOD PRESSURE: 79 MMHG

## 2025-08-21 DIAGNOSIS — K52.832 LYMPHOCYTIC COLITIS: ICD-10-CM

## 2025-08-21 RX ORDER — MESALAMINE 1.2 G/1
2400 TABLET, DELAYED RELEASE ORAL
Qty: 180 TABLET | Refills: 1 | Status: SHIPPED | OUTPATIENT
Start: 2025-08-21

## 2025-08-21 RX ORDER — ACETAMINOPHEN 500 MG
500 TABLET ORAL
COMMUNITY

## 2025-08-21 RX ORDER — BUDESONIDE 3 MG/1
6 CAPSULE, COATED PELLETS ORAL EVERY MORNING
Qty: 180 CAPSULE | Refills: 1 | Status: SHIPPED | OUTPATIENT
Start: 2025-08-21

## 2025-08-21 ASSESSMENT — PAIN SCALES - GENERAL: PAINLEVEL_OUTOF10: NO PAIN (0)

## 2025-09-01 ENCOUNTER — E-VISIT (OUTPATIENT)
Dept: INTERNAL MEDICINE | Facility: CLINIC | Age: 85
End: 2025-09-01
Payer: MEDICARE

## 2025-09-01 DIAGNOSIS — R39.9 URINARY SYMPTOM OR SIGN: Primary | ICD-10-CM

## 2025-09-03 ENCOUNTER — LAB (OUTPATIENT)
Dept: LAB | Facility: CLINIC | Age: 85
End: 2025-09-03
Payer: MEDICARE

## 2025-09-03 ENCOUNTER — RESULTS FOLLOW-UP (OUTPATIENT)
Dept: INTERNAL MEDICINE | Facility: CLINIC | Age: 85
End: 2025-09-03

## 2025-09-03 DIAGNOSIS — R39.9 URINARY SYMPTOM OR SIGN: Primary | ICD-10-CM

## 2025-09-03 DIAGNOSIS — R39.9 URINARY SYMPTOM OR SIGN: ICD-10-CM

## 2025-09-03 LAB
ALBUMIN UR-MCNC: >=300 MG/DL
APPEARANCE UR: ABNORMAL
BACTERIA #/AREA URNS HPF: ABNORMAL /HPF
BILIRUB UR QL STRIP: NEGATIVE
COLOR UR AUTO: ABNORMAL
GLUCOSE UR STRIP-MCNC: NEGATIVE MG/DL
HGB UR QL STRIP: ABNORMAL
KETONES UR STRIP-MCNC: ABNORMAL MG/DL
LEUKOCYTE ESTERASE UR QL STRIP: NEGATIVE
NITRATE UR QL: NEGATIVE
PH UR STRIP: 5.5 [PH] (ref 5–7)
RBC #/AREA URNS AUTO: ABNORMAL /HPF
SP GR UR STRIP: 1.02 (ref 1–1.03)
UROBILINOGEN UR STRIP-ACNC: 0.2 E.U./DL
WBC #/AREA URNS AUTO: ABNORMAL /HPF

## 2025-09-03 PROCEDURE — 81001 URINALYSIS AUTO W/SCOPE: CPT

## 2025-09-04 RX ORDER — SULFAMETHOXAZOLE AND TRIMETHOPRIM 800; 160 MG/1; MG/1
1 TABLET ORAL 2 TIMES DAILY
Qty: 14 TABLET | Refills: 0 | Status: SHIPPED | OUTPATIENT
Start: 2025-09-04

## (undated) DEVICE — ENDO SNARE POLYPECTOMY OVAL 15MM LOOP SD-240U-15

## (undated) DEVICE — ENDO TRAP POLYP QUICK CATCH 710201

## (undated) DEVICE — KIT ENDO TURNOVER/PROCEDURE W/CLEAN A SCOPE LINERS 103888

## (undated) RX ORDER — FENTANYL CITRATE 50 UG/ML
INJECTION, SOLUTION INTRAMUSCULAR; INTRAVENOUS
Status: DISPENSED
Start: 2018-08-13